# Patient Record
Sex: MALE | Race: WHITE | NOT HISPANIC OR LATINO | Employment: UNEMPLOYED | URBAN - METROPOLITAN AREA
[De-identification: names, ages, dates, MRNs, and addresses within clinical notes are randomized per-mention and may not be internally consistent; named-entity substitution may affect disease eponyms.]

---

## 2017-04-07 ENCOUNTER — HOSPITAL ENCOUNTER (INPATIENT)
Facility: CLINIC | Age: 42
LOS: 7 days | Discharge: HOME | DRG: 897 | End: 2017-04-14
Attending: FAMILY MEDICINE | Admitting: PSYCHIATRY & NEUROLOGY
Payer: COMMERCIAL

## 2017-04-07 DIAGNOSIS — F10.20 UNCOMPLICATED ALCOHOL DEPENDENCE (H): ICD-10-CM

## 2017-04-07 DIAGNOSIS — F19.10 POLYSUBSTANCE ABUSE (H): ICD-10-CM

## 2017-04-07 PROBLEM — F19.20 CHEMICAL DEPENDENCY (H): Status: ACTIVE | Noted: 2017-04-07

## 2017-04-07 LAB
ALCOHOL BREATH TEST: 0 (ref 0–0.01)
AMPHETAMINES UR QL SCN: ABNORMAL
BARBITURATES UR QL: ABNORMAL
BENZODIAZ UR QL: ABNORMAL
CANNABINOIDS UR QL SCN: ABNORMAL
COCAINE UR QL: ABNORMAL
ETHANOL UR QL SCN: ABNORMAL
OPIATES UR QL SCN: ABNORMAL

## 2017-04-07 PROCEDURE — 25000132 ZZH RX MED GY IP 250 OP 250 PS 637: Performed by: PSYCHIATRY & NEUROLOGY

## 2017-04-07 PROCEDURE — 99285 EMERGENCY DEPT VISIT HI MDM: CPT | Mod: 25 | Performed by: FAMILY MEDICINE

## 2017-04-07 PROCEDURE — 82075 ASSAY OF BREATH ETHANOL: CPT | Performed by: FAMILY MEDICINE

## 2017-04-07 PROCEDURE — 99285 EMERGENCY DEPT VISIT HI MDM: CPT | Mod: Z6 | Performed by: FAMILY MEDICINE

## 2017-04-07 PROCEDURE — HZ2ZZZZ DETOXIFICATION SERVICES FOR SUBSTANCE ABUSE TREATMENT: ICD-10-PCS | Performed by: PSYCHIATRY & NEUROLOGY

## 2017-04-07 PROCEDURE — 80307 DRUG TEST PRSMV CHEM ANLYZR: CPT | Performed by: FAMILY MEDICINE

## 2017-04-07 PROCEDURE — 12800008 ZZH R&B CD ADULT

## 2017-04-07 PROCEDURE — 25000132 ZZH RX MED GY IP 250 OP 250 PS 637: Performed by: FAMILY MEDICINE

## 2017-04-07 PROCEDURE — 80320 DRUG SCREEN QUANTALCOHOLS: CPT | Performed by: FAMILY MEDICINE

## 2017-04-07 PROCEDURE — 25000125 ZZHC RX 250: Performed by: FAMILY MEDICINE

## 2017-04-07 RX ORDER — MULTIPLE VITAMINS W/ MINERALS TAB 9MG-400MCG
1 TAB ORAL DAILY
Status: DISCONTINUED | OUTPATIENT
Start: 2017-04-07 | End: 2017-04-14 | Stop reason: HOSPADM

## 2017-04-07 RX ORDER — NICOTINE 21 MG/24HR
1 PATCH, TRANSDERMAL 24 HOURS TRANSDERMAL ONCE
Status: COMPLETED | OUTPATIENT
Start: 2017-04-07 | End: 2017-04-07

## 2017-04-07 RX ORDER — HYDROXYZINE HYDROCHLORIDE 25 MG/1
25-50 TABLET, FILM COATED ORAL EVERY 4 HOURS PRN
Status: DISCONTINUED | OUTPATIENT
Start: 2017-04-07 | End: 2017-04-14 | Stop reason: HOSPADM

## 2017-04-07 RX ORDER — DIAZEPAM 5 MG
5-20 TABLET ORAL EVERY 30 MIN PRN
Status: DISCONTINUED | OUTPATIENT
Start: 2017-04-07 | End: 2017-04-13

## 2017-04-07 RX ORDER — ACETAMINOPHEN 325 MG/1
650 TABLET ORAL EVERY 4 HOURS PRN
Status: DISCONTINUED | OUTPATIENT
Start: 2017-04-07 | End: 2017-04-14 | Stop reason: HOSPADM

## 2017-04-07 RX ORDER — DIVALPROEX SODIUM 500 MG/1
1500 TABLET, EXTENDED RELEASE ORAL DAILY
Status: ON HOLD | COMMUNITY
End: 2017-04-14

## 2017-04-07 RX ORDER — ATENOLOL 50 MG/1
50 TABLET ORAL DAILY PRN
Status: DISCONTINUED | OUTPATIENT
Start: 2017-04-07 | End: 2017-04-14 | Stop reason: HOSPADM

## 2017-04-07 RX ORDER — ALUMINA, MAGNESIA, AND SIMETHICONE 2400; 2400; 240 MG/30ML; MG/30ML; MG/30ML
30 SUSPENSION ORAL EVERY 4 HOURS PRN
Status: DISCONTINUED | OUTPATIENT
Start: 2017-04-07 | End: 2017-04-14 | Stop reason: HOSPADM

## 2017-04-07 RX ORDER — TRAZODONE HYDROCHLORIDE 50 MG/1
50 TABLET, FILM COATED ORAL
Status: DISCONTINUED | OUTPATIENT
Start: 2017-04-07 | End: 2017-04-10

## 2017-04-07 RX ORDER — BISACODYL 10 MG
10 SUPPOSITORY, RECTAL RECTAL DAILY PRN
Status: DISCONTINUED | OUTPATIENT
Start: 2017-04-07 | End: 2017-04-14 | Stop reason: HOSPADM

## 2017-04-07 RX ORDER — FOLIC ACID 1 MG/1
1 TABLET ORAL DAILY
Status: DISCONTINUED | OUTPATIENT
Start: 2017-04-07 | End: 2017-04-14 | Stop reason: HOSPADM

## 2017-04-07 RX ORDER — DIVALPROEX SODIUM 500 MG/1
500 TABLET, DELAYED RELEASE ORAL 2 TIMES DAILY
Status: DISCONTINUED | OUTPATIENT
Start: 2017-04-07 | End: 2017-04-11

## 2017-04-07 RX ORDER — MIRTAZAPINE 15 MG/1
15 TABLET, FILM COATED ORAL AT BEDTIME
Status: DISCONTINUED | OUTPATIENT
Start: 2017-04-07 | End: 2017-04-14 | Stop reason: HOSPADM

## 2017-04-07 RX ORDER — LANOLIN ALCOHOL/MO/W.PET/CERES
100 CREAM (GRAM) TOPICAL DAILY
Status: COMPLETED | OUTPATIENT
Start: 2017-04-07 | End: 2017-04-09

## 2017-04-07 RX ORDER — OLANZAPINE 5 MG/1
10 TABLET, ORALLY DISINTEGRATING ORAL ONCE
Status: COMPLETED | OUTPATIENT
Start: 2017-04-07 | End: 2017-04-07

## 2017-04-07 RX ORDER — DIAZEPAM 5 MG
5 TABLET ORAL ONCE
Status: COMPLETED | OUTPATIENT
Start: 2017-04-07 | End: 2017-04-07

## 2017-04-07 RX ORDER — ONDANSETRON 8 MG/1
8 TABLET, ORALLY DISINTEGRATING ORAL ONCE
Status: COMPLETED | OUTPATIENT
Start: 2017-04-07 | End: 2017-04-07

## 2017-04-07 RX ORDER — QUETIAPINE FUMARATE 25 MG/1
25 TABLET, FILM COATED ORAL 4 TIMES DAILY PRN
Status: DISCONTINUED | OUTPATIENT
Start: 2017-04-07 | End: 2017-04-10

## 2017-04-07 RX ORDER — GABAPENTIN 300 MG/1
300 CAPSULE ORAL 3 TIMES DAILY
Status: DISCONTINUED | OUTPATIENT
Start: 2017-04-07 | End: 2017-04-14 | Stop reason: HOSPADM

## 2017-04-07 RX ADMIN — MIRTAZAPINE 15 MG: 15 TABLET, FILM COATED ORAL at 21:49

## 2017-04-07 RX ADMIN — DIAZEPAM 10 MG: 5 TABLET ORAL at 17:29

## 2017-04-07 RX ADMIN — GABAPENTIN 300 MG: 300 CAPSULE ORAL at 21:10

## 2017-04-07 RX ADMIN — DIAZEPAM 10 MG: 5 TABLET ORAL at 21:10

## 2017-04-07 RX ADMIN — OLANZAPINE 10 MG: 5 TABLET, ORALLY DISINTEGRATING ORAL at 13:16

## 2017-04-07 RX ADMIN — NICOTINE POLACRILEX 8 MG: 4 LOZENGE ORAL at 21:11

## 2017-04-07 RX ADMIN — MULTIPLE VITAMINS W/ MINERALS TAB 1 TABLET: TAB at 17:29

## 2017-04-07 RX ADMIN — FOLIC ACID 1 MG: 1 TABLET ORAL at 17:29

## 2017-04-07 RX ADMIN — ONDANSETRON 8 MG: 8 TABLET, ORALLY DISINTEGRATING ORAL at 12:34

## 2017-04-07 RX ADMIN — NICOTINE POLACRILEX 8 MG: 4 LOZENGE ORAL at 17:29

## 2017-04-07 RX ADMIN — Medication 100 MG: at 17:29

## 2017-04-07 RX ADMIN — DIAZEPAM 5 MG: 5 TABLET ORAL at 12:27

## 2017-04-07 RX ADMIN — DIVALPROEX SODIUM 500 MG: 500 TABLET, DELAYED RELEASE ORAL at 21:10

## 2017-04-07 RX ADMIN — NICOTINE 1 PATCH: 21 PATCH, EXTENDED RELEASE TRANSDERMAL at 13:50

## 2017-04-07 ASSESSMENT — ACTIVITIES OF DAILY LIVING (ADL)
LAUNDRY: WITH SUPERVISION
DRESS: STREET CLOTHES;SCRUBS (BEHAVIORAL HEALTH);INDEPENDENT
GROOMING: INDEPENDENT
ORAL_HYGIENE: INDEPENDENT

## 2017-04-07 NOTE — IP AVS SNAPSHOT
MRN:[id number]                      After Visit Summary   4/7/2017    Kai Sims    MRN: [id number]           Thank you!     Thank you for choosing [facility] for your care. Our goal is always to provide you with excellent care.        Patient Information     Date Of Birth          1975        Designated Caregiver       Most Recent Value    Caregiver    Will someone help with your care after discharge? no      About your hospital stay     You were admitted on:  April 7, 2017 You last received care in the:  [facility]    You were discharged on:  April 14, 2017       Who to Call     For medical emergencies, please call 911.  For non-urgent questions about your medical care, please call your primary care provider or clinic, None          Attending Provider     Provider Specialty    Alan Cardoza MD Family Practice    [facility], Nicholas Wells MD Emergency Medicine    Damir Patel MD Psychiatry       Primary Care Provider    Physician No Ref-Primary       No address on file        Follow-up Appointments     Follow Up ([facility])       Follow up with PCP within 1 week for repeat LFTs    Appointments on [facility] and/or [facility] (with [facility] or [facility] provider or service). Call [phone] if you haven't heard regarding these appointments within 7 days of discharge.                  Your next 10 appointments already scheduled     Apr 14, 2017  1:00 PM CDT   Treatment with LP/DOP ADMISSIONS   [facility] ([facility], [facility])    [address] [place] [address] [id number] [phone]              Further instructions from your care team       Behavioral Discharge Planning and Instructions  THANK YOU FOR CHOOSING THE [facility]  3A  [phone]    Summary: You were admitted to [facility] on 4/7/2017 for Alcohol dependence   You are being discharged to [facility]  Please take  care and make your recovery a priority, Kai!        Main Diagnosis:  Alcohol use disorder - severe    Major Treatments, Procedures and Findings:  You received medical treatment for the symptoms of alcohol withdrawal. A medical exam was performed that included lab work. You have met with a .       Symptoms to Report:  If you experience more anxiety, confusion, sleeplessness, deep sadness or thoughts of suicide, notify your treatment team or notify your primary care physician. IF ANY OF THE SYMPTOMS YOU ARE EXPERIENCING ARE A MEDICAL EMERGENCY CALL 911 IMMEDIATELY.     Lifestyle Adjustment: Adjust your lifestyle to get enough sleep, relaxation, exercise and  good nutrition. Continue to develop healthy coping skills to decrease stress and promote a sober living environment. Do not use alcohol, illegal drugs or addictive medications other than what is currently prescribed. AA, NA, and  Sponsor are good resources for support.     Primary Provider:  1. Alcohol use disorder  2. Stimulant use disorder  3. Unspecified bipolar disorder    Psychiatry Follow-up:       Resources:     [facility] [phone]    Support Group:  AA/NA and Sponsor/support    Crisis Intervention: 795.977.3497 or 788-933-5675 (TTY: 462.192.9729).  Call anytime for help.  National [place] on Mental Illness (www.[place].br[place]any.org): 241.814.7236 or 670-244-2508.  Alcoholics Anonymous (www.alcoholics-anonymous.org): Check your phone book for your local chapter.  Suicide Awareness Voices of Education (SAVE) (www.save.org): 937-741-ORFQ (8483)  National Suicide Prevention Line (www.mentalhealthmn.org): 842-446-HTMV (9148)  Mental Health Consumer/Survivor Network of [place] (www.mhcsn.net): 114.681.9532 or 003-342-0414  Mental Health Association of [place] (www.mentalhealth.org): 975.175.4524 or 260-948-7672   Substance Abuse and Mental Health Services (www.samhsa.gov)    [place] Recovery Connection (MRC)  [facility] connects people seeking recovery  "to resources that help foster and sustain long-term recovery.  Whether you are seeking resources for treatment, transportation, housing, job training, education, health care or other pathways to recovery, [facility] is a great place to start.  422.583.2793.  Www.[facility].org    General Medication Instructions:   See your medication sheet(s) for instructions.   Take all medicines as directed.  Make no changes unless your doctor suggests them.   Go to all your doctor visits.  Be sure to have all your required lab tests. This way, your medicines can be refilled on time.  Do not use any drugs not prescribed by your provider.  AA/NA and Sponsors are excellent resources for support  Avoid alcohol.    Please Note:  Please take this discharge folder with you to all your follow up appointments, it contains your lab results, diagnosis, medication list and discharge recommendations.      Pending Results     No orders found from 4/5/2017 to 4/8/2017.            Statement of Approval     Ordered          04/14/17 1048  I have reviewed and agree with all the recommendations and orders detailed in this document.  EFFECTIVE NOW     Approved and electronically signed by:  Damir Patel MD             Admission Information     Date & Time Provider Department Dept. Phone    4/7/2017 Damir Patel MD [facility] [phone]      Your Vitals Were     Blood Pressure Pulse Temperature Respirations Height Weight    143/76 87 97.3  F (36.3  C) (Oral) 16 1.702 m (5' 7\") 60.8 kg (134 lb)    Pulse Oximetry BMI (Body Mass Index)                98% 20.99 kg/m2          MyChart Information     [employer] lets you send messages to your doctor, view your test results, renew your prescriptions, schedule appointments and more. To sign up, go to [email or web address] . Click on \"Log in\" on the left side of the screen, which will take you to the Welcome page. Then click on \"Sign up Now\" on the right side of the " page.     You will be asked to enter the access code listed below, as well as some personal information. Please follow the directions to create your username and password.     Your access code is: [email or web address]  Expires: 2017 12:24 PM     Your access code will  in 90 days. If you need help or a new code, please call your [facility] clinic or [phone].        Care EveryWhere ID     This is your Care EveryWhere ID. This could be used by other organizations to access your [facility] medical records  [id number]           Review of your medicines      START taking        Dose / Directions    divalproex 500 MG EC tablet   Commonly known as:  DEPAKOTE   Used for:  Uncomplicated alcohol dependence (H)   Replaces:  divalproex 500 MG 24 hr tablet        Dose:  1500 mg   Take 3 tablets (1,500 mg) by mouth At Bedtime   Quantity:  90 tablet   Refills:  0       folic acid 1 MG tablet   Commonly known as:  FOLVITE   Used for:  Uncomplicated alcohol dependence (H)        Dose:  1 mg   Take 1 tablet (1 mg) by mouth daily   Quantity:  30 tablet   Refills:  0       lurasidone 20 MG Tabs tablet   Commonly known as:  LATUDA   Used for:  Uncomplicated alcohol dependence (H)        Dose:  20 mg   Take 1 tablet (20 mg) by mouth 2 times daily   Quantity:  60 tablet   Refills:  0       multivitamin, therapeutic with minerals Tabs tablet   Used for:  Uncomplicated alcohol dependence (H)        Dose:  1 tablet   Take 1 tablet by mouth daily   Quantity:  30 each   Refills:  0       nicotine polacrilex 4 MG lozenge   Used for:  Uncomplicated alcohol dependence (H)        Dose:  4-8 mg   Place 1-2 lozenges (4-8 mg) inside cheek every hour as needed for other (nicotine withdrawal symptoms)   Quantity:  360 tablet   Refills:  0       thiamine 100 MG tablet   Used for:  Uncomplicated alcohol dependence (H)        Dose:  100 mg   Take 1 tablet (100 mg) by mouth daily   Quantity:  30 tablet   Refills:  0         CONTINUE these medicines  which may have CHANGED, or have new prescriptions. If we are uncertain of the size of tablets/capsules you have at home, strength may be listed as something that might have changed.        Dose / Directions    gabapentin 300 MG capsule   Commonly known as:  NEURONTIN   This may have changed:  medication strength   Used for:  Uncomplicated alcohol dependence (H)        Dose:  300 mg   Take 1 capsule (300 mg) by mouth 3 times daily   Quantity:  90 capsule   Refills:  0       QUEtiapine 25 MG tablet   Commonly known as:  SEROquel   This may have changed:    - how much to take  - when to take this  - reasons to take this   Used for:  Uncomplicated alcohol dependence (H)        Dose:  25-50 mg   Take 1-2 tablets (25-50 mg) by mouth 4 times daily as needed (Anxiety, Agitation)   Quantity:  60 tablet   Refills:  1         CONTINUE these medicines which have NOT CHANGED        Dose / Directions    mirtazapine 15 MG tablet   Commonly known as:  REMERON   Used for:  Uncomplicated alcohol dependence (H)        Dose:  15 mg   Take 1 tablet (15 mg) by mouth At Bedtime   Quantity:  30 tablet   Refills:  0         STOP taking     divalproex 500 MG 24 hr tablet   Commonly known as:  DEPAKOTE ER   Replaced by:  divalproex 500 MG EC tablet                Where to get your medicines      These medications were sent to [facility] [place], [place] - [address]  [address] [address]     Phone:  [phone]     divalproex 500 MG EC tablet    folic acid 1 MG tablet    gabapentin 300 MG capsule    lurasidone 20 MG Tabs tablet    mirtazapine 15 MG tablet    multivitamin, therapeutic with minerals Tabs tablet    nicotine polacrilex 4 MG lozenge    QUEtiapine 25 MG tablet    thiamine 100 MG tablet                Protect others around you: Learn how to safely use, store and throw away your medicines at www.disposemymeds.org.             Medication List: This is a list of all your medications and  when to take them. Check marks below indicate your daily home schedule. Keep this list as a reference.      Medications           Morning Afternoon Evening Bedtime As Needed    divalproex 500 MG EC tablet   Commonly known as:  DEPAKOTE   Take 3 tablets (1,500 mg) by mouth At Bedtime   Last time this was given:  500 mg on 4/13/2017  6:05 AM                                   folic acid 1 MG tablet   Commonly known as:  FOLVITE   Take 1 tablet (1 mg) by mouth daily   Last time this was given:  1 mg on 4/14/2017  8:00 AM                                   gabapentin 300 MG capsule   Commonly known as:  NEURONTIN   Take 1 capsule (300 mg) by mouth 3 times daily   Last time this was given:  300 mg on 4/14/2017  8:00 AM                                         lurasidone 20 MG Tabs tablet   Commonly known as:  LATUDA   Take 1 tablet (20 mg) by mouth 2 times daily   Last time this was given:  20 mg on 4/14/2017  8:00 AM                                      mirtazapine 15 MG tablet   Commonly known as:  REMERON   Take 1 tablet (15 mg) by mouth At Bedtime   Last time this was given:  15 mg on 4/13/2017  9:44 PM                                   multivitamin, therapeutic with minerals Tabs tablet   Take 1 tablet by mouth daily   Last time this was given:  1 tablet on 4/14/2017  8:00 AM                                   nicotine polacrilex 4 MG lozenge   Place 1-2 lozenges (4-8 mg) inside cheek every hour as needed for other (nicotine withdrawal symptoms)   Last time this was given:  8 mg on 4/14/2017 12:22 PM                                   QUEtiapine 25 MG tablet   Commonly known as:  SEROquel   Take 1-2 tablets (25-50 mg) by mouth 4 times daily as needed (Anxiety, Agitation)   Last time this was given:  50 mg on 4/14/2017 10:14 AM                                   thiamine 100 MG tablet   Take 1 tablet (100 mg) by mouth daily   Last time this was given:  100 mg on 4/14/2017  8:00 AM

## 2017-04-07 NOTE — PHARMACY-ADMISSION MEDICATION HISTORY
Admission medication history interview status for the 4/7/2017 admission is complete. See Epic admission navigator for allergy information, pharmacy, prior to admission medications and immunization status.     Medication history interview sources:  patient, [facility] [address]    Changes made to PTA medication list (reason)  Added: Remeron, gabapentin  Deleted: docusate, hydrocortisone rectal cream, zyprexa (per patient has not had in a while, rx on file from 2012)  Changed: Depakote to ER formulation    Additional medication history information (including reliability of information, actions taken by pharmacist):  -Patient does not seem to be the best historian, he remembers drug names if you say them to him. He says he has not been taking any medications much the past 2 years. He last took his medications in February 2017 approximately. He says he took all four of the medications listed below, but he has not gotten the Depakote and Seroquel filled in the past 6 months, so unsure about the reliability of this.   -[facility] had the gabapentin and mirtazapine as his most recently filled medication (last filled 2/7/17). No recent record of filling the Depakote or Seroquel.    Prior to Admission medications    Medication Sig Last Dose Taking? Auth Provider   divalproex (DEPAKOTE ER) 500 MG 24 hr tablet Take 1,500 mg by mouth daily More than a month at Unknown time  Unknown, Entered By History   Mirtazapine (REMERON PO) Take 15 mg by mouth At Bedtime More than a month at Unknown time  Unknown, Entered By History   GABAPENTIN PO Take 300 mg by mouth 3 times daily More than a month at Unknown time  Unknown, Entered By History   QUEtiapine Fumarate (SEROQUEL PO) Take 25 mg by mouth as needed. More than a month at Unknown time  Reported, Patient     Medication history completed by:   Nilsa Mojica, Pharm.D.

## 2017-04-07 NOTE — PLAN OF CARE
Problem: General Plan of Care (Inpatient Behavioral)  Goal: Individualization/Patient Specific Goal (IP Behavioral)  The patient and/or their representative will achieve their patient-specific goals related to the plan of care.    The patient-specific goals include:   Patient will identify reason(s) for hospitalization from their perspective.  Patient will identify a goal for discharge.  Patient will identify triggers that may increase their risk for relapse.  Patient will identify coping skills they can do to stay well.   Patient will identify their support system to demonstrate readiness for discharge.  Illnesses Management & Recovery  Patient identified  as trigger for relapse.  Patient identified  as a general wellness strategy.  Patient identified  as a warning sign that they are in danger of relapse.  Patient identified  as someone they count on to get feedback from.  Patient identified  as a way to take action when in danger of relapse.  Patient identified  as a way to cope with stress or other symptoms.

## 2017-04-07 NOTE — PLAN OF CARE
Problem: Substance Withdrawal  Goal: Substance Withdrawal  Problem: General Plan of Care (Inpatient Behavioral)  Goal: Individualization/ Patient Specific Goal (IP Behavioral)  The patient and/or their representative their patient-specific goals related to the plan of care.  Patient specific goals include:  1. Patient will be evaluated by a physician and labs will be evaluated.  2. Patient will be seen by a  for evaluation and discharge planning.  3. Patient will complete assessment paperwork  4. Patient will consume 75 % of meal to meet estimated energy needs.  5. Detoxification from alcohol using valium.  6. Patient will be provided with alcohol relapse prevention information.Signs and symptoms of listed problems will be absent or manageable.   Outcome: Declining  S::  Kai is a 40 yo M who comes to  seeking detox from alcohol.  He reports that he has been drinking one liter of vodka daily for the last two years.  He states that up until 20 days ago he was crystal meth daily for 22 yrs.  He was a daily pot smoker as well up until 15 days ago (since adolescence), but his ultimate goal is to get in to [facility] long term treatment, so he has stopped using drugs to have a clean Utox.  He reports that his last drink was last night at 22:30.  He is also a one ppd cigarette smoker, since adolescence also.  He states that he has been depressed in the past and that he has been diagnosed with bipolar disorder.  He has not been on any of his medications for more than a month.  He says that he is actually optimistic right now.  He denies any thoughts of self harm or thoughts of harming others.  He did say that he has contemplated suicide in the past. He has lost contact with all of his family members, even his 21 yo son, Juan Luis.    He has a best friend whom he sees a couple of times a week.  Her name is Marcell.  She is his emergency contact and her phone number is [phone].    B:  Epic lists a  "history of bipolar disorder and substance abuse.  He states that hd has been to detox ~ 35 times and states he has been to treatment \"40 something\" times.  He denies any other medical history.  He has lost some digits due to frostbite..  A:  Patient in moderate alcohol withdrawal AEB MSSA scores of 14 &   R:  Obtained admission orders.  Provided with a nutritious meal and encouraged increased fluid intake.  Oriented to unit routines and schedule, including lab work. Counseled on smoking cessation.  Introduced to IM&R Treatment program.  Administered a MVI, thiamine 100 mg, folic acid 1 mg, diazepam 10 mg.  Continue to monitor and medicate as indicated and ordered.      S:  4/8/17  Pt confessed that he last used crystal meth last week about three days ago.  "

## 2017-04-07 NOTE — IP AVS SNAPSHOT
[facility]    [address] [place] [id number]    Phone:  [phone]                                       After Visit Summary   4/7/2017    Kai Sims    MRN: [id number]           After Visit Summary Signature Page     I have received my discharge instructions, and my questions have been answered. I have discussed any challenges I see with this plan with the nurse or doctor.    ..........................................................................................................................................  Patient/Patient Representative Signature      ..........................................................................................................................................  Patient Representative Print Name and Relationship to Patient    ..................................................               ................................................  Date                                            Time    ..........................................................................................................................................  Reviewed by Signature/Title    ...................................................              ..............................................  Date                                                            Time

## 2017-04-07 NOTE — PROGRESS NOTES
04/07/17 1542   Patient Belongings   Did you bring any home meds/supplements to the hospital?  No   Patient Belongings other (see comments)   Disposition of Belongings storage bin   Belongings Search Yes   Clothing Search Yes   Second Staff gt velasquez   storage bin: hat, cigarettes  NO CELL PHONE, NO WALLET  ADMISSION:  I am responsible for any personal items that are not sent to the safe or pharmacy. [facility] is not responsible for loss, theft or damage of any property in my possession.    Patient Signature _____________________ Date/Time _____________________    Staff Signature _______________________ Date/Time _____________________    [facility] Staff person, if patient is unable/unwilling to sign  ___________________________________ Date/Time _____________________  DISCHARGE:  All personal items have been returned to me.    Patient Signature _____________________ Date/Time _____________________    Staff Signature _______________________ Date/Time _____________________

## 2017-04-07 NOTE — ED PROVIDER NOTES
History     Chief Complaint   Patient presents with     Alcohol Problem     seeking detox from ETOH. Last drink was last night. drinks 1L vodka daily     HPI  Kai Sims is a 41 year old male who presents seeking detox from alcohol.  Patient has a history of alcohol dependence, bipolar disorder, polysubstance abuse.  Patient has been drinking vodka up to 1 L per day.  His last drink was yesterday.  He states in the past he has experienced significant alcohol withdrawal including alcohol withdrawal seizure 2 or 3 years ago.  Today he feels anxious, restless and has a slight tremor.  He has a plan to enter [facility] chemical dependency treatment, but feels he would like to go through detox 1st.  In the past has used marijuana and methamphetamine but estimates at least 2 weeks since his last use.  He has cravings to use alcohol, and fears he cannot stay sober until entering the treatment program, would like to go to detox.  He does have a history of bipolar disorder, is off of his medications.  He denies any acute mental health symptoms, and denies any acute medical problems.    I have reviewed the Medications, Allergies, Past Medical and Surgical History, and Social History in the Epic system.    Review of Systems  All other systems reviewed and were negative    Physical Exam   BP: (!) 150/91  Pulse: 112  Temp: 98.7  F (37.1  C)  Resp: 18  Weight: 61.1 kg (134 lb 11.2 oz)  SpO2: 97 %  Physical Exam   Constitutional: He is oriented to person, place, and time. He appears well-developed and well-nourished.   HENT:   Head: Normocephalic and atraumatic.   Mouth/Throat: Oropharynx is clear and moist.   Eyes: EOM are normal. Pupils are equal, round, and reactive to light.   Neck: Normal range of motion. Neck supple. No tracheal deviation present. No thyromegaly present.   Cardiovascular: Normal rate, regular rhythm, normal heart sounds and intact distal pulses.  Exam reveals no gallop and no friction rub.    No  murmur heard.  Pulmonary/Chest: Effort normal and breath sounds normal. He exhibits no tenderness.   Abdominal: Soft. Bowel sounds are normal. He exhibits no distension and no mass. There is no tenderness.   Musculoskeletal: He exhibits no edema or tenderness.   Neurological: He is alert and oriented to person, place, and time. He has normal strength. He displays tremor. No cranial nerve deficit or sensory deficit. Coordination and gait normal.   Skin: Skin is warm and dry. No rash noted.   Psychiatric: His speech is normal and behavior is normal. Judgment and thought content normal. His mood appears anxious. Cognition and memory are normal.   Nursing note and vitals reviewed.      ED Course     ED Course     Procedures             Critical Care time:  none               Labs Ordered and Resulted from Time of ED Arrival Up to the Time of Departure from the ED   ALCOHOL BREATH TEST POCT - Normal   DRUG ABUSE SCREEN 6 CHEM DEP URINE ([facility])       Assessments & Plan (with Medical Decision Making)   Patient with a history of alcoholism and polysubstance abuse.  He is planning on entering chemical dependency treatment at [facility].  He also has bipolar disorder and is off medications.  His last drink was last night, and he has signs of mild alcohol withdrawal, as well as cravings to use alcohol.  He would like to enter and complete our detox program prior to chemical dependency treatment.  He has no active mental health symptoms and no other acute medical problems.  He is a good candidate for voluntary admission.    I have reviewed the nursing notes.    I have reviewed the findings, diagnosis, plan and need for follow up with the patient.    New Prescriptions    No medications on file       Final diagnoses:   Uncomplicated alcohol dependence (H)   Polysubstance abuse       4/7/2017   [facility], [place], EMERGENCY DEPARTMENT     Alan Cardoza MD  04/07/17 8943

## 2017-04-08 LAB
ALBUMIN SERPL-MCNC: 3.3 G/DL (ref 3.4–5)
ALP SERPL-CCNC: 77 U/L (ref 40–150)
ALT SERPL W P-5'-P-CCNC: 90 U/L (ref 0–70)
ANION GAP SERPL CALCULATED.3IONS-SCNC: 5 MMOL/L (ref 3–14)
AST SERPL W P-5'-P-CCNC: 87 U/L (ref 0–45)
BILIRUB SERPL-MCNC: 0.6 MG/DL (ref 0.2–1.3)
BUN SERPL-MCNC: 18 MG/DL (ref 7–30)
CALCIUM SERPL-MCNC: 8.6 MG/DL (ref 8.5–10.1)
CHLORIDE SERPL-SCNC: 107 MMOL/L (ref 94–109)
CHOLEST SERPL-MCNC: 214 MG/DL
CO2 SERPL-SCNC: 30 MMOL/L (ref 20–32)
CREAT SERPL-MCNC: 0.65 MG/DL (ref 0.66–1.25)
ERYTHROCYTE [DISTWIDTH] IN BLOOD BY AUTOMATED COUNT: 14 % (ref 10–15)
GFR SERPL CREATININE-BSD FRML MDRD: ABNORMAL ML/MIN/1.7M2
GGT SERPL-CCNC: 96 U/L (ref 0–75)
GLUCOSE SERPL-MCNC: 86 MG/DL (ref 70–99)
HCT VFR BLD AUTO: 44.5 % (ref 40–53)
HDLC SERPL-MCNC: 119 MG/DL
HGB BLD-MCNC: 15.2 G/DL (ref 13.3–17.7)
LDLC SERPL CALC-MCNC: 74 MG/DL
MCH RBC QN AUTO: 30.9 PG (ref 26.5–33)
MCHC RBC AUTO-ENTMCNC: 34.2 G/DL (ref 31.5–36.5)
MCV RBC AUTO: 90 FL (ref 78–100)
NONHDLC SERPL-MCNC: 95 MG/DL
PLATELET # BLD AUTO: 245 10E9/L (ref 150–450)
POTASSIUM SERPL-SCNC: 4.4 MMOL/L (ref 3.4–5.3)
PROT SERPL-MCNC: 6.6 G/DL (ref 6.8–8.8)
RBC # BLD AUTO: 4.92 10E12/L (ref 4.4–5.9)
SODIUM SERPL-SCNC: 142 MMOL/L (ref 133–144)
TRIGL SERPL-MCNC: 103 MG/DL
WBC # BLD AUTO: 5.4 10E9/L (ref 4–11)

## 2017-04-08 PROCEDURE — 82977 ASSAY OF GGT: CPT | Performed by: PSYCHIATRY & NEUROLOGY

## 2017-04-08 PROCEDURE — 25000132 ZZH RX MED GY IP 250 OP 250 PS 637: Performed by: PSYCHIATRY & NEUROLOGY

## 2017-04-08 PROCEDURE — 80061 LIPID PANEL: CPT | Performed by: PSYCHIATRY & NEUROLOGY

## 2017-04-08 PROCEDURE — 99207 ZZC CDG-HISTORY COMP: MEETS EXP. PROBLEM FOCUSED - DOWN CODED LACK OF HPI: CPT | Performed by: PHYSICIAN ASSISTANT

## 2017-04-08 PROCEDURE — 12800008 ZZH R&B CD ADULT

## 2017-04-08 PROCEDURE — 85027 COMPLETE CBC AUTOMATED: CPT | Performed by: PSYCHIATRY & NEUROLOGY

## 2017-04-08 PROCEDURE — 80053 COMPREHEN METABOLIC PANEL: CPT | Performed by: PSYCHIATRY & NEUROLOGY

## 2017-04-08 PROCEDURE — 99231 SBSQ HOSP IP/OBS SF/LOW 25: CPT | Performed by: PHYSICIAN ASSISTANT

## 2017-04-08 PROCEDURE — 99221 1ST HOSP IP/OBS SF/LOW 40: CPT | Mod: AI | Performed by: PSYCHIATRY & NEUROLOGY

## 2017-04-08 PROCEDURE — 99207 ZZC DOWN CODE DUE TO INITIAL EXAM: CPT | Performed by: PSYCHIATRY & NEUROLOGY

## 2017-04-08 PROCEDURE — 36415 COLL VENOUS BLD VENIPUNCTURE: CPT | Performed by: PSYCHIATRY & NEUROLOGY

## 2017-04-08 RX ORDER — OLANZAPINE 5 MG/1
5-10 TABLET, ORALLY DISINTEGRATING ORAL EVERY 6 HOURS PRN
Status: DISCONTINUED | OUTPATIENT
Start: 2017-04-08 | End: 2017-04-10

## 2017-04-08 RX ORDER — OLANZAPINE 10 MG/2ML
5-10 INJECTION, POWDER, FOR SOLUTION INTRAMUSCULAR EVERY 6 HOURS PRN
Status: DISCONTINUED | OUTPATIENT
Start: 2017-04-08 | End: 2017-04-10

## 2017-04-08 RX ADMIN — FOLIC ACID 1 MG: 1 TABLET ORAL at 08:23

## 2017-04-08 RX ADMIN — DIAZEPAM 10 MG: 5 TABLET ORAL at 12:19

## 2017-04-08 RX ADMIN — DIAZEPAM 10 MG: 5 TABLET ORAL at 04:03

## 2017-04-08 RX ADMIN — Medication 100 MG: at 08:23

## 2017-04-08 RX ADMIN — DIVALPROEX SODIUM 500 MG: 500 TABLET, DELAYED RELEASE ORAL at 08:23

## 2017-04-08 RX ADMIN — NICOTINE POLACRILEX 8 MG: 4 LOZENGE ORAL at 16:33

## 2017-04-08 RX ADMIN — NICOTINE POLACRILEX 8 MG: 4 LOZENGE ORAL at 20:25

## 2017-04-08 RX ADMIN — NICOTINE POLACRILEX 8 MG: 4 LOZENGE ORAL at 10:21

## 2017-04-08 RX ADMIN — QUETIAPINE 25 MG: 25 TABLET, FILM COATED ORAL at 10:43

## 2017-04-08 RX ADMIN — GABAPENTIN 300 MG: 300 CAPSULE ORAL at 08:23

## 2017-04-08 RX ADMIN — DIAZEPAM 10 MG: 5 TABLET ORAL at 14:26

## 2017-04-08 RX ADMIN — DIAZEPAM 10 MG: 5 TABLET ORAL at 16:33

## 2017-04-08 RX ADMIN — MIRTAZAPINE 15 MG: 15 TABLET, FILM COATED ORAL at 21:36

## 2017-04-08 RX ADMIN — DIVALPROEX SODIUM 500 MG: 500 TABLET, DELAYED RELEASE ORAL at 20:25

## 2017-04-08 RX ADMIN — NICOTINE POLACRILEX 8 MG: 4 LOZENGE ORAL at 17:57

## 2017-04-08 RX ADMIN — DIAZEPAM 10 MG: 5 TABLET ORAL at 20:25

## 2017-04-08 RX ADMIN — DIAZEPAM 10 MG: 5 TABLET ORAL at 08:23

## 2017-04-08 RX ADMIN — QUETIAPINE 25 MG: 25 TABLET, FILM COATED ORAL at 14:26

## 2017-04-08 RX ADMIN — QUETIAPINE 25 MG: 25 TABLET, FILM COATED ORAL at 20:25

## 2017-04-08 RX ADMIN — DIAZEPAM 10 MG: 5 TABLET ORAL at 00:12

## 2017-04-08 RX ADMIN — GABAPENTIN 300 MG: 300 CAPSULE ORAL at 20:25

## 2017-04-08 RX ADMIN — GABAPENTIN 300 MG: 300 CAPSULE ORAL at 14:26

## 2017-04-08 RX ADMIN — NICOTINE POLACRILEX 8 MG: 4 LOZENGE ORAL at 14:52

## 2017-04-08 RX ADMIN — NICOTINE POLACRILEX 4 MG: 4 LOZENGE ORAL at 21:38

## 2017-04-08 RX ADMIN — MULTIPLE VITAMINS W/ MINERALS TAB 1 TABLET: TAB at 08:23

## 2017-04-08 RX ADMIN — OLANZAPINE 10 MG: 5 TABLET, ORALLY DISINTEGRATING ORAL at 12:58

## 2017-04-08 ASSESSMENT — ACTIVITIES OF DAILY LIVING (ADL)
ORAL_HYGIENE: INDEPENDENT
LAUNDRY: WITH SUPERVISION
DRESS: STREET CLOTHES;INDEPENDENT
GROOMING: INDEPENDENT

## 2017-04-08 NOTE — CONSULTS
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    Per notes, has been drinking 1L vodka daily. Has h/o withdrawal seizures. Current withdrawal symptoms include anxiety, shakes, weakness, restlessness. Has untreated HCV and would like to get sober then receive treatment. Patient asymptomatic. Denies recent illness, fever, headache, confusion, acute visual changes, dizziness, chest pain, dyspnea, cough, abdominal pain, N/V, urinary symptoms, change in bowels, peripheral edema, new onset joint pain, or rash       Past Medical History:     Past Medical History:   Diagnosis Date     Bipolar affective disorder (H)      Substance abuse         Reviewed and updated in [employer].     Past Surgical History:      Past Surgical History:   Procedure Laterality Date     ENT SURGERY       ORTHOPEDIC SURGERY           Social History:   Tobacco use: 1 ppd  Alcohol use: 1L vodka daily   Elicit drug use: IV meth, marijuana     Reviewed and updated in Epic.     Family History:   No family history on file.      Allergies:   No Known Allergies      Medications:   Reviewed. Please see MAR     Review of Systems:   10 point ROS of systems including Constitutional, Eyes, Respiratory, Cardiovascular, Gastroenterology, Genitourinary, Integumentary, Muscularskeletal, Psychiatric were all negative except for pertinent positives noted in my HPI.      OBJECTIVE   Physical Exam:   Vitals were reviewed  Blood  "pressure 131/87, pulse 101, temperature 97.2  F (36.2  C), temperature source Tympanic, resp. rate 16, height 1.702 m (5' 7\"), weight 60.8 kg (134 lb), SpO2 98 %.  General: Alert, NAD, appears anxious  HEENT: NCAT, anicteric sclera, EOMI, mucous membranes pink and moist  Neck: Supple, no lymphadenopathy or thyromegaly  Cardiovascular: RRR, S1S2  Lungs: CTAB  Abdomen: + BS, soft without distention and no tenderness   Vascular: no peripheral edema, distal pulses palpable  Neurologic: AAO X 3, no focal deficits  Neuropsychiatric: Per psych  Skin: No jaundice, rashes, or lesions        Data:        Lab Results   Component Value Date     04/08/2017    Lab Results   Component Value Date    CHLORIDE 107 04/08/2017    Lab Results   Component Value Date    BUN 18 04/08/2017      Lab Results   Component Value Date    POTASSIUM 4.4 04/08/2017    Lab Results   Component Value Date    CO2 30 04/08/2017    Lab Results   Component Value Date    CR 0.65 04/08/2017        Lab Results   Component Value Date    WBC 5.4 04/08/2017    HGB 15.2 04/08/2017    HCT 44.5 04/08/2017    MCV 90 04/08/2017     04/08/2017     Lab Results   Component Value Date    WBC 5.4 04/08/2017         Assessment and Plan/Recommendations:   Kai Sims is a 41 year old male with a history of substance abuse, HCV, hemorrhoids, 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    Substance abuse with acute etoh withdrawal: Drinking 1L vodka daily  - Management per psych    HCV: Untreated, would like to get sober and undergo treatment. Patient asymptomatic   - Follow up with PCP to help establish GI consult once sober    Tachycardia: Intermittent and in the setting of acute withdrawal. Asympatomatic  - Please contact medicine if patient tachycardic despite completion of withdrawal    Transaminitis: Mild ALT/AST elevation at 90/87. ALP and Tbili normal. Likely 2/2 etoh, " although not it typical ratio. Untreated HCV may also be contributing.   - Follow up LFTs with PCP  - Contact medicine if patient develops fever, jaundice, icterus, RUQ pain, N/V, abdominal pain, or AMS      Currently, medically stable and I will be happy to follow up and see again for any intercurrent medical issues. Thank you for the opportunity to be a part of this patient's care.      Didi Liang  Internal Medicine [facility] Hospitalist  [phone]  April 8, 2017

## 2017-04-08 NOTE — PROGRESS NOTES
Writer met with pt who reports he is interested in [facility] for treatment. Pt is working on completing his paperwork at this time for Rule 25 assessment. Pt has [facility] PMAP. In basket sent requesting wait list.    CM will complete assessment, addendum, SBAR1, and ECERT for admit.

## 2017-04-08 NOTE — PROVIDER NOTIFICATION
Patient requested and received Seroquel 25mg (see MAR). Pt also requested Zyprexa as needed for feelings of agitation/anxiety. Dr. Lawler paged and notified. Zyprexa p.o. Or IM order has been placed at this time (see MAR).

## 2017-04-08 NOTE — PLAN OF CARE
"Problem: Substance Withdrawal  Goal: Substance Withdrawal  Problem: General Plan of Care (Inpatient Behavioral)  Goal: Individualization/ Patient Specific Goal (IP Behavioral)  The patient and/or their representative their patient-specific goals related to the plan of care.  Patient specific goals include:  1. Patient will be evaluated by a physician and labs will be evaluated.  2. Patient will be seen by a  for evaluation and discharge planning.  3. Patient will complete assessment paperwork  4. Patient will consume 75 % of meal to meet estimated energy needs.  5. Detoxification from alcohol using valium.  6. Patient will be provided with alcohol relapse prevention information.Signs and symptoms of listed problems will be absent or manageable.   Outcome: Declining     Patient up and visible in the milieu. MSSA upon first am check = 12, patient medicated with 10mg valium per unit protocol. Pt's speech is pressured and hyper-verbal. Pt is hyperactive and restless. Pt intrusive at times with other patients on the unit. Pt cooperative with re-direction. Pt denies SI/SIB/HI. Pt denies auditory/visual hallucinations. Pt stated, \"Sometimes I see weird stuff and hear weird stuff, mostly when i'm using.\" Pt offered Seroquel 25mg, patient refused. Patient attending/participating in groups.      B/P: 131/87, T: 97.2, P: 101, R: 16          "

## 2017-04-08 NOTE — H&P
DATE OF ADMISSION: 4/7/17.       DATE OF SERVICE: 4/8/2017.       CHIEF COMPLAINT: Alcohol detox      HISTORY OF PRESENT ILLNESS:Kai Sims is a 41 year old male who presents to  through the ED seeking detox from alcohol. Patient has a history of alcohol dependence, bipolar disorder, polysubstance abuse. Patient has been drinking vodka up to 1 L per day. His last drink was 2 days ago. He states in the past he has experienced significant alcohol withdrawal including alcohol withdrawal seizure 2 or 3 years ago. He has a plan to enter [facility] chemical dependency treatment, but feels he would like to go through detox 1st. In the past has used marijuana and methamphetamine but estimates at least 2 weeks since his last use. He states that he uses meth and marijuana on a regular basis. He has cravings to use alcohol, and fears he cannot stay sober until entering the treatment program, and hence requested detox. He does have a history of bipolar disorder, is off of his medications for 1 year. He denied any acute mental health symptoms. When asked to further elaborate on his alcohol and meth use, he refused to do so stating that he won't be able to answer those questions today without getting irritable since he is not in a good mood. Then he apologized and left the exam room.       PAST PSYCHIATRIC HISTORY: Details could not be obtained as patient terminated the interview prematurely.       PAST MEDICAL HISTORY: right hand pain, traumatic brain injury, post-traumatic headaches      SUBSTANCE HISTORY: Relevant substance history is noted in HPI.       PHYSICAL REVIEW OF SYSTEMS: Please refer to ED provider note dated 4/7/17.       FAMILY HISTORY: Father: alcohol use disorder       SOCIAL HISTORY: Could not be obtained as patient terminated the interview prematurely.     No Known Allergies    MEDICATIONS: I have reviewed patient's prior to admission medications.         LABORATORY DATA: Reviewed in Epic.  "      VITAL SIGNS: /79  Pulse 115  Temp 97  F (36.1  C) (Oral)  Resp 16  Ht 1.702 m (5' 7\")  Wt 60.8 kg (134 lb)  SpO2 98%  BMI 20.99 kg/m2      PHYSICAL EXAMINATION: Please refer to ED provider note dated 4/7/17.       MENTAL STATUS EXAMINATION: Patient is cooperative to the interview in the beginning but gets irritated when specifics about his alcohol and drug use are sought, and then he terminates the interview, apologizes and leaves. Otherwise, speech is fast in rate with normal volume. Language is intact. Mood is euthymic changing to irritable. Affect is congruent to mood. He was noticed to be restless. Thought processes linear and goal oriented. Associations are intact. Thought content is negative for suicidal/homicidal thoughts. Recent and remote memory are intact. Fund of knowledge is adequate. Attention and concentration are intact. Insight and judgment are limited. Gait and station are normal.        DIAGNOSES:   1. Alcohol use disorder  2. Stimulant use disorder  3. Unspecified bipolar disorder      PLAN:   1. Resume depakote for bipolar disorder at 500 mg bid.  2. Alcohol detox using Valium.   3. Disposition: Patient to discharge to home when he is out of detox.         "

## 2017-04-08 NOTE — PROGRESS NOTES
"CLINICAL NUTRITION SERVICES - ASSESSMENT NOTE     Nutrition Prescription    RECOMMENDATIONS FOR MDs/PROVIDERS TO ORDER:  None at this time    Malnutrition Status:    Unable to determine due to lack of nutrition/wt history    Recommendations already ordered by Registered Dietitian (RD):  Regular diet as tolerated    Future/Additional Recommendations:  If unable to consume >75% of meals TID, recommend Ensure plus supplements in between meals    Tameka Walker RD [facility] [phone]     REASON FOR ASSESSMENT  Kai Sims is a/an 41 year old male assessed by the dietitian for Admission Nutrition Risk Screen for reduced oral intake over the last month    NUTRITION HISTORY  Long history of substance dependence likely affecting quality and quantity of POs.  Pt inappropriate for visit d/t manic mood.    CURRENT NUTRITION ORDERS  Diet: Regular  Intake/Tolerance: unable to assess    LABS  Labs reviewed    MEDICATIONS  Medications reviewed    ANTHROPOMETRICS  Height: 170.2 cm (5' 7\")  Most Recent Weight: 60.8 kg (134 lb)    IBW: 67.3 kg  BMI: Normal BMI  Weight History: Last wt taken 5 years ago  Wt Readings from Last 10 Encounters:   04/07/17 60.8 kg (134 lb)   03/21/12 67.6 kg (149 lb)   ]  Dosing Weight: 61 kg    ASSESSED NUTRITION NEEDS  Estimated Energy Needs: 7705-7830 kcals/day (25 - 30 kcals/kg)  Justification: Maintenance  Estimated Protein Needs: 61-73 grams protein/day (1 - 1.2 grams of pro/kg)  Justification: Maintenance  Estimated Fluid Needs: 1800 mL/day (1 mL/kcal)   Justification: Maintenance    PHYSICAL FINDINGS  See malnutrition section below.    MALNUTRITION  % Intake: Unable to assess  % Weight Loss: Unable to assess  Subcutaneous Fat Loss: Facial region:  mild  Muscle Loss: None observed  Fluid Accumulation/Edema: None noted  Malnutrition Diagnosis: Unable to determine due to lack of nutrition/wt history    NUTRITION DIAGNOSIS  Predicted inadequate nutrient intake [kcal/pro] related to mood, decreased " appetite with withdrawals? as evidenced by unclear intakes PTA and currently 91% of IBW     INTERVENTIONS  Implementation  Nutrition Education: Not appropriate at this time due to patient condition   Collaboration with other providers - discussed with staff.  Staff requesting writer return once pt is calmer    Goals  Patient to consume % of nutritionally adequate meal trays TID, or the equivalent with supplements/snacks.     Monitoring/Evaluation  Progress toward goals will be monitored and evaluated per protocol.

## 2017-04-08 NOTE — PROGRESS NOTES
Pt increasingly hyper-verbal, and agitated. Patient increasingly hard to redirect. Zyprexa 10mg offered and given x 1 (see MAR). Pt took willingly.

## 2017-04-09 PROCEDURE — 36415 COLL VENOUS BLD VENIPUNCTURE: CPT | Performed by: PSYCHIATRY & NEUROLOGY

## 2017-04-09 PROCEDURE — 12800008 ZZH R&B CD ADULT

## 2017-04-09 PROCEDURE — 25000132 ZZH RX MED GY IP 250 OP 250 PS 637: Performed by: PSYCHIATRY & NEUROLOGY

## 2017-04-09 PROCEDURE — 87340 HEPATITIS B SURFACE AG IA: CPT | Performed by: PSYCHIATRY & NEUROLOGY

## 2017-04-09 PROCEDURE — 87389 HIV-1 AG W/HIV-1&-2 AB AG IA: CPT | Performed by: PSYCHIATRY & NEUROLOGY

## 2017-04-09 PROCEDURE — 86704 HEP B CORE ANTIBODY TOTAL: CPT | Performed by: PSYCHIATRY & NEUROLOGY

## 2017-04-09 RX ADMIN — NICOTINE POLACRILEX 4 MG: 4 LOZENGE ORAL at 10:02

## 2017-04-09 RX ADMIN — QUETIAPINE 25 MG: 25 TABLET, FILM COATED ORAL at 20:26

## 2017-04-09 RX ADMIN — ALUMINUM HYDROXIDE, MAGNESIUM HYDROXIDE, AND DIMETHICONE 30 ML: 400; 400; 40 SUSPENSION ORAL at 18:11

## 2017-04-09 RX ADMIN — NICOTINE POLACRILEX 4 MG: 4 LOZENGE ORAL at 15:37

## 2017-04-09 RX ADMIN — ATENOLOL 50 MG: 50 TABLET ORAL at 11:30

## 2017-04-09 RX ADMIN — MAGNESIUM HYDROXIDE 30 ML: 400 SUSPENSION ORAL at 20:27

## 2017-04-09 RX ADMIN — QUETIAPINE 25 MG: 25 TABLET, FILM COATED ORAL at 14:09

## 2017-04-09 RX ADMIN — DIAZEPAM 10 MG: 5 TABLET ORAL at 08:22

## 2017-04-09 RX ADMIN — DIAZEPAM 10 MG: 5 TABLET ORAL at 16:48

## 2017-04-09 RX ADMIN — FOLIC ACID 1 MG: 1 TABLET ORAL at 08:21

## 2017-04-09 RX ADMIN — GABAPENTIN 300 MG: 300 CAPSULE ORAL at 20:27

## 2017-04-09 RX ADMIN — NICOTINE POLACRILEX 4 MG: 4 LOZENGE ORAL at 17:43

## 2017-04-09 RX ADMIN — MULTIPLE VITAMINS W/ MINERALS TAB 1 TABLET: TAB at 08:22

## 2017-04-09 RX ADMIN — NICOTINE POLACRILEX 4 MG: 4 LOZENGE ORAL at 16:48

## 2017-04-09 RX ADMIN — DIAZEPAM 10 MG: 5 TABLET ORAL at 00:12

## 2017-04-09 RX ADMIN — DIAZEPAM 10 MG: 5 TABLET ORAL at 04:21

## 2017-04-09 RX ADMIN — OLANZAPINE 5 MG: 5 TABLET, ORALLY DISINTEGRATING ORAL at 18:11

## 2017-04-09 RX ADMIN — QUETIAPINE 25 MG: 25 TABLET, FILM COATED ORAL at 10:17

## 2017-04-09 RX ADMIN — NICOTINE POLACRILEX 4 MG: 4 LOZENGE ORAL at 14:09

## 2017-04-09 RX ADMIN — GABAPENTIN 300 MG: 300 CAPSULE ORAL at 13:41

## 2017-04-09 RX ADMIN — DIVALPROEX SODIUM 500 MG: 500 TABLET, DELAYED RELEASE ORAL at 08:22

## 2017-04-09 RX ADMIN — DIAZEPAM 10 MG: 5 TABLET ORAL at 11:30

## 2017-04-09 RX ADMIN — DIVALPROEX SODIUM 500 MG: 500 TABLET, DELAYED RELEASE ORAL at 20:27

## 2017-04-09 RX ADMIN — NICOTINE POLACRILEX 4 MG: 4 LOZENGE ORAL at 20:26

## 2017-04-09 RX ADMIN — GABAPENTIN 300 MG: 300 CAPSULE ORAL at 08:22

## 2017-04-09 RX ADMIN — NICOTINE POLACRILEX 4 MG: 4 LOZENGE ORAL at 08:22

## 2017-04-09 RX ADMIN — Medication 100 MG: at 08:21

## 2017-04-09 ASSESSMENT — ACTIVITIES OF DAILY LIVING (ADL)
LAUNDRY: WITH SUPERVISION
DRESS: INDEPENDENT;SCRUBS (BEHAVIORAL HEALTH)
GROOMING: INDEPENDENT
ORAL_HYGIENE: INDEPENDENT

## 2017-04-09 NOTE — PROGRESS NOTES
Pt requested/recieved prn seroquel 25 mg for anxiety (8/10 where 10 is the worst).    1410- Pt was offered/accepted prn seroquel 25 mg for increased anxiety.

## 2017-04-09 NOTE — PROGRESS NOTES
"Writer inquired about completion of paperwork for assessment. Pt states \"I haven't even really started it, I have the attention span of a peanut\".  Told pt that in order for writer to complete assessment today paperwork needs to be in within the next few hours. Pt acknowledged.     Update: Pt was working with PA on completing paperwork for 30 minutes. Pt is now in bed sleeping. Not yet completed.   "

## 2017-04-09 NOTE — PLAN OF CARE
"Problem: Substance Withdrawal  Goal: Substance Withdrawal  Problem: General Plan of Care (Inpatient Behavioral)  Goal: Individualization/ Patient Specific Goal (IP Behavioral)  The patient and/or their representative their patient-specific goals related to the plan of care.  Patient specific goals include:  1. Patient will be evaluated by a physician and labs will be evaluated.  2. Patient will be seen by a  for evaluation and discharge planning.  3. Patient will complete assessment paperwork  4. Patient will consume 75 % of meal to meet estimated energy needs.  5. Detoxification from alcohol using valium.  6. Patient will be provided with alcohol relapse prevention information.Signs and symptoms of listed problems will be absent or manageable.   Outcome: No Change  48 HOUR NURSING ASSESSMENT:     Pt has been pleasant and cooperative. Pt describes his mood as \"up and down\". Pt denies SI/SIB. Pt does endorse anxiety and requested/received prn Seroquel 25 mg this AM. Pt has been visible in common areas. Pt is medication compliant. Pt continues to have elevated pulses and continues to have elevated MSSA scores. Pt has received 120 mg of valium since hospital admission. Pt has received prn Seroquel 25 mg x 4 in the last 48 hours.     1130- Pt continues to have elevated MSSA scores-12 at this time. Pt also continues to have elevated pulses. 10 mg of valium was administered per protocol as well as prn atenolol 50 mg in an attempt to decrease pulse to WNL (pulses have been between 120 and 133 this shift).    1410- Pt's pulse has decreased to 93. "

## 2017-04-10 LAB
HBV CORE AB SERPL QL IA: NONREACTIVE
HBV SURFACE AG SERPL QL IA: NONREACTIVE
HIV 1+2 AB+HIV1 P24 AG SERPL QL IA: NORMAL

## 2017-04-10 PROCEDURE — 12800008 ZZH R&B CD ADULT

## 2017-04-10 PROCEDURE — 99207 ZZC CDG-MDM COMPONENT: MEETS LOW - DOWN CODED: CPT | Performed by: PSYCHIATRY & NEUROLOGY

## 2017-04-10 PROCEDURE — 25000132 ZZH RX MED GY IP 250 OP 250 PS 637: Performed by: PSYCHIATRY & NEUROLOGY

## 2017-04-10 PROCEDURE — 25000132 ZZH RX MED GY IP 250 OP 250 PS 637: Performed by: NURSE PRACTITIONER

## 2017-04-10 PROCEDURE — 99232 SBSQ HOSP IP/OBS MODERATE 35: CPT | Performed by: PSYCHIATRY & NEUROLOGY

## 2017-04-10 RX ORDER — LURASIDONE HYDROCHLORIDE 20 MG/1
20 TABLET, FILM COATED ORAL DAILY
Status: DISCONTINUED | OUTPATIENT
Start: 2017-04-10 | End: 2017-04-12

## 2017-04-10 RX ORDER — QUETIAPINE FUMARATE 25 MG/1
25-50 TABLET, FILM COATED ORAL 4 TIMES DAILY PRN
Status: DISCONTINUED | OUTPATIENT
Start: 2017-04-10 | End: 2017-04-14 | Stop reason: HOSPADM

## 2017-04-10 RX ORDER — LANOLIN ALCOHOL/MO/W.PET/CERES
100 CREAM (GRAM) TOPICAL DAILY
Status: DISCONTINUED | OUTPATIENT
Start: 2017-04-10 | End: 2017-04-14 | Stop reason: HOSPADM

## 2017-04-10 RX ORDER — LOPERAMIDE HCL 2 MG
2 CAPSULE ORAL 4 TIMES DAILY PRN
Status: DISCONTINUED | OUTPATIENT
Start: 2017-04-10 | End: 2017-04-14 | Stop reason: HOSPADM

## 2017-04-10 RX ADMIN — GABAPENTIN 300 MG: 300 CAPSULE ORAL at 20:32

## 2017-04-10 RX ADMIN — QUETIAPINE 50 MG: 25 TABLET, FILM COATED ORAL at 22:02

## 2017-04-10 RX ADMIN — HYDROXYZINE HYDROCHLORIDE 50 MG: 25 TABLET ORAL at 09:49

## 2017-04-10 RX ADMIN — NICOTINE POLACRILEX 8 MG: 4 LOZENGE ORAL at 00:35

## 2017-04-10 RX ADMIN — NICOTINE POLACRILEX 8 MG: 4 LOZENGE ORAL at 07:01

## 2017-04-10 RX ADMIN — GABAPENTIN 300 MG: 300 CAPSULE ORAL at 08:22

## 2017-04-10 RX ADMIN — DIVALPROEX SODIUM 500 MG: 500 TABLET, DELAYED RELEASE ORAL at 08:22

## 2017-04-10 RX ADMIN — MULTIPLE VITAMINS W/ MINERALS TAB 1 TABLET: TAB at 08:22

## 2017-04-10 RX ADMIN — NICOTINE POLACRILEX 8 MG: 4 LOZENGE ORAL at 17:55

## 2017-04-10 RX ADMIN — FOLIC ACID 1 MG: 1 TABLET ORAL at 08:22

## 2017-04-10 RX ADMIN — NICOTINE POLACRILEX 8 MG: 4 LOZENGE ORAL at 16:39

## 2017-04-10 RX ADMIN — DIAZEPAM 5 MG: 5 TABLET ORAL at 08:22

## 2017-04-10 RX ADMIN — ATENOLOL 50 MG: 50 TABLET ORAL at 14:04

## 2017-04-10 RX ADMIN — NICOTINE POLACRILEX 8 MG: 4 LOZENGE ORAL at 22:02

## 2017-04-10 RX ADMIN — DIVALPROEX SODIUM 500 MG: 500 TABLET, DELAYED RELEASE ORAL at 20:32

## 2017-04-10 RX ADMIN — QUETIAPINE 50 MG: 25 TABLET, FILM COATED ORAL at 14:37

## 2017-04-10 RX ADMIN — ALUMINUM HYDROXIDE, MAGNESIUM HYDROXIDE, AND DIMETHICONE 30 ML: 400; 400; 40 SUSPENSION ORAL at 12:35

## 2017-04-10 RX ADMIN — NICOTINE POLACRILEX 8 MG: 4 LOZENGE ORAL at 21:07

## 2017-04-10 RX ADMIN — LURASIDONE HYDROCHLORIDE 20 MG: 20 TABLET, FILM COATED ORAL at 08:35

## 2017-04-10 RX ADMIN — DIAZEPAM 10 MG: 5 TABLET ORAL at 11:54

## 2017-04-10 RX ADMIN — NICOTINE POLACRILEX 8 MG: 4 LOZENGE ORAL at 08:22

## 2017-04-10 RX ADMIN — NICOTINE POLACRILEX 8 MG: 4 LOZENGE ORAL at 11:54

## 2017-04-10 RX ADMIN — MIRTAZAPINE 15 MG: 15 TABLET, FILM COATED ORAL at 21:07

## 2017-04-10 RX ADMIN — Medication 100 MG: at 08:22

## 2017-04-10 RX ADMIN — QUETIAPINE 50 MG: 25 TABLET, FILM COATED ORAL at 10:27

## 2017-04-10 RX ADMIN — LOPERAMIDE HYDROCHLORIDE 2 MG: 2 CAPSULE ORAL at 20:33

## 2017-04-10 RX ADMIN — NICOTINE POLACRILEX 8 MG: 4 LOZENGE ORAL at 13:52

## 2017-04-10 RX ADMIN — NICOTINE POLACRILEX 8 MG: 4 LOZENGE ORAL at 14:38

## 2017-04-10 RX ADMIN — NICOTINE POLACRILEX 8 MG: 4 LOZENGE ORAL at 09:49

## 2017-04-10 RX ADMIN — DIAZEPAM 10 MG: 5 TABLET ORAL at 00:12

## 2017-04-10 RX ADMIN — DIAZEPAM 10 MG: 5 TABLET ORAL at 20:32

## 2017-04-10 RX ADMIN — ALUMINUM HYDROXIDE, MAGNESIUM HYDROXIDE, AND DIMETHICONE 30 ML: 400; 400; 40 SUSPENSION ORAL at 16:44

## 2017-04-10 RX ADMIN — GABAPENTIN 300 MG: 300 CAPSULE ORAL at 13:52

## 2017-04-10 RX ADMIN — DIAZEPAM 10 MG: 5 TABLET ORAL at 16:39

## 2017-04-10 ASSESSMENT — ACTIVITIES OF DAILY LIVING (ADL)
ORAL_HYGIENE: INDEPENDENT
LAUNDRY: WITH SUPERVISION
GROOMING: INDEPENDENT
DRESS: INDEPENDENT

## 2017-04-10 NOTE — PROGRESS NOTES
"[facility], [place]   Psychiatric Progress Note    Interim history   This is a 41 year old male with alcohol dependence, bipolar affective dx.Pt seen in rounds. Patient's mood is good  In 2003-2209 he was in long term , he was sober for the most part and he was feeling happy, he would talk fast, hears something , would go days  Without sleep and still have the energy.  He feels \"people are talking about me and whispering about me,\"  \"People are out of get me\"  Energy Level:too good  Sleep:No concerns, sleeps well through night  Appetite:normal motivation interest good   deneid any Suicidal/homicidal ideation/plan intent.  deneid any psychosis  No prior suicde attempts  No access to gun  Pt is in detox  Pt mssa/cows score are monitered  Tolerating meds and has no side effects.              Medications:     Current Facility-Administered Medications   Medication     OLANZapine zydis (zyPREXA) ODT tab 5-10 mg    Or     OLANZapine (zyPREXA) injection 5-10 mg     divalproex (DEPAKOTE) EC tablet 500 mg     gabapentin (NEURONTIN) capsule 300 mg     mirtazapine (REMERON) tablet 15 mg     QUEtiapine (SEROquel) tablet 25 mg     diazepam (VALIUM) tablet 5-20 mg     atenolol (TENORMIN) tablet 50 mg     folic acid (FOLVITE) tablet 1 mg     multivitamin, therapeutic with minerals (THERA-VIT-M) tablet 1 tablet     hydrOXYzine (ATARAX) tablet 25-50 mg     acetaminophen (TYLENOL) tablet 650 mg     alum & mag hydroxide-simethicone (MYLANTA ES/MAALOX  ES) suspension 30 mL     magnesium hydroxide (MILK OF MAGNESIA) suspension 30 mL     bisacodyl (DULCOLAX) Suppository 10 mg     traZODone (DESYREL) tablet 50 mg     nicotine polacrilex lozenge 4-8 mg             Allergies:   No Known Allergies         Psychiatric Examination:   Blood pressure 117/82, pulse 104, temperature 98  F (36.7  C), temperature source Oral, resp. rate 16, height 1.702 m (5' 7\"), weight 60.8 kg (134 lb), SpO2 98 %.  Weight is 134 lbs 0 oz  " Body mass index is 20.99 kg/(m^2).    Appearance:  awake, alert and adequately groomed  Attitude:  cooperative  Eye Contact:  good  Mood:  better  Affect:  appropriate and in normal range and mood congruent  Speech:  clear, coherent rate /rhythm are good  Psychomotor Behavior:  no evidence of tardive dyskinesia, dystonia, or tics and intact station, gait and muscle tone  Throught Process:  logical  Associations:  no loose associations  Thought Content:  no evidence of suicidal ideation or homicidal ideation, no evidence of psychotic thought, no auditory hallucinations present and no visual hallucinations present  Insight:  limited  Judgement:  intact  Oriented to:  time, person, and place  Attention Span and Concentration:  intact  Recent and Remote Memory:  intact  Language fund of knowledge are adequate         Labs:     No results found for: NTBNPI, NTBNP  Lab Results   Component Value Date    WBC 5.4 04/08/2017    HGB 15.2 04/08/2017    HCT 44.5 04/08/2017    MCV 90 04/08/2017     04/08/2017     Lab Results   Component Value Date    TSH 0.27 (L) 03/22/2012          Dx   Alcohol use dx  bipolar affective dx most recent depressed with psychosis    Plan  Will contiue to detox of mssa on valium   will continue depakote 500 mg po bid,will order level  Will add latuda 20 mg po qd for paranoia  Will d/c   zyprexa prn  Will keep seroquel prn and taper off it gradually   Laboratory/Imaging: reviewed with patient   Consults: internal medicine consult reviewed  Patient will be treated in therapeutic milieu with appropriate individual and group therapies as described.          Legal Status: voluntary    Safety Assessment:   Checks:  15 min  Precautions: withdrawal precautions  Pt has not required locked seclusion or restraints in the past 24 hours to maintain safety, please refer to RN documentation for further details.    Able to give informed consent:  YES   Discussed Risks/Benefits/Side Effects/Alternatives:  YES    After discussion of the indications, risks, benefits, alternatives and consequences of no treatment, the patient elects to complete detox and do trt.

## 2017-04-10 NOTE — PROGRESS NOTES
Pt signed Fatoumata and Writer spoke with Pt's , Sukhjinder Thompson ([phone]).  Donna reports Pt stabilizes well during sobriety and does well in the traditional treatment setting.  Donna states he sees Pt mental health diagnosis as more of a result from his chemical use, specifically methamphetamine and that Pt is appropriate for the [facility].  Pt plans to go to [facility] following treatment and requires 30 days sober to qualify for the transitional housing program with them.  Plan is to continue to pursue LP with referral to [facility] following completion of the program.

## 2017-04-10 NOTE — PROVIDER NOTIFICATION
04/10/17 1403   Vital Signs   Pulse 119   Pulse/Heart Rate Source Monitor     Patient running tachycardic. Atenolol 50mg given per order (see MAR). Pt MSSA upon noon check = 11, patient medicated with 10mg valium per unit protocol.

## 2017-04-10 NOTE — PLAN OF CARE
"Problem: Substance Withdrawal  Goal: Substance Withdrawal  Problem: General Plan of Care (Inpatient Behavioral)  Goal: Individualization/ Patient Specific Goal (IP Behavioral)  The patient and/or their representative their patient-specific goals related to the plan of care.  Patient specific goals include:  1. Patient will be evaluated by a physician and labs will be evaluated.  2. Patient will be seen by a  for evaluation and discharge planning.  3. Patient will complete assessment paperwork  4. Patient will consume 75 % of meal to meet estimated energy needs.  5. Detoxification from alcohol using valium.  6. Patient will be provided with alcohol relapse prevention information.Signs and symptoms of listed problems will be absent or manageable.   Outcome: No Change     Patient up and visible in the milieu. Pt attending/participating in groups. Pt's speech is pressured, pt is hyper-verbal. Pt needing redirection on the unit for poor boundaries at times. Pt responds well to re-direction and verbalizes understanding of maintaining good boundaries with staff and other patients. Pt MSSA in am = 8, patient medicated with 5mg valium per unit protocol. Pt reports high anxiety. Pt requested and received seroquel 50mg x 1, and hydroxyzine 50mg x 1 for anxiety/agitation. Pt appears to be responding to internal stimuli. Pt displays paranoid behaviors and stated to staff, \"why was that person looking at me so funny?\" Pt could not state who he was talking about at that time.      Pt denies SI/SIB/HI. Pt denies any current medication side effects or medical issue to this RN. Pt encouraged to drink plenty of fluids. Pt reports a good appetite. Case management and Nursing care continues.      B/P: 117/82, T: 98, P: 104, R: 16      "

## 2017-04-11 LAB — VALPROATE SERPL-MCNC: 66 MG/L (ref 50–100)

## 2017-04-11 PROCEDURE — 12800008 ZZH R&B CD ADULT

## 2017-04-11 PROCEDURE — 80164 ASSAY DIPROPYLACETIC ACD TOT: CPT | Performed by: PSYCHIATRY & NEUROLOGY

## 2017-04-11 PROCEDURE — 25000132 ZZH RX MED GY IP 250 OP 250 PS 637: Performed by: PSYCHIATRY & NEUROLOGY

## 2017-04-11 PROCEDURE — 99207 ZZC NON-BILLABLE SERV PER CHARTING: CPT | Performed by: PHYSICIAN ASSISTANT

## 2017-04-11 PROCEDURE — 36415 COLL VENOUS BLD VENIPUNCTURE: CPT | Performed by: PSYCHIATRY & NEUROLOGY

## 2017-04-11 PROCEDURE — 25000132 ZZH RX MED GY IP 250 OP 250 PS 637: Performed by: NURSE PRACTITIONER

## 2017-04-11 PROCEDURE — 99232 SBSQ HOSP IP/OBS MODERATE 35: CPT | Performed by: PSYCHIATRY & NEUROLOGY

## 2017-04-11 RX ORDER — DIVALPROEX SODIUM 500 MG/1
500 TABLET, DELAYED RELEASE ORAL EVERY 8 HOURS SCHEDULED
Status: DISCONTINUED | OUTPATIENT
Start: 2017-04-11 | End: 2017-04-13

## 2017-04-11 RX ADMIN — QUETIAPINE 25 MG: 25 TABLET, FILM COATED ORAL at 08:33

## 2017-04-11 RX ADMIN — DIVALPROEX SODIUM 500 MG: 500 TABLET, DELAYED RELEASE ORAL at 08:33

## 2017-04-11 RX ADMIN — DIVALPROEX SODIUM 500 MG: 500 TABLET, DELAYED RELEASE ORAL at 13:56

## 2017-04-11 RX ADMIN — NICOTINE POLACRILEX 8 MG: 4 LOZENGE ORAL at 21:40

## 2017-04-11 RX ADMIN — GABAPENTIN 300 MG: 300 CAPSULE ORAL at 20:18

## 2017-04-11 RX ADMIN — DIAZEPAM 5 MG: 5 TABLET ORAL at 16:22

## 2017-04-11 RX ADMIN — QUETIAPINE 50 MG: 25 TABLET, FILM COATED ORAL at 18:06

## 2017-04-11 RX ADMIN — NICOTINE POLACRILEX 8 MG: 4 LOZENGE ORAL at 18:06

## 2017-04-11 RX ADMIN — NICOTINE POLACRILEX 8 MG: 4 LOZENGE ORAL at 12:25

## 2017-04-11 RX ADMIN — QUETIAPINE 25 MG: 25 TABLET, FILM COATED ORAL at 21:40

## 2017-04-11 RX ADMIN — HYDROXYZINE HYDROCHLORIDE 50 MG: 25 TABLET ORAL at 12:24

## 2017-04-11 RX ADMIN — DIAZEPAM 5 MG: 5 TABLET ORAL at 08:33

## 2017-04-11 RX ADMIN — LOPERAMIDE HYDROCHLORIDE 2 MG: 2 CAPSULE ORAL at 08:33

## 2017-04-11 RX ADMIN — FOLIC ACID 1 MG: 1 TABLET ORAL at 08:33

## 2017-04-11 RX ADMIN — NICOTINE POLACRILEX 8 MG: 4 LOZENGE ORAL at 06:52

## 2017-04-11 RX ADMIN — NICOTINE POLACRILEX 8 MG: 4 LOZENGE ORAL at 20:18

## 2017-04-11 RX ADMIN — DIAZEPAM 10 MG: 5 TABLET ORAL at 00:23

## 2017-04-11 RX ADMIN — DIVALPROEX SODIUM 500 MG: 500 TABLET, DELAYED RELEASE ORAL at 21:41

## 2017-04-11 RX ADMIN — DIAZEPAM 5 MG: 5 TABLET ORAL at 12:24

## 2017-04-11 RX ADMIN — NICOTINE POLACRILEX 8 MG: 4 LOZENGE ORAL at 03:51

## 2017-04-11 RX ADMIN — DIAZEPAM 5 MG: 5 TABLET ORAL at 03:51

## 2017-04-11 RX ADMIN — NICOTINE POLACRILEX 8 MG: 4 LOZENGE ORAL at 09:55

## 2017-04-11 RX ADMIN — NICOTINE POLACRILEX 8 MG: 4 LOZENGE ORAL at 16:23

## 2017-04-11 RX ADMIN — MIRTAZAPINE 15 MG: 15 TABLET, FILM COATED ORAL at 21:40

## 2017-04-11 RX ADMIN — LURASIDONE HYDROCHLORIDE 20 MG: 20 TABLET, FILM COATED ORAL at 08:33

## 2017-04-11 RX ADMIN — Medication 100 MG: at 08:33

## 2017-04-11 RX ADMIN — DIAZEPAM 10 MG: 5 TABLET ORAL at 20:18

## 2017-04-11 RX ADMIN — GABAPENTIN 300 MG: 300 CAPSULE ORAL at 13:56

## 2017-04-11 RX ADMIN — NICOTINE POLACRILEX 8 MG: 4 LOZENGE ORAL at 08:33

## 2017-04-11 RX ADMIN — MULTIPLE VITAMINS W/ MINERALS TAB 1 TABLET: TAB at 08:33

## 2017-04-11 RX ADMIN — ALUMINUM HYDROXIDE, MAGNESIUM HYDROXIDE, AND DIMETHICONE 30 ML: 400; 400; 40 SUSPENSION ORAL at 13:56

## 2017-04-11 RX ADMIN — NICOTINE POLACRILEX 8 MG: 4 LOZENGE ORAL at 13:56

## 2017-04-11 RX ADMIN — GABAPENTIN 300 MG: 300 CAPSULE ORAL at 08:33

## 2017-04-11 RX ADMIN — QUETIAPINE 50 MG: 25 TABLET, FILM COATED ORAL at 12:24

## 2017-04-11 RX ADMIN — NICOTINE POLACRILEX 8 MG: 4 LOZENGE ORAL at 00:23

## 2017-04-11 ASSESSMENT — ACTIVITIES OF DAILY LIVING (ADL)
GROOMING: INDEPENDENT
DRESS: INDEPENDENT
LAUNDRY: WITH SUPERVISION
ORAL_HYGIENE: INDEPENDENT
LAUNDRY: WITH SUPERVISION
ORAL_HYGIENE: INDEPENDENT
DRESS: STREET CLOTHES
GROOMING: INDEPENDENT

## 2017-04-11 NOTE — PROGRESS NOTES
Asked to see for hyperlipidemia.    Note patient has mildly elevated cholesterol at 214, LDL 74, HDL of 119, normal TG.   -Would not treat given elevated transaminases, recent alcohol abuse and statin being medication of choice  -Needs PCP f/u within 3 months for repeat LFTs and repeat cholesterol panel    Mixed issues and story with urinary frequency yesterday, possible difficulty initiating stream today. Will check UA and monitor for now. Can bladder scan PRN if unable to void, or check post void residuals if feels incomplete emptying is a factor. Will order bladder scan PRN and discuss with nursing.     VIVIANA Kennedy

## 2017-04-11 NOTE — DISCHARGE INSTRUCTIONS
Behavioral Discharge Planning and Instructions  THANK YOU FOR CHOOSING THE [facility]  3A  [phone]    Summary: You were admitted to [facility] on 4/7/2017 for Alcohol dependence   You are being discharged to [facility]  Please take care and make your recovery a priority, Kai!        Main Diagnosis:  Alcohol use disorder - severe    Major Treatments, Procedures and Findings:  You received medical treatment for the symptoms of alcohol withdrawal. A medical exam was performed that included lab work. You have met with a .       Symptoms to Report:  If you experience more anxiety, confusion, sleeplessness, deep sadness or thoughts of suicide, notify your treatment team or notify your primary care physician. IF ANY OF THE SYMPTOMS YOU ARE EXPERIENCING ARE A MEDICAL EMERGENCY CALL 911 IMMEDIATELY.     Lifestyle Adjustment: Adjust your lifestyle to get enough sleep, relaxation, exercise and  good nutrition. Continue to develop healthy coping skills to decrease stress and promote a sober living environment. Do not use alcohol, illegal drugs or addictive medications other than what is currently prescribed. AA, NA, and  Sponsor are good resources for support.     Primary Provider:  1. Alcohol use disorder  2. Stimulant use disorder  3. Unspecified bipolar disorder    Psychiatry Follow-up:       Resources:     [facility] [phone]    Support Group:  AA/NA and Sponsor/support    Crisis Intervention: 815.512.2893 or 189-128-9749 (TTY: 123.606.8063).  Call anytime for help.  [place] on Mental Illness (www.[place].org): 174.776.1594 or 715-750-0171.  Alcoholics Anonymous (www.alcoholics-anonymous.org): Check your phone book for your local chapter.  Suicide Awareness Voices of Education (SAVE) (www.save.org): 761-725-NBRJ (1045)  National Suicide Prevention Line (www.mentalhealthmn.org): 464-458-CCTI (3675)  Mental Health Consumer/Survivor Network of [place]  (www.[facility].net): 472-643-1756 or 721-566-2804  Mental Health Association of [place] (www.mentalhealth.org): 521.362.8997 or 184-496-7385   Substance Abuse and Mental Health Services (www.samhsa.gov)    [place] Recovery Connection ([facility])  [facility] connects people seeking recovery to resources that help foster and sustain long-term recovery.  Whether you are seeking resources for treatment, transportation, housing, job training, education, health care or other pathways to recovery, [facility] is a great place to start.  331.695.2967.  Www.[facility].org    General Medication Instructions:   See your medication sheet(s) for instructions.   Take all medicines as directed.  Make no changes unless your doctor suggests them.   Go to all your doctor visits.  Be sure to have all your required lab tests. This way, your medicines can be refilled on time.  Do not use any drugs not prescribed by your provider.  AA/NA and Sponsors are excellent resources for support  Avoid alcohol.    Please Note:  Please take this discharge folder with you to all your follow up appointments, it contains your lab results, diagnosis, medication list and discharge recommendations.

## 2017-04-11 NOTE — PROGRESS NOTES
"Patient reports he has been able to void today, yesterday (4/10/17) he told this RN \"I can't stop peeing.\" Pt reports he forgot to provide UA sample, and was instructed to provide one as soon as possible. Internal medicine notified of hyperlipidemia and patient previous complaints. Bladder scan PRN or post void residuals ordered if indicated. Pt sitting comfortably in lounge at this time.   "

## 2017-04-11 NOTE — PROGRESS NOTES
Internal medicine paged and notified of patient urinary retention and hyperlipidemia, Dr. Patel has requested patient to be assessed today. Pt given UA cup to complete.
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
## 2017-04-11 NOTE — PLAN OF CARE
Problem: Substance Withdrawal  Goal: Substance Withdrawal  Problem: General Plan of Care (Inpatient Behavioral)  Goal: Individualization/ Patient Specific Goal (IP Behavioral)  The patient and/or their representative their patient-specific goals related to the plan of care.  Patient specific goals include:  1. Patient will be evaluated by a physician and labs will be evaluated.  2. Patient will be seen by a  for evaluation and discharge planning.  3. Patient will complete assessment paperwork  4. Patient will consume 75 % of meal to meet estimated energy needs.  5. Detoxification from alcohol using valium.  6. Patient will be provided with alcohol relapse prevention information.Signs and symptoms of listed problems will be absent or manageable.   Outcome: Improving     Patient up and visible in the milieu. Pt remains hyperactive, intrusive, with poor boundaries. Pt responds well to redirection. Pt's speech is mildly pressured, hyper-verbal. Pt tolerating medications well, denies any current side effects. MSSA in am = 8, patient medicated with 5mg valium. Pt given some educational materials regarding stress/anxiety techniques. Pt reported loose stool in the AM, imodium 2mg given (See MAR). Pt reports this has been helpful. Pt rates anxiety high. Pt requested and received seroquel 25mg x 1 (See MAR). Patient has yet to turn in UA sample to staff.      B/P: 117/76, T: 97.2, P: 90, R: 16

## 2017-04-11 NOTE — PROGRESS NOTES
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 giving notice.    8B. What is your long term plan for where you will be living?     [facility]    8C. Tell me about your living environment/neighborhood? Ask enough follow up questions to determine safety, criminal activity, availability of alcohol and drugs, supportive or antagonistic to the person making changes.      Drug riddled.  That's why he is moving.    9. Criminal justice history: Gather current/recent history and any significant history related to substance use--Arrests? Convictions? Circumstances? Alcohol or drug involvement? Sentences? Still on probation or parole? Expectations of the court? Current court order? Any sex offenses - lifetime? What level? (DSM)    Hx of serving time in skilled nursing [id number].    No current legal problems    10. What obstacles exist to participating in treatment? (Time off work, childcare, funding, transportation, pending snf time, living situation)     None    Dimension VI Ratings   Recovery environment - The placing authority must use the criteria in Dimension VI to determine a client s recovery environment.   RISK DESCRIPTIONS - Severity ratin Client has (A) Chronically  antagonistic significant other, living environment, family, peer group or long-term criminal justice involvement that is harmful to recovery or treatment progress, or (B) Client has an actively antagonistic significant other, family, work, or living environment with immediate threat to the client s safety and well-being.    REASONS SEVERITY WAS ASSIGNED - (What support does the person have for making changes? What structure/stability does the person have in his or her daily life that will increase the likelihood that changes can be sustained? What problems exist in the person s environment that will jeopardize getting/staying clean and sober?)     Leaving apartment due to unsafe and drug access  Hx of legal but no current  Plans to go to [facility] after treatment  Attends  and has a home group and sponsor.  Receives [place] for income.         Client Choice/Exceptions   Would you like services specific to language, age, gender, culture, Rastafarian preference, race, ethnicity, sexual orientation or disability?  No    What particular treatment choices and options would you like to have? NA    Do you have a preference for a particular treatment program? [facility]    Criteria for Diagnosis     Criteria for Diagnosis  DSM-5 Criteria for Substance Use Disorder  Instructions: Determine whether the client currently meets the criteria for Substance Use Disorder using the diagnostic criteria in the DSM-V pp.481-589. Current means during the most recent 12 months outside a facility that controls access to substances    Category of Substance Severity (ICD-10 Code / DSM 5 Code)     Alcohol Use Disorder Severe  (10.20) (303.90)   Cannabis Use Disorder Moderate  (F12.20) (304.30)   Hallucinogen Use Disorder NA   Inhalant Use Disorder NA   Opioid Use Disorder NA   Sedative, Hypnotic, or Anxiolytic Use Disorder NA   Stimulant Related Disorder Severe   (F15.20) (304.40) Amphetamine type substance   Tobacco Use Disorder Moderate    (F17.200) (305.1)   Other (or unknown) Substance Use Disorder NA       Collateral Contact Summary   Number of contacts made: 1    Contact with referring person:  NA.    If court related records were reviewed, summarize here: NA    Information from collateral contacts supported/largely agreed with information from the client and associated risk ratings.      Rule 25 Assessment Summary and Plan   's Recommendation    [facility]  [facility]  Take medications as prescribed  Keep appointments with providers  Support group participation with a sponsor  Abstain from the use of all mood altering chemicals unless prescribed by your mental health provider        Collateral Contacts     Name:    [facility]   Relationship:       Phone Number:     Releases:         David Lawler MD Physician Signed Psychiatry H&P   Date of Service: 4/8/2017  4:33 PM Note Created: 4/8/2017  4:33 PM           DATE OF ADMISSION: 4/7/17.       DATE OF SERVICE: 4/8/2017.       CHIEF COMPLAINT: Alcohol detox      HISTORY OF PRESENT ILLNESS:Kai Sims is a 41 year old male who presents to  through the ED seeking detox from alcohol. Patient has a history of alcohol dependence, bipolar disorder, polysubstance abuse. Patient has been drinking vodka up to 1 L per day. His last drink was 2 days ago. He states in the past he has experienced significant alcohol withdrawal including alcohol withdrawal seizure 2 or 3 years ago. He has a plan to enter [facility] chemical dependency treatment, but feels he would like to go through detox 1st. In the past has used marijuana and methamphetamine but estimates at least 2 weeks since his last use. He states that he uses meth and marijuana on a regular basis. He has cravings to use alcohol, and fears he cannot stay sober until entering the treatment program, and hence requested detox. He does have a history of bipolar disorder, is off of his medications for 1 year.  "He denied any acute mental health symptoms. When asked to further elaborate on his alcohol and meth use, he refused to do so stating that he won't be able to answer those questions today without getting irritable since he is not in a good mood. Then he apologized and left the exam room.       PAST PSYCHIATRIC HISTORY: Details could not be obtained as patient terminated the interview prematurely.       PAST MEDICAL HISTORY: right hand pain, traumatic brain injury, post-traumatic headaches      SUBSTANCE HISTORY: Relevant substance history is noted in HPI.       PHYSICAL REVIEW OF SYSTEMS: Please refer to ED provider note dated 4/7/17.       FAMILY HISTORY: Father: alcohol use disorder      SOCIAL HISTORY: Could not be obtained as patient terminated the interview prematurely.      No Known Allergies     MEDICATIONS: I have reviewed patient's prior to admission medications.       LABORATORY DATA: Reviewed in Epic.       VITAL SIGNS: /79  Pulse 115  Temp 97  F (36.1  C) (Oral)  Resp 16  Ht 1.702 m (5' 7\")  Wt 60.8 kg (134 lb)  SpO2 98%  BMI 20.99 kg/m2      PHYSICAL EXAMINATION: Please refer to ED provider note dated 4/7/17.       MENTAL STATUS EXAMINATION: Patient is cooperative to the interview in the beginning but gets irritated when specifics about his alcohol and drug use are sought, and then he terminates the interview, apologizes and leaves. Otherwise, speech is fast in rate with normal volume. Language is intact. Mood is euthymic changing to irritable. Affect is congruent to mood. He was noticed to be restless. Thought processes linear and goal oriented. Associations are intact. Thought content is negative for suicidal/homicidal thoughts. Recent and remote memory are intact. Fund of knowledge is adequate. Attention and concentration are intact. Insight and judgment are limited. Gait and station are normal.        DIAGNOSES:   1. Alcohol use disorder  2. Stimulant use disorder  3. Unspecified bipolar " disorder      PLAN:   1. Resume depakote for bipolar disorder at 500 mg bid.  2. Alcohol detox using Valium.   3. Disposition: Patient to discharge to home when he is out of detox.                                    Collateral Contacts     Name:       Relationship:       Phone Number:       Releases:             ollateral Contacts      A problematic pattern of alcohol/drug use leading to clinically significant impairment or distress, as manifested by at least two of the following, occurring within a 12-month period:    Alcohol/drug is often taken in larger amounts or over a longer period than was intended.  There is a persistent desire or unsuccessful efforts to cut down or control alcohol/drug use  A great deal of time is spent in activities necessary to obtain alcohol, use alcohol, or recover from its effects.  Craving, or a strong desire or urge to use alcohol/drug  Recurrent alcohol/drug use resulting in a failure to fulfill major role obligations at work, school or home.  Continued alcohol use despite having persistent or recurrent social or interpersonal problems caused or exacerbated by the effects of alcohol/drug.  Important social, occupational, or recreational activities are given up or reduced because of alcohol/drug use.  Recurrent alcohol/drug use in situations in which it is physically hazardous.  Alcohol/drug use is continued despite knowledge of having a persistent or recurrent physical or psychological problem that is likely to have been caused or exacerbated by alcohol.  Tolerance, as defined by either of the following: A need for markedly increased amounts of alcohol/drug to achieve intoxication or desired effect. and A markedly diminished effect with continued use of the same amount of alcohol/drug.  Withdrawal, as manifested by either of the following: The characteristic withdrawal syndrome for alcohol/drug (refer to Criteria A and B of the criteria set for alcohol/drug withdrawal). and  Alcohol/drug (or a closely related substance, such as a benzodiazepine) is taken to relieve or avoid withdrawal symptoms.      Specify if: In early remission:  After full criteria for alcohol/drug use disorder were previously met, none of the criteria for alcohol/drug use disorder have been met for at least 3 months but for less than 12 months (with the exception that Criterion A4,  Craving or a strong desire or urge to use alcohol/drug  may be met).     In sustained remission:   After full criteria for alcohol use disorder were previously met, non of the criteria for alcohol/drug use disorder have been met at any time during a period of 12 months or longer (with the exception that Criterion A4,  Craving or strong desire or urge to use alcohol/drug  may be met).   Specify if:   This additional specifier is used if the individual is in an environment where access to alcohol is restricted.    Mild: Presence of 2-3 symptoms    Moderate: Presence of 4-5 symptoms    Severe: Presence of 6 or more symptoms

## 2017-04-11 NOTE — PROGRESS NOTES
Met with Pt and completed assessment and additional needs for transfer to [facility].  Pt may transfer when medically stable and bed is available.  Pt shares that he knows he needs to go door to door to p[revent relapse.

## 2017-04-12 LAB
ALBUMIN UR-MCNC: NEGATIVE MG/DL
APPEARANCE UR: CLEAR
BACTERIA #/AREA URNS HPF: ABNORMAL /HPF
BILIRUB UR QL STRIP: NEGATIVE
COLOR UR AUTO: ABNORMAL
GLUCOSE UR STRIP-MCNC: NEGATIVE MG/DL
HGB UR QL STRIP: NEGATIVE
KETONES UR STRIP-MCNC: NEGATIVE MG/DL
LEUKOCYTE ESTERASE UR QL STRIP: NEGATIVE
MUCOUS THREADS #/AREA URNS LPF: PRESENT /LPF
NITRATE UR QL: NEGATIVE
PH UR STRIP: 7 PH (ref 5–7)
RBC #/AREA URNS AUTO: 0 /HPF (ref 0–2)
SP GR UR STRIP: 1 (ref 1–1.03)
URN SPEC COLLECT METH UR: ABNORMAL
UROBILINOGEN UR STRIP-MCNC: NORMAL MG/DL (ref 0–2)
WBC #/AREA URNS AUTO: 0 /HPF (ref 0–2)

## 2017-04-12 PROCEDURE — 81001 URINALYSIS AUTO W/SCOPE: CPT | Performed by: PSYCHIATRY & NEUROLOGY

## 2017-04-12 PROCEDURE — 12800008 ZZH R&B CD ADULT

## 2017-04-12 PROCEDURE — 25000132 ZZH RX MED GY IP 250 OP 250 PS 637: Performed by: PSYCHIATRY & NEUROLOGY

## 2017-04-12 RX ORDER — LURASIDONE HYDROCHLORIDE 20 MG/1
20 TABLET, FILM COATED ORAL 2 TIMES DAILY
Status: DISCONTINUED | OUTPATIENT
Start: 2017-04-12 | End: 2017-04-14 | Stop reason: HOSPADM

## 2017-04-12 RX ADMIN — DIAZEPAM 10 MG: 5 TABLET ORAL at 08:06

## 2017-04-12 RX ADMIN — NICOTINE POLACRILEX 8 MG: 4 LOZENGE ORAL at 08:24

## 2017-04-12 RX ADMIN — GABAPENTIN 300 MG: 300 CAPSULE ORAL at 20:27

## 2017-04-12 RX ADMIN — NICOTINE POLACRILEX 8 MG: 4 LOZENGE ORAL at 03:14

## 2017-04-12 RX ADMIN — LURASIDONE HYDROCHLORIDE 20 MG: 20 TABLET, FILM COATED ORAL at 08:06

## 2017-04-12 RX ADMIN — DIVALPROEX SODIUM 500 MG: 500 TABLET, DELAYED RELEASE ORAL at 14:36

## 2017-04-12 RX ADMIN — NICOTINE POLACRILEX 8 MG: 4 LOZENGE ORAL at 06:32

## 2017-04-12 RX ADMIN — GABAPENTIN 300 MG: 300 CAPSULE ORAL at 14:36

## 2017-04-12 RX ADMIN — QUETIAPINE 50 MG: 25 TABLET, FILM COATED ORAL at 21:33

## 2017-04-12 RX ADMIN — LURASIDONE HYDROCHLORIDE 20 MG: 20 TABLET, FILM COATED ORAL at 20:27

## 2017-04-12 RX ADMIN — FOLIC ACID 1 MG: 1 TABLET ORAL at 08:06

## 2017-04-12 RX ADMIN — NICOTINE POLACRILEX 8 MG: 4 LOZENGE ORAL at 17:16

## 2017-04-12 RX ADMIN — NICOTINE POLACRILEX 8 MG: 4 LOZENGE ORAL at 20:27

## 2017-04-12 RX ADMIN — QUETIAPINE 25 MG: 25 TABLET, FILM COATED ORAL at 08:06

## 2017-04-12 RX ADMIN — MULTIPLE VITAMINS W/ MINERALS TAB 1 TABLET: TAB at 08:07

## 2017-04-12 RX ADMIN — NICOTINE POLACRILEX 8 MG: 4 LOZENGE ORAL at 21:33

## 2017-04-12 RX ADMIN — NICOTINE POLACRILEX 8 MG: 4 LOZENGE ORAL at 16:14

## 2017-04-12 RX ADMIN — DIVALPROEX SODIUM 500 MG: 500 TABLET, DELAYED RELEASE ORAL at 21:33

## 2017-04-12 RX ADMIN — NICOTINE POLACRILEX 8 MG: 4 LOZENGE ORAL at 14:37

## 2017-04-12 RX ADMIN — DIAZEPAM 5 MG: 5 TABLET ORAL at 04:10

## 2017-04-12 RX ADMIN — QUETIAPINE 25 MG: 25 TABLET, FILM COATED ORAL at 12:18

## 2017-04-12 RX ADMIN — NICOTINE POLACRILEX 8 MG: 4 LOZENGE ORAL at 12:18

## 2017-04-12 RX ADMIN — HYDROXYZINE HYDROCHLORIDE 50 MG: 25 TABLET ORAL at 20:28

## 2017-04-12 RX ADMIN — NICOTINE POLACRILEX 8 MG: 4 LOZENGE ORAL at 10:14

## 2017-04-12 RX ADMIN — QUETIAPINE 50 MG: 25 TABLET, FILM COATED ORAL at 18:02

## 2017-04-12 RX ADMIN — MIRTAZAPINE 15 MG: 15 TABLET, FILM COATED ORAL at 21:33

## 2017-04-12 RX ADMIN — NICOTINE POLACRILEX 8 MG: 4 LOZENGE ORAL at 00:20

## 2017-04-12 RX ADMIN — Medication 100 MG: at 08:06

## 2017-04-12 RX ADMIN — GABAPENTIN 300 MG: 300 CAPSULE ORAL at 08:06

## 2017-04-12 RX ADMIN — DIVALPROEX SODIUM 500 MG: 500 TABLET, DELAYED RELEASE ORAL at 08:06

## 2017-04-12 RX ADMIN — DIAZEPAM 10 MG: 5 TABLET ORAL at 00:17

## 2017-04-12 RX ADMIN — DIAZEPAM 10 MG: 5 TABLET ORAL at 16:14

## 2017-04-12 ASSESSMENT — ACTIVITIES OF DAILY LIVING (ADL): GROOMING: INDEPENDENT

## 2017-04-12 NOTE — PROGRESS NOTES
"SPIRITUAL HEALTH SERVICES Progress Note  [facility] ([facility]) 3AW       DATA:    Initial meeting -- per patient request. Kai and I met in the dining area.  Kai self-identifies as a Congregation: \"I believe in the virgin birth; I believe in Braulio' crucifixion and death; I believe in Braulio' resurrection. No one can change my mind about those three things.\" ... and ... Kai also spoke to two additional truths of which he is certain: \"God exists and loves everyone; and, Jaguar is the father of lies!\"  Kai talked about his gaviota-story: \"I've always believed in God -- and, I know God believes in me!\" Kai has experienced the constant presence of God -- in both the good and difficult times of life (Kai mentioned his prayer/devotional life is what sustained his dignity/spirit while in [facility]). Kai is also certain of God's unconditional love and forgiveness: \"It doesn't matter what I think or how I behave or who I'm with -- I know God loves me.\"  Earlier, Kai had requested a \"Recovery Bible.\" While we were together, Kai read a long section from ANIA Presley, offering his personal interpretation/commentary for the citation. \"I LOVE Gustabo! He was a murderer who God turned into a real gaviota warrior.\"  Kai is very zealous about his gaviota -- but, he also can appreciate that not everyone feels/believes as he does: \"Braulio sat with lots of people who didn't realize he was the Son of God. I'm not going to  anyone.\"  Kai wants to discuss other sections of scripture and some dreams/visions he has experienced: \"Let's talk about that stuff tomorrow ....\"        INTERVENTION:    Reflective conversation; [facility] ([facility]) provided a \"Recovery Bible\" for Kai.       OUTCOME:    Kai articulated several gaviota stories; how God has been -- and, continues to be -- a constant encouragement in his life.       PLAN:    Kai and I made a plan to meet tomorrow/Wednesday, 12 April, to discuss scripture and how God speaks to him.      "                                                                                                                                    Ally Chong  [facility]   Pager [phone]

## 2017-04-12 NOTE — PROGRESS NOTES
At 18:45 this writer observed this patient and one of his peers in the dinning room arguing so staff walked over to see what was happening. The situation started to escalate so staff intervened and told the patients to separate and to drop it.  The patients had a hard time backing away from the situation but ultimately did. Both patients then came up to staff and apologized for their behavior.  Then at 20:00 the patient were in line to do vitals and started to arguing again. This writer observed this interaction as well and both patients became loud and argumentative. Staff again intervened and told them to drop what ever they were arguing about and that they need to stay  for the rest of the evening if they can't get along appropriately. Both patients took lots of redirecting but ultimately calmed down. Unfortunately staff does not know about that the patients were arguing about. This writer took both patients aside and talked to them about being appropriate and knowing when to walk away from an unhealthy situation.

## 2017-04-12 NOTE — PROGRESS NOTES
"Pt states feeling anxiety and states \"not so much\" for depression. He is highly verbal and slightly hyperactive. Denies any suicidal ideation plans or intent. Endorses hopefulness and happiness as well as loneliness, anxiety and depression. MSSA score today is a 9 mostly related to being hyperactive and his pulse rate. Since admission pt has now received 250 mg of po valium. Will ask Dr. Patel to consider placing him on a scheduled taper of valium regardless of his MSSA score.  "

## 2017-04-13 PROCEDURE — 99232 SBSQ HOSP IP/OBS MODERATE 35: CPT | Performed by: PSYCHIATRY & NEUROLOGY

## 2017-04-13 PROCEDURE — 12800008 ZZH R&B CD ADULT

## 2017-04-13 PROCEDURE — 25000132 ZZH RX MED GY IP 250 OP 250 PS 637: Performed by: PSYCHIATRY & NEUROLOGY

## 2017-04-13 RX ORDER — DIVALPROEX SODIUM 500 MG/1
1500 TABLET, DELAYED RELEASE ORAL AT BEDTIME
Status: DISCONTINUED | OUTPATIENT
Start: 2017-04-14 | End: 2017-04-14 | Stop reason: HOSPADM

## 2017-04-13 RX ORDER — DIVALPROEX SODIUM 500 MG/1
1000 TABLET, EXTENDED RELEASE ORAL ONCE
Status: COMPLETED | OUTPATIENT
Start: 2017-04-13 | End: 2017-04-13

## 2017-04-13 RX ADMIN — NICOTINE POLACRILEX 8 MG: 4 LOZENGE ORAL at 21:44

## 2017-04-13 RX ADMIN — MULTIPLE VITAMINS W/ MINERALS TAB 1 TABLET: TAB at 08:04

## 2017-04-13 RX ADMIN — NICOTINE POLACRILEX 8 MG: 4 LOZENGE ORAL at 04:10

## 2017-04-13 RX ADMIN — GABAPENTIN 300 MG: 300 CAPSULE ORAL at 20:06

## 2017-04-13 RX ADMIN — FOLIC ACID 1 MG: 1 TABLET ORAL at 08:05

## 2017-04-13 RX ADMIN — Medication 100 MG: at 08:04

## 2017-04-13 RX ADMIN — MIRTAZAPINE 15 MG: 15 TABLET, FILM COATED ORAL at 21:44

## 2017-04-13 RX ADMIN — QUETIAPINE 50 MG: 25 TABLET, FILM COATED ORAL at 10:33

## 2017-04-13 RX ADMIN — GABAPENTIN 300 MG: 300 CAPSULE ORAL at 14:07

## 2017-04-13 RX ADMIN — QUETIAPINE 50 MG: 25 TABLET, FILM COATED ORAL at 04:15

## 2017-04-13 RX ADMIN — DIVALPROEX SODIUM 1000 MG: 500 TABLET, EXTENDED RELEASE ORAL at 18:09

## 2017-04-13 RX ADMIN — DIAZEPAM 5 MG: 5 TABLET ORAL at 08:04

## 2017-04-13 RX ADMIN — LURASIDONE HYDROCHLORIDE 20 MG: 20 TABLET, FILM COATED ORAL at 20:06

## 2017-04-13 RX ADMIN — GABAPENTIN 300 MG: 300 CAPSULE ORAL at 08:05

## 2017-04-13 RX ADMIN — NICOTINE POLACRILEX 8 MG: 4 LOZENGE ORAL at 14:06

## 2017-04-13 RX ADMIN — NICOTINE POLACRILEX 8 MG: 4 LOZENGE ORAL at 08:06

## 2017-04-13 RX ADMIN — LURASIDONE HYDROCHLORIDE 20 MG: 20 TABLET, FILM COATED ORAL at 08:04

## 2017-04-13 RX ADMIN — DIVALPROEX SODIUM 500 MG: 500 TABLET, DELAYED RELEASE ORAL at 06:05

## 2017-04-13 RX ADMIN — NICOTINE POLACRILEX 8 MG: 4 LOZENGE ORAL at 16:04

## 2017-04-13 RX ADMIN — NICOTINE POLACRILEX 8 MG: 4 LOZENGE ORAL at 19:06

## 2017-04-13 RX ADMIN — NICOTINE POLACRILEX 8 MG: 4 LOZENGE ORAL at 00:12

## 2017-04-13 RX ADMIN — DIAZEPAM 5 MG: 5 TABLET ORAL at 00:12

## 2017-04-13 RX ADMIN — QUETIAPINE 50 MG: 25 TABLET, FILM COATED ORAL at 16:04

## 2017-04-13 RX ADMIN — NICOTINE POLACRILEX 8 MG: 4 LOZENGE ORAL at 20:06

## 2017-04-13 RX ADMIN — NICOTINE POLACRILEX 8 MG: 4 LOZENGE ORAL at 06:17

## 2017-04-13 RX ADMIN — NICOTINE POLACRILEX 8 MG: 4 LOZENGE ORAL at 18:08

## 2017-04-13 RX ADMIN — NICOTINE POLACRILEX 8 MG: 4 LOZENGE ORAL at 12:37

## 2017-04-13 RX ADMIN — NICOTINE POLACRILEX 8 MG: 4 LOZENGE ORAL at 10:33

## 2017-04-13 ASSESSMENT — ACTIVITIES OF DAILY LIVING (ADL)
LAUNDRY: WITH SUPERVISION
DRESS: INDEPENDENT
GROOMING: INDEPENDENT
ORAL_HYGIENE: INDEPENDENT

## 2017-04-13 NOTE — PROGRESS NOTES
"[facility], [place]   Psychiatric Progress Note    Interim history   This is a 41 year old male with alcohol dependence, bipolar affective dx.Pt seen in rounds. Patient's mood is good  In 2003-2209 he was in intermediate ,during that time  he was sober for the most part and he was feeling happy, he would talk fast, hears something , would go days  Without sleep and still have the energy.He would feel \"people are talking about me and whispering about me,\"  That time he would feel at \"People are out of get me\"    Energy Level:lot of energy   Sleep:poor   Appetite:normal motivation interest good   deneid any Suicidal/homicidal ideation/plan intent.  + psychosis\"people are talking about me\" at times, not present during the interview today   No prior suicde attempts  No access to gun  Pt is in detox  Pt mssa/cows score are monitered  Tolerating meds and has no side effects.              Medications:     Current Facility-Administered Medications   Medication     [START ON 4/14/2017] divalproex (DEPAKOTE) EC tablet 1,500 mg     divalproex (DEPAKOTE ER) 24 hr tablet 1,000 mg     lurasidone (LATUDA) tablet 20 mg     thiamine tablet 100 mg     QUEtiapine (SEROquel) tablet 25-50 mg     loperamide (IMODIUM) capsule 2 mg     gabapentin (NEURONTIN) capsule 300 mg     mirtazapine (REMERON) tablet 15 mg     atenolol (TENORMIN) tablet 50 mg     folic acid (FOLVITE) tablet 1 mg     multivitamin, therapeutic with minerals (THERA-VIT-M) tablet 1 tablet     hydrOXYzine (ATARAX) tablet 25-50 mg     acetaminophen (TYLENOL) tablet 650 mg     alum & mag hydroxide-simethicone (MYLANTA ES/MAALOX  ES) suspension 30 mL     magnesium hydroxide (MILK OF MAGNESIA) suspension 30 mL     bisacodyl (DULCOLAX) Suppository 10 mg     nicotine polacrilex lozenge 4-8 mg             Allergies:   No Known Allergies         Psychiatric Examination:   Blood pressure 117/86, pulse 103, temperature 97.4  F (36.3  C), temperature source " "Tympanic, resp. rate 16, height 1.702 m (5' 7\"), weight 60.8 kg (134 lb), SpO2 98 %.  Weight is 134 lbs 0 oz  Body mass index is 20.99 kg/(m^2).    Appearance:  awake, alert and adequately groomed  Attitude:  cooperative  Eye Contact:  good  Mood:  better  Affect:  appropriate and in normal range and mood congruent  Speech:  clear, coherent rate /rhythm are good  Psychomotor Behavior:  no evidence of tardive dyskinesia, dystonia, or tics and intact station, gait and muscle tone  Throught Process:  logical  Associations:  no loose associations  Thought Content:  no evidence of suicidal ideation or homicidal ideation, no evidence of psychotic thought, no auditory hallucinations present and no visual hallucinations present  Insight:  limited  Judgement:  intact  Oriented to:  time, person, and place  Attention Span and Concentration:  intact  Recent and Remote Memory:  intact  Language fund of knowledge are adequate         Labs:     No results found for: NTBNPI, NTBNP  Lab Results   Component Value Date    WBC 5.4 04/08/2017    HGB 15.2 04/08/2017    HCT 44.5 04/08/2017    MCV 90 04/08/2017     04/08/2017     Lab Results   Component Value Date    TSH 0.27 (L) 03/22/2012          Dx   Alcohol use dx  severe  bipolar affective dx most recent depressed with psychosis    Plan  Will contiue to detox of mssa on valium   will increase  depakote 1500 mg po bid,66mg level  Will increase   latuda 20 mg po bd for paranoia  Will add seroquel 25-50 qid prn for pyschosis    As per medine  \"Note patient has mildly elevated cholesterol at 214, LDL 74, HDL of 119, normal TG.   -Would not treat given elevated transaminases, recent alcohol abuse and statin being medication of choice  -Needs PCP f/u within 3 months for repeat LFTs and repeat cholesterol panel     Mixed issues and story with urinary frequency yesterday, possible difficulty initiating stream today. Will check UA and monitor for now. Can bladder scan PRN if unable " "to void, or check post void residuals if feels incomplete emptying is a factor. Will order bladder scan PRN and discuss with nursing. \"       Will keep seroquel prn and taper off it gradually   Laboratory/Imaging: reviewed with patient   Consults: internal medicine consult reviewed  Patient will be treated in therapeutic milieu with appropriate individual and group therapies as described.          Legal Status: voluntary    Safety Assessment:   Checks:  15 min  Precautions: withdrawal precautions  Pt has not required locked seclusion or restraints in the past 24 hours to maintain safety, please refer to RN documentation for further details.    Able to give informed consent:  YES   Discussed Risks/Benefits/Side Effects/Alternatives: YES    After discussion of the indications, risks, benefits, alternatives and consequences of no treatment, the patient elects to complete detox and do trt.  "

## 2017-04-14 ENCOUNTER — TELEPHONE (OUTPATIENT)
Dept: BEHAVIORAL HEALTH | Facility: CLINIC | Age: 42
End: 2017-04-14

## 2017-04-14 VITALS
OXYGEN SATURATION: 98 % | RESPIRATION RATE: 16 BRPM | WEIGHT: 134 LBS | DIASTOLIC BLOOD PRESSURE: 76 MMHG | HEIGHT: 67 IN | SYSTOLIC BLOOD PRESSURE: 143 MMHG | BODY MASS INDEX: 21.03 KG/M2 | TEMPERATURE: 97.3 F | HEART RATE: 87 BPM

## 2017-04-14 PROCEDURE — 99239 HOSP IP/OBS DSCHRG MGMT >30: CPT | Performed by: PSYCHIATRY & NEUROLOGY

## 2017-04-14 PROCEDURE — 25000132 ZZH RX MED GY IP 250 OP 250 PS 637: Performed by: PSYCHIATRY & NEUROLOGY

## 2017-04-14 RX ORDER — DIVALPROEX SODIUM 500 MG/1
1500 TABLET, DELAYED RELEASE ORAL AT BEDTIME
Qty: 90 TABLET | Refills: 0 | Status: SHIPPED | OUTPATIENT
Start: 2017-04-14 | End: 2023-09-24

## 2017-04-14 RX ORDER — MIRTAZAPINE 15 MG/1
15 TABLET, FILM COATED ORAL AT BEDTIME
Qty: 30 TABLET | Refills: 0 | Status: SHIPPED | OUTPATIENT
Start: 2017-04-14 | End: 2023-09-24

## 2017-04-14 RX ORDER — QUETIAPINE FUMARATE 25 MG/1
25-50 TABLET, FILM COATED ORAL 4 TIMES DAILY PRN
Qty: 60 TABLET | Refills: 1 | Status: SHIPPED | OUTPATIENT
Start: 2017-04-14 | End: 2017-04-14

## 2017-04-14 RX ORDER — LURASIDONE HYDROCHLORIDE 20 MG/1
20 TABLET, FILM COATED ORAL 2 TIMES DAILY
Qty: 60 TABLET | Refills: 0 | Status: SHIPPED | OUTPATIENT
Start: 2017-04-14 | End: 2023-09-24

## 2017-04-14 RX ORDER — MULTIPLE VITAMINS W/ MINERALS TAB 9MG-400MCG
1 TAB ORAL DAILY
Qty: 30 EACH | Refills: 0 | Status: SHIPPED | OUTPATIENT
Start: 2017-04-14

## 2017-04-14 RX ORDER — LANOLIN ALCOHOL/MO/W.PET/CERES
100 CREAM (GRAM) TOPICAL DAILY
Qty: 30 TABLET | Refills: 0 | Status: SHIPPED | OUTPATIENT
Start: 2017-04-14

## 2017-04-14 RX ORDER — FOLIC ACID 1 MG/1
1 TABLET ORAL DAILY
Qty: 30 TABLET | Refills: 0 | Status: SHIPPED | OUTPATIENT
Start: 2017-04-14 | End: 2023-09-24

## 2017-04-14 RX ORDER — GABAPENTIN 300 MG/1
300 CAPSULE ORAL 3 TIMES DAILY
Qty: 90 CAPSULE | Refills: 0 | Status: SHIPPED | OUTPATIENT
Start: 2017-04-14 | End: 2017-04-14

## 2017-04-14 RX ADMIN — NICOTINE POLACRILEX 8 MG: 4 LOZENGE ORAL at 04:12

## 2017-04-14 RX ADMIN — Medication 100 MG: at 08:00

## 2017-04-14 RX ADMIN — LURASIDONE HYDROCHLORIDE 20 MG: 20 TABLET, FILM COATED ORAL at 08:00

## 2017-04-14 RX ADMIN — QUETIAPINE 50 MG: 25 TABLET, FILM COATED ORAL at 10:14

## 2017-04-14 RX ADMIN — FOLIC ACID 1 MG: 1 TABLET ORAL at 08:00

## 2017-04-14 RX ADMIN — GABAPENTIN 300 MG: 300 CAPSULE ORAL at 08:00

## 2017-04-14 RX ADMIN — NICOTINE POLACRILEX 8 MG: 4 LOZENGE ORAL at 12:22

## 2017-04-14 RX ADMIN — NICOTINE POLACRILEX 8 MG: 4 LOZENGE ORAL at 08:00

## 2017-04-14 RX ADMIN — NICOTINE POLACRILEX 8 MG: 4 LOZENGE ORAL at 10:14

## 2017-04-14 RX ADMIN — NICOTINE POLACRILEX 8 MG: 4 LOZENGE ORAL at 09:02

## 2017-04-14 RX ADMIN — MULTIPLE VITAMINS W/ MINERALS TAB 1 TABLET: TAB at 08:00

## 2017-04-14 ASSESSMENT — ACTIVITIES OF DAILY LIVING (ADL)
DRESS: INDEPENDENT
GROOMING: INDEPENDENT
LAUNDRY: WITH SUPERVISION
ORAL_HYGIENE: INDEPENDENT

## 2017-04-14 NOTE — PROGRESS NOTES
"Writer received call from patient's physician who states that staff from [facility] are concerned that patient is \"psychotic\" and \"completely unstable\". Writer observed this patient closely throghout the day and observed that patient presents as anxious, but otherwise stable and not showing any signs of psychosis at all. Patient was cooperative, pleasant and able to engage in conversation and track conversation. Patient denied any signs of psychosis or self-harm. Patient and writer discussed methods of coping with anxiety before he discharged, such as deep breathing. Patient stated to writer \"I am always a little anxious, this is normal for me\". Writer consulted with peer RN on the unit and she agreed that patient did not seem at all psychotic.   "

## 2017-04-14 NOTE — PLAN OF CARE
"Problem: Substance Withdrawal  Goal: Substance Withdrawal  Problem: General Plan of Care (Inpatient Behavioral)  Goal: Individualization/ Patient Specific Goal (IP Behavioral)  The patient and/or their representative their patient-specific goals related to the plan of care.  Patient specific goals include:  1. Patient will be evaluated by a physician and labs will be evaluated.  2. Patient will be seen by a  for evaluation and discharge planning.  3. Patient will complete assessment paperwork  4. Patient will consume 75 % of meal to meet estimated energy needs.  5. Detoxification from alcohol using valium.  6. Patient will be provided with alcohol relapse prevention information.Signs and symptoms of listed problems will be absent or manageable.   Outcome: Completed Date Met:  04/14/17  [facility] from  at 1300. Patient denies SI/SIB, states he is very motivated to stay sober and credits \"my higher power\" with providing him this motivation. All belongings and medications [facility] to [facility] per protocol. Writer and patient discussed medications and discharge instructions, and patient denied having any concerns. Patient is overall feeling positive. Patient was advised to see a physician regarding elevated LFTs and BP. Lab results given and explained to patient.       "

## 2017-04-14 NOTE — TELEPHONE ENCOUNTER
S: Dr. Patel called to notify of a pt coming from [facility] to the ED  B: Dr. Shen met with pt today and discharged him from [address] [place] [address] to [facility]. Dr. Patel states that the pt was totally fine at that time and appropriate for DC. The [facility] staff called to notify Jorge that the pt had decompensated and appeared manic. Dr. Patel isn't sure what happened as she stated the patient was totally stable when se met with the patient.   A: Pt needing MH evaluation  R: Staff are going to walk pt to the emergency room for evaluation.

## 2017-04-14 NOTE — PROGRESS NOTES
"SPIRITUAL HEALTH SERVICES Progress Note  [facility] ([facility] [facility]       DATA:    Follow-up visit; Kai and I met in the dining area after the Thursday afternoon spirituality group [we were not able to meet on Wednesday evening, as had been originally arranged].  I had some art images (representing \"hope\" and/or \"transformation\") that were used in spirituality group. Kai curated the remaining images; his selections presented the opportunity for Kai to talk about a variety of spiritual experiences (i.e., reoccurring dreams/visions of his life and/or spiritual encounters). The most specific/detailed account Kai offered included two experiences from August 2015:  + he felt he was being picked-up -- out of a sound sleep -- brought to his feet; he moved from his apartment to the outdoors; he recalls that he was not wearing shoes or a shirt.   + Kai experienced movement -- and, on either side of him were people in light, wearing/emitting bright colors; others were shadow-figures, obscured by darkness. Both the \"light\" and \"dark\" figures were cheering as though at a parade or sporting event.   + Kai recalls being drawn to the lighted figures; they were gentle/welcoming; they cheered him-on.  + Kai reflected that he felt such love for God that he started running [outdoors, in bare-feet] -- leaping, shouting/praising/professing God.  + The next thing he remembered was waking-up in the hospital.  The other dream was similar -- but, a bit more sedate.   + Kai was home; he was sleeping on the couch with a cap/hat over his eyes.   + Even though his eyes were covered, there was great light and color that woke him.  + Faceless, yet colten figures were showing him aspects of his life -- including scenes of his love of music (i.e., guitar, piano, drums).  In both cases, Kai experienced the presence of God in his life. These dreams/visions were so profound that Kai is assured/secure in the accompaniment, love, and unconditional " "forgiveness of God in every aspect of his life. These dreams/visions are what causes Kai to be so zealous in his gaviota -- i.e., belief, speech, dreams, experiences, etc.  Kai and I discussed the importance of attending to God's guidance; i.e., to be discerning about what is truly of God -- and, what is our own understanding. Kai endorsed this line of conversation and applied it to his desire to be non-judgmental, tender-hearted, courteous, etc.  The scheduled afternoon reflection-time ana Kai's and my conversation to a close.       INTERVENTION:    Reflective conversation; provided 5-6-printed page images of \"hope\"/\"transformation\"; brief spoken prayer; spoken blessing upon leave-taking.       OUTCOME:    Kai spoke freely of his ecstatic experiences of God in his gaviota and life.        PLAN:    [facility] remains available to Kai.                                                                                                                                              Ally Chong  [facility]    Pager [phone]    "

## 2017-04-15 ENCOUNTER — HOSPITAL ENCOUNTER (OUTPATIENT)
Dept: BEHAVIORAL HEALTH | Facility: CLINIC | Age: 42
End: 2017-04-15
Attending: PSYCHIATRY & NEUROLOGY
Payer: COMMERCIAL

## 2017-04-15 PROCEDURE — H2035 A/D TX PROGRAM, PER HOUR: HCPCS | Mod: HQ

## 2017-04-15 PROCEDURE — 10020000 ZZH LODGING PLUS FACILITY CHARGE ADULT

## 2017-04-15 NOTE — DISCHARGE SUMMARY
More than 35 minutes spent on discharge summary, doing the discharge instructions, discharge medications, discharge mental status examination.      DISCHARGE DIAGNOSES:   Axis I:   1.  Major depressive disorder. recuurent with psyhcosis  2.  Alcohol dependence.   r/o  Bipolar affective disorder.     r.      IDENTIFYING INFORMATION:  Kai Sims is a 41-year-old  male admitted for detox.  Please review admission note by Dr. Allison on 2017.      HOSPITAL COURSE:  During the hospitalization, the patient was detoxed off alcohol using [facility] protocol on Valium.  He had a prolonged but uneventful detox.  He was put back on Depakote 1500 mg at bedtime.  He wanted to try Latuda.  He was on Latuda  20 mg po qdand gabapentin 300 mg 3 times a day.  Latuda was increased to 20 mg a day, Remeron 15 mg.   During the hospitalization, the patient had lab work done, which was normal comprehensive metabolic panel.  CBC, CMP normal.  During the hospitalization, the patient saw Internal Medicine consult.  Please review detailed note by medicineon 2017.  Patient was seen again by medicine for elevated cholesterol.  Patient had mildly elevated cholesterol.  Because of his recent drinking, they thought it was because of his drinking, they were asking to repeat it than start medication .  During the hospitalization, the patient's energy, motivation, sleep and interest improved.  He did not have any suicidal or homicidal ideation, plan or intent.   He did not have any psychosis, he  have any s/o of diamante, depression. His mood was stable, feeling motivated to do trt.     DISCHARGE DISPOSITION:  The patient going to [facility].      DISCHARGE MENTAL STATUS EXAMINATION:  The patient is alert, oriented x3.  Recent and remote memory, language, fund of knowledge are all adequate.      DISCHARGE MEDICATIONS:  Include:   1.  Depakote 1500 mg at bedtime.   2.  Gabapentin 300 mg twice a day.   3.  Remeron 30 mg.   4.   Multivitamin.   5.  Latuda 20 mg a day.   6.  Seroquel 25-50 mg.        The patient will follow up with Dr. Patel in 1 week.       Bennett Sims BLANQUITA   Home Medication Instructions CAROLYN:[id number]    Printed on:04/21/17 1014   Medication Information                      divalproex (DEPAKOTE) 500 MG EC tablet  Take 3 tablets (1,500 mg) by mouth At Bedtime             folic acid (FOLVITE) 1 MG tablet  Take 1 tablet (1 mg) by mouth daily             lurasidone (LATUDA) 20 MG TABS tablet  Take 1 tablet (20 mg) by mouth 2 times daily             mirtazapine (REMERON) 15 MG tablet  Take 1 tablet (15 mg) by mouth At Bedtime             multivitamin, therapeutic with minerals (THERA-VIT-M) TABS tablet  Take 1 tablet by mouth daily             nicotine polacrilex 4 MG lozenge  Place 1-2 lozenges (4-8 mg) inside cheek every hour as needed for other (nicotine withdrawal symptoms)             thiamine 100 MG tablet  Take 1 tablet (100 mg) by mouth daily             DISCHARGE MENTAL STATUS EXAMINATION:  The patient is alert, oriented x3.  Good fund of knowledge.  Good use of language.  Recent and remote memory, language, fund of knowledge are all adequate.  Euthymic mood congruent affect  Speech normal rate/rhythm linear tp no loose asso,The patient does not have any active suicidal or homicidal ideation.  Does not have any auditory or visual hallucination.  Fair insight/judgment At this time, the patient was stable to be discharged.         Educated about side effects/risk vs benefits /alternative including non treatment.Pt consented to be on medication.     .Total time spent on discharge summary more than 35 min  More than  20 min  planning, coordination of care, medication reconciliation and performance of physical exam on day of discharge.Care was coordinated with unit RN and unit therapist         NICOLE PATEL MD             D: 04/14/2017 13:01   T: 04/15/2017 10:47   MT: DWAYNE      Name:     LAILABENNETT   MRN:       [id number]        Account:        [id number]   :      1975           Admit Date:     [id number]                                  Discharge Date: 2017      Document: [id number]

## 2017-04-16 ENCOUNTER — HOSPITAL ENCOUNTER (OUTPATIENT)
Dept: BEHAVIORAL HEALTH | Facility: CLINIC | Age: 42
End: 2017-04-16
Attending: PSYCHIATRY & NEUROLOGY
Payer: COMMERCIAL

## 2017-04-16 PROCEDURE — 10020000 ZZH LODGING PLUS FACILITY CHARGE ADULT

## 2017-04-16 PROCEDURE — H2035 A/D TX PROGRAM, PER HOUR: HCPCS | Mod: HQ

## 2017-04-16 PROCEDURE — H2035 A/D TX PROGRAM, PER HOUR: HCPCS

## 2017-04-17 ENCOUNTER — HOSPITAL ENCOUNTER (OUTPATIENT)
Dept: BEHAVIORAL HEALTH | Facility: CLINIC | Age: 42
End: 2017-04-17
Attending: PSYCHIATRY & NEUROLOGY
Payer: COMMERCIAL

## 2017-04-17 PROCEDURE — 10020000 ZZH LODGING PLUS FACILITY CHARGE ADULT

## 2017-04-17 PROCEDURE — 99214 OFFICE O/P EST MOD 30 MIN: CPT | Performed by: PSYCHIATRY & NEUROLOGY

## 2017-04-17 PROCEDURE — H2035 A/D TX PROGRAM, PER HOUR: HCPCS | Mod: HQ

## 2017-04-17 NOTE — PROGRESS NOTES
Interim history   This is a 41 year old male with bipolar affective dx, alcohol dependence.Pt seen . Patient's mood is good   Pt  Has mood swings but ;less,some racing thughts   Energy Level:better   Sleep:poor   Appetite:good  motivation interest are good   Denied any Suicidal/homicidal ideation/plan intent. Denied psychosis    Tolerating meds and has no side effects.         No prior suicide attempts      Past Medical History:   Diagnosis Date     Bipolar affective disorder (H)      Substance abuse        10 point ROS is negative   constitutional    HEENT - no symptoms   RESPIRATORY-no symptoms   CVS-no symptoms   GI-no symptoms  MUSCKULOSKELETAL -no symptoms  NEUROLOGICAL-no symptoms  ENDOCRINE-no symptoms  HEMATAOLOGY-no symptoms  SKIN-no symptoms  Family History   Problem Relation Age of Onset     Substance Abuse Father      Substance Abuse Sister      Social History     Social History     Marital status: Single     Spouse name: N/A     Number of children: N/A     Years of education: N/A     Occupational History     Not on file.     Social History Main Topics     Smoking status: Current Every Day Smoker     Packs/day: 1.00     Smokeless tobacco: Not on file     Alcohol use Yes      Comment: 1L vodka daily     Drug use: Yes     Special: Marijuana, Methamphetamines      Comment: Clean for 15 days     Sexual activity: Not on file     Other Topics Concern     Not on file     Social History Narrative            Medications:     Current Outpatient Prescriptions   Medication Sig     divalproex (DEPAKOTE) 500 MG EC tablet Take 3 tablets (1,500 mg) by mouth At Bedtime     mirtazapine (REMERON) 15 MG tablet Take 1 tablet (15 mg) by mouth At Bedtime     lurasidone (LATUDA) 20 MG TABS tablet Take 1 tablet (20 mg) by mouth 2 times daily     folic acid (FOLVITE) 1 MG tablet Take 1 tablet (1 mg) by mouth daily     nicotine polacrilex 4 MG lozenge Place 1-2 lozenges (4-8 mg) inside cheek every hour as needed  for other (nicotine withdrawal symptoms)     multivitamin, therapeutic with minerals (THERA-VIT-M) TABS tablet Take 1 tablet by mouth daily     thiamine 100 MG tablet Take 1 tablet (100 mg) by mouth daily     alum & mag hydroxide-simethicone (MYLANTA/MAALOX) 200-200-20 MG/5ML SUSP suspension Take 30 mLs by mouth every 6 hours as needed for indigestion     guaiFENesin (ROBITUSSIN) 20 mg/mL SOLN solution Take 10 mLs by mouth every 4 hours as needed for cough     phenol-menthol (CEPASTAT) 14.5 MG lozenge Place 1 lozenge inside cheek every 2 hours as needed for moderate pain     loratadine (CLARITIN) 10 MG tablet Take 10 mg by mouth daily as needed for allergies     senna-docusate (SENOKOT-S;PERICOLACE) 8.6-50 MG per tablet Take 2 tablets by mouth nightly as needed for constipation     IBUPROFEN PO Take 200-400 mg by mouth every 6 hours as needed for moderate pain     gabapentin (NEURONTIN) 300 MG capsule Take 1 capsule (300 mg) by mouth 4 times daily     QUEtiapine (SEROQUEL) 100 MG tablet Take 0.5-1 tablets ( mg) by mouth 4 times daily as needed (Anxiety, Agitation)     No current facility-administered medications for this encounter.      Facility-Administered Medications Ordered in Other Encounters   Medication     Self Administer Medications: Behavioral Services        Current Outpatient Prescriptions on File Prior to Encounter:  divalproex (DEPAKOTE) 500 MG EC tablet Take 3 tablets (1,500 mg) by mouth At Bedtime   mirtazapine (REMERON) 15 MG tablet Take 1 tablet (15 mg) by mouth At Bedtime   lurasidone (LATUDA) 20 MG TABS tablet Take 1 tablet (20 mg) by mouth 2 times daily   folic acid (FOLVITE) 1 MG tablet Take 1 tablet (1 mg) by mouth daily   nicotine polacrilex 4 MG lozenge Place 1-2 lozenges (4-8 mg) inside cheek every hour as needed for other (nicotine withdrawal symptoms)   multivitamin, therapeutic with minerals (THERA-VIT-M) TABS tablet Take 1 tablet by mouth daily   thiamine 100 MG tablet Take 1  tablet (100 mg) by mouth daily   alum & mag hydroxide-simethicone (MYLANTA/MAALOX) 200-200-20 MG/5ML SUSP suspension Take 30 mLs by mouth every 6 hours as needed for indigestion   guaiFENesin (ROBITUSSIN) 20 mg/mL SOLN solution Take 10 mLs by mouth every 4 hours as needed for cough   phenol-menthol (CEPASTAT) 14.5 MG lozenge Place 1 lozenge inside cheek every 2 hours as needed for moderate pain   loratadine (CLARITIN) 10 MG tablet Take 10 mg by mouth daily as needed for allergies   senna-docusate (SENOKOT-S;PERICOLACE) 8.6-50 MG per tablet Take 2 tablets by mouth nightly as needed for constipation   IBUPROFEN PO Take 200-400 mg by mouth every 6 hours as needed for moderate pain   gabapentin (NEURONTIN) 300 MG capsule Take 1 capsule (300 mg) by mouth 4 times daily   QUEtiapine (SEROQUEL) 100 MG tablet Take 0.5-1 tablets ( mg) by mouth 4 times daily as needed (Anxiety, Agitation)     Current Facility-Administered Medications on File Prior to Encounter:  Self Administer Medications: Behavioral Services          Allergies:   No Known Allergies         Psychiatric Examination:     Weight is 0 lbs 0 oz  There is no height or weight on file to calculate BMI.    Appearance:  awake, alert and adequately groomed  Attitude:  cooperative  Eye Contact:  good  Mood:  anxious  Affect:  appropriate and in normal range and mood congruent  Speech:  clear, coherent rate /rhythm are good  Psychomotor Behavior:  no evidence of tardive dyskinesia, dystonia, or tics and intact station, gait and muscle tone  Throught Process:  logical  Associations:  no loose associations  Thought Content:  no evidence of suicidal ideation or homicidal ideation, no evidence of psychotic thought, no auditory hallucinations present and no visual hallucinations present  Insight:  good  Judgement:  intact  Oriented to:  time, person, and place  Attention Span and Concentration:  intact  Recent and Remote Memory:  intact  Language fund of knowledge are  adequate               Labs:     No results found for: NTBNPI, NTBNP  Lab Results   Component Value Date    WBC 5.4 04/08/2017    HGB 15.2 04/08/2017    HCT 44.5 04/08/2017    MCV 90 04/08/2017     04/08/2017     Lab Results   Component Value Date    TSH 0.27 (L) 03/22/2012           DIagnoses:     bipolar affective dx,most recent epsiode hypomanic    alcohol dependence       Plan:     Will add deakote 500 mg po qam  Will increase seroqule 200 mg po qhs   latuda is not covered , applied for prior authrization  Continue present meds  F/u as per counselours    Able to give informed consent:  YES       Discussed Risks/Benefits/Side Effects/Alternatives: YES      Discussed with patient many issues of addiction,triggers/ relapse, and establishing a solid recovery program.

## 2017-04-18 NOTE — PROGRESS NOTES
DISCHARGE SUMMARY      EVALUATION COUNSELOR:  Yarelis Hdez.   TREATMENT COUNSELORS:  HIRAM Quintanilla, [facility] and Marcell GANDHI, [facility].   REFERRAL SOURCE:  [address] [place] [address] [facility] Detox Unit.   PROGRAM:  [facility], [facility].   ADMISSION DATE:  04/14/2017.   LAST SESSION DATE:  04/17/2017.   ADMISSION DIAGNOSES:     1.  Alcohol use disorder, severe, F10.20.   2.  Methamphetamine use disorder, severe, F15.20.   DISCHARGE DIAGNOSES:     1.  Alcohol use disorder, severe, F10.20.   2.  Methamphetamine disorder, F15.20.   DISCHARGE STATUS:  Kai Sims left program against medical advice.   LAST USE DATE:  As client reported, 04/05/2017.   DAYS OF TREATMENT COMPLETED:  [facility] days.      PRESENTING INFORMATION:  The patient was admitted to [address] [place] [address] at [facility] for detox for alcohol and meth.  Patient has a long history of alcohol dependency, bipolar disorder and polysubstance dependency.  The patient stated that he had been drinking vodka up to a liter per day as well as  meth use.  The patient had not been taking his meds for his mental health issues which are extensive.  The patient seemed to stabilize per  staff where his chemical dependency evaluation and assessment was completed.  The patient had been in 30+ treatments and 30+ detoxifications as well has been incarcerated for 10+ years in halfway.  The patient was admitted to the [facility].      SERVICES PROVIDED:  Services included assessment, treatment planning and group therapy.  The patient saw the staff psychiatrist in the 3 days that he was here and she did change his medications.      ISSUES ADDRESSED IN TREATMENT:      DIMENSION 1:  ACUTE INTOXICATION AND WITHDRAWAL:  Admission risk factor 0.  Discharge risk factor 0.  The patient per nursing staff had no cravings since he was successfully medically detoxed on [address] [place] [address].        DIMENSION 2:   BIOMEDICAL CONDITIONS:  Admission risk factor 0.  Discharge risk factor 0.  The patient had no issues in this area.      DIMENSION 3:  EMOTIONAL AND BEHAVIORAL:  Admission risk factor 2.  Discharge risk factor 3.  The patient was totally inappropriate for this program.  The patient had many mental health issues that were left undetected on the [address].  The patient struggled with the interaction of a large institution of 40+ patients.  The patient did not complete any assignments in his treatment plan since he was only here 3 days.  The patient attended some groups.      DIMENSION 4:  READINESS TO CHANGE:  Admission risk factor 2.  Discharge risk factor 4.  The patient lacked internal motivation to stop using.  The patient had had 30+ treatments in the past.  The patient had little motivation to change his lifestyle.  The patient left AMA after 3 days.      DIMENSION 5:  RELAPSE AND CONTINUED USE POTENTIAL:  Admission risk factor 4.  Discharge risk factor 4.  The patient completed no assignments in this area.  The patient at high risk to relapse.      DIMENSION 6:  RECOVERY ENVIRONMENT:  Admission risk factor 4.  Discharge risk factor 4.  The patient left AMA after 3 days.      STRENGTHS:  The patient showed up for group.      PROGNOSIS:  Unfavorable.      LIVING ARRANGEMENTS AT DISCHARGE:  The patient left AMA.      CONTINUING CARE RECOMMENDATIONS AND REFERRALS:   1.  The patient should never be admitted to the [facility] again.  The patient's mental health issues far exceed his chemical dependency at this point.  The patient is active with  in numerous counties.  The patient has housing advocates.  The patient is hooked into the system if he so chooses to use it.   2.  The patient needs to abstain from all mood-altering chemicals.   3.  The patient should seek sober housing and long-term mental health programming.         This information has been disclosed to you from records protected by  Federal confidentiality rules (42 CFR part 2). The Federal rules prohibit you from making any further disclosure of this information unless further disclosure is expressly permitted by the written consent of the person to whom it pertains or as otherwise permitted by 42 CFR part 2. A general authorization for the release of medical or other information is NOT sufficient for this purpose. The Federal rules restrict any use of the information to criminally investigate or prosecute any alcohol or drug abuse patient.      HIRAM STUART, [facility]             D: 2017 07:24   T: 2017 09:08   MT: SK      Name:     BENNETT ULLOA   MRN:      [id number]        Account:      [id number]   :      1975           Visit Date:   2017      Document: [id number]

## 2017-06-16 ENCOUNTER — APPOINTMENT (OUTPATIENT)
Dept: GENERAL RADIOLOGY | Facility: CLINIC | Age: 42
End: 2017-06-16
Attending: EMERGENCY MEDICINE
Payer: COMMERCIAL

## 2017-06-16 ENCOUNTER — HOSPITAL ENCOUNTER (EMERGENCY)
Facility: CLINIC | Age: 42
Discharge: HOME | End: 2017-06-16
Attending: EMERGENCY MEDICINE | Admitting: EMERGENCY MEDICINE
Payer: COMMERCIAL

## 2017-06-16 VITALS
HEART RATE: 85 BPM | OXYGEN SATURATION: 98 % | DIASTOLIC BLOOD PRESSURE: 72 MMHG | SYSTOLIC BLOOD PRESSURE: 128 MMHG | TEMPERATURE: 99.3 F | WEIGHT: 150 LBS | BODY MASS INDEX: 23.49 KG/M2 | RESPIRATION RATE: 18 BRPM

## 2017-06-16 DIAGNOSIS — T73.3XXA OVEREXERTION FROM PROLONGED STATIC POSITION: ICD-10-CM
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
## 2017-06-16 DIAGNOSIS — S93.401A SPRAIN OF RIGHT ANKLE, UNSPECIFIED LIGAMENT, INITIAL ENCOUNTER: ICD-10-CM

## 2017-06-16 PROCEDURE — 73610 X-RAY EXAM OF ANKLE: CPT | Mod: RT

## 2017-06-16 PROCEDURE — 99284 EMERGENCY DEPT VISIT MOD MDM: CPT

## 2017-06-16 PROCEDURE — 25000132 ZZH RX MED GY IP 250 OP 250 PS 637: Performed by: EMERGENCY MEDICINE

## 2017-06-16 PROCEDURE — 99284 EMERGENCY DEPT VISIT MOD MDM: CPT | Mod: Z6 | Performed by: EMERGENCY MEDICINE

## 2017-06-16 RX ORDER — NAPROXEN 500 MG/1
500 TABLET ORAL 2 TIMES DAILY WITH MEALS
Qty: 16 TABLET | Refills: 0 | Status: SHIPPED | OUTPATIENT
Start: 2017-06-16 | End: 2017-06-24

## 2017-06-16 RX ORDER — NAPROXEN 500 MG/1
500 TABLET ORAL ONCE
Status: COMPLETED | OUTPATIENT
Start: 2017-06-16 | End: 2017-06-16

## 2017-06-16 RX ORDER — OXYCODONE HYDROCHLORIDE 5 MG/1
5 TABLET ORAL ONCE
Status: COMPLETED | OUTPATIENT
Start: 2017-06-16 | End: 2017-06-16

## 2017-06-16 RX ADMIN — OXYCODONE HYDROCHLORIDE 5 MG: 5 TABLET ORAL at 19:50

## 2017-06-16 RX ADMIN — NAPROXEN 500 MG: 500 TABLET ORAL at 19:50

## 2017-06-16 NOTE — ED AVS SNAPSHOT
[facility], Emergency Department    [address] [address]    Phone:  [phone]                                       Kai Sims   MRN: [id number]    Department:  [facility], Emergency Department   Date of Visit:  6/16/2017           Patient Information     Date Of Birth          1975        Your diagnoses for this visit were:     Sprain of right ankle, unspecified ligament, initial encounter        You were seen by Sami Hinkle MD.        Discharge Instructions       Please make an appointment to follow up with Your Primary Care Provider as soon as possible if you have any concerns.  Results for orders placed or performed during the hospital encounter of 06/16/17   Ankle XR, G/E 3 views, right    Narrative    Exam: XR ANKLE RT G/E 3 VW, 6/16/2017 7:50 PM    Indication: injury, jumped over something... :)    Comparison: None available    Findings:   Nonweightbearing AP, oblique, lateral views of the ankle were  obtained. Normal osseous alignment. No acute fractures. Moderate soft  tissue swelling overlying the medial malleolus and posterior to the  ankle.      Impression    Impression:   Soft tissue swelling without acute fractures.         Understanding Ankle Sprain    The ankle is the joint where the leg and foot meet. Bones are held in place by connective tissue called ligaments. When ankle ligaments are stretched to the point of pain and injury, it is called an ankle sprain. A sprain can tear the ligaments. These tears can be very small but still cause pain. Ankle sprains can be mild or severe.  What causes an ankle sprain?  A sprain may occur when you twist your ankle or bend it too far. This can happen when you stumble or fall. Things that can make an ankle sprain more likely include:    Having had an ankle sprain before    Playing sports that involve running and jumping. Or playing contact sports such as football or hockey.    Wearing shoes that don t support your  feet and ankles well    Having ankles with poor strength and flexibility  Symptoms of an ankle sprain  Symptoms may include:    Pain or soreness in the ankle    Swelling    Redness or bruising    Not being able to walk or put weight on the affected foot    Reduced range of motion in the ankle    A popping or tearing feeling at the time the sprain occurs    An abnormal or dislocated look to the ankle    Instability or too much range of motion in the ankle  Treatment for an ankle sprain  Treatment focuses on reducing pain and swelling, and avoiding further injury. Treatments may include:    Resting the ankle. Avoid putting weight on it. This may mean using crutches until the sprain heals.    Prescription or over-the-counter pain medicines. These help reduce swelling and pain.    Cold packs. These help reduce pain and swelling.    Raising your ankle above your heart. This helps reduce swelling.    Wrapping the ankle with an elastic bandage or ankle brace. This helps reduce swelling and gives some support to the ankle. In rare cases, you may need a cast or boot.    Stretching and other exercises. These improve flexibility and strength.    Heat packs. These may be recommended before doing ankle exercises.  Possible complications of an ankle sprain  An ankle that has been weakened by a sprain can be more likely to have repeated sprains afterward. Doing exercises to strengthen your ankle and improve balance can reduce your risk for repeated sprains. Other possible complications are long-term (chronic) pain or an ankle that remains unstable.  When to call your healthcare provider  Call your healthcare provider right away if you have any of these:    Fever of 100.4 F (38 C) or higher, or as directed    Pain, numbness, discoloration, or coldness in the foot or toes    Pain that gets worse    Symptoms that don t get better, or get worse    New symptoms   Date Last Reviewed: 3/10/2016    [id number] [employer] [address]  [place] [address]. All rights reserved. This information is not intended as a substitute for professional medical care. Always follow your healthcare professional's instructions.          24 Hour Appointment Hotline       To make an appointment at any [facility] clinic, call [phone] ([phone]). If you don't have a family doctor or clinic, we will help you find one. [facility] clinics are conveniently located to serve the needs of you and your family.          ED Discharge Orders     Equalizer Walker                    Review of your medicines      START taking        Dose / Directions Last dose taken    naproxen 500 MG tablet   Commonly known as:  NAPROSYN   Dose:  500 mg   Quantity:  16 tablet        Take 1 tablet (500 mg) by mouth 2 times daily (with meals) for 8 days   Refills:  0          Our records show that you are taking the medicines listed below. If these are incorrect, please call your family doctor or clinic.        Dose / Directions Last dose taken    alum & mag hydroxide-simethicone 200-200-20 MG/5ML Susp suspension   Commonly known as:  MYLANTA/MAALOX   Dose:  30 mL        Take 30 mLs by mouth every 6 hours as needed for indigestion   Refills:  0        * divalproex 500 MG EC tablet   Commonly known as:  DEPAKOTE   Dose:  1500 mg   Quantity:  90 tablet        Take 3 tablets (1,500 mg) by mouth At Bedtime   Refills:  0        * divalproex 500 MG EC tablet   Commonly known as:  DEPAKOTE   Dose:  500 mg   Quantity:  60 tablet        Take 1 tablet (500 mg) by mouth every morning   Refills:  0        folic acid 1 MG tablet   Commonly known as:  FOLVITE   Dose:  1 mg   Quantity:  30 tablet        Take 1 tablet (1 mg) by mouth daily   Refills:  0        gabapentin 300 MG capsule   Commonly known as:  NEURONTIN   Dose:  300 mg   Quantity:  90 capsule        Take 1 capsule (300 mg) by mouth 4 times daily   Refills:  1        guaiFENesin 20 mg/mL Soln solution   Commonly known as:   ROBITUSSIN   Dose:  10 mL        Take 10 mLs by mouth every 4 hours as needed for cough   Refills:  0        IBUPROFEN PO   Dose:  200-400 mg        Take 200-400 mg by mouth every 6 hours as needed for moderate pain   Refills:  0        loratadine 10 MG tablet   Commonly known as:  CLARITIN   Dose:  10 mg        Take 10 mg by mouth daily as needed for allergies   Refills:  0        lurasidone 20 MG Tabs tablet   Commonly known as:  LATUDA   Dose:  20 mg   Quantity:  60 tablet        Take 1 tablet (20 mg) by mouth 2 times daily   Refills:  0        mirtazapine 15 MG tablet   Commonly known as:  REMERON   Dose:  15 mg   Quantity:  30 tablet        Take 1 tablet (15 mg) by mouth At Bedtime   Refills:  0        multivitamin, therapeutic with minerals Tabs tablet   Dose:  1 tablet   Quantity:  30 each        Take 1 tablet by mouth daily   Refills:  0        nicotine polacrilex 4 MG lozenge   Dose:  4-8 mg   Quantity:  360 tablet        Place 1-2 lozenges (4-8 mg) inside cheek every hour as needed for other (nicotine withdrawal symptoms)   Refills:  0        phenol-menthol 14.5 MG lozenge   Dose:  1 lozenge        Place 1 lozenge inside cheek every 2 hours as needed for moderate pain   Refills:  0        * QUEtiapine 100 MG tablet   Commonly known as:  SEROquel   Dose:   mg   Quantity:  90 tablet        Take 0.5-1 tablets ( mg) by mouth 4 times daily as needed (Anxiety, Agitation)   Refills:  1        * QUEtiapine 200 MG tablet   Commonly known as:  SEROquel   Dose:  200 mg   Quantity:  60 tablet        Take 1 tablet (200 mg) by mouth At Bedtime   Refills:  0        senna-docusate 8.6-50 MG per tablet   Commonly known as:  SENOKOT-S;PERICOLACE   Dose:  2 tablet        Take 2 tablets by mouth nightly as needed for constipation   Refills:  0        thiamine 100 MG tablet   Dose:  100 mg   Quantity:  30 tablet        Take 1 tablet (100 mg) by mouth daily   Refills:  0        * Notice:  This list has 4  "medication(s) that are the same as other medications prescribed for you. Read the directions carefully, and ask your doctor or other care provider to review them with you.            Prescriptions were sent or printed at these locations (1 Prescription)                   Other Prescriptions                Printed at Department/Unit printer (1 of 1)         naproxen (NAPROSYN) 500 MG tablet                Procedures and tests performed during your visit     Ankle XR, G/E 3 views, right      Orders Needing Specimen Collection     None      Pending Results     Date and Time Order Name Status Description    6/16/2017 1933 Ankle XR, G/E 3 views, right Preliminary             Pending Culture Results     No orders found from 6/14/2017 to 6/17/2017.            Pending Results Instructions     If you had any lab results that were not finalized at the time of your Discharge, you can call the ED Lab Result RN at [phone]. You will be contacted by this team for any positive Lab results or changes in treatment. The nurses are available 7 days a week from 10A to 6:30P.  You can leave a message 24 hours per day and they will return your call.        Thank you for choosing [facility]       Thank you for choosing [facility] for your care. Our goal is always to provide you with excellent care. Hearing back from our patients is one way we can continue to improve our services. Please take a few minutes to complete the written survey that you may receive in the mail after you visit with us. Thank you!        [facility] Information     [employer] lets you send messages to your doctor, view your test results, renew your prescriptions, schedule appointments and more. To sign up, go to [email or web address] . Click on \"Log in\" on the left side of the screen, which will take you to the Welcome page. Then click on \"Sign up Now\" on the right side of the page.     You will be asked to enter the access code listed below, as well as some personal " information. Please follow the directions to create your username and password.     Your access code is: [email or web address]  Expires: 2017 12:24 PM     Your access code will  in 90 days. If you need help or a new code, please call your [facility] clinic or [phone].        Care EveryWhere ID     This is your Care EveryWhere ID. This could be used by other organizations to access your [facility] medical records  [id number]        After Visit Summary       This is your record. Keep this with you and show to your community pharmacist(s) and doctor(s) at your next visit.

## 2017-06-16 NOTE — ED AVS SNAPSHOT
[facility], [place], Emergency Department    [address] [address]    Phone:  [phone]                                       Kai Sims   MRN: [id number]    Department:  [facility], Emergency Department   Date of Visit:  6/16/2017           After Visit Summary Signature Page     I have received my discharge instructions, and my questions have been answered. I have discussed any challenges I see with this plan with the nurse or doctor.    ..........................................................................................................................................  Patient/Patient Representative Signature      ..........................................................................................................................................  Patient Representative Print Name and Relationship to Patient    ..................................................               ................................................  Date                                            Time    ..........................................................................................................................................  Reviewed by Signature/Title    ...................................................              ..............................................  Date                                                            Time

## 2017-06-17 NOTE — ED PROVIDER NOTES
[place] EMERGENCY DEPARTMENT ([facility])  June 16, 2017  ED 22   History     Chief Complaint   Patient presents with     Ankle Pain     HPI  Kai Sims is a 41 year old male who presents with right ankle pain and swelling after landing from a jump. PT would not say what he jumped from. He denies all other injuries    I have reviewed the Medications, Allergies, Past Medical and Surgical History, and Social History in the Epic system.    Review of Systems   Musculoskeletal:        Right ankle pain and swelling   All other systems reviewed and are negative.      Physical Exam   BP: (!) 133/91  Pulse: 114  Temp: 99.3  F (37.4  C)  Resp: 16  Weight: 68 kg (150 lb)  SpO2: 97 %  Physical Exam   Constitutional: He is oriented to person, place, and time. He appears well-developed and well-nourished. No distress.   HENT:   Head: Normocephalic and atraumatic.   Cardiovascular: Normal rate.    Pulmonary/Chest: Effort normal and breath sounds normal. No respiratory distress. He exhibits no tenderness.   Abdominal: Soft. There is no tenderness.   Musculoskeletal: Normal range of motion.        Right ankle: He exhibits swelling. He exhibits normal range of motion. Tenderness. Lateral malleolus and medial malleolus tenderness found. Achilles tendon normal.        Cervical back: He exhibits no tenderness.        Thoracic back: He exhibits no tenderness.        Lumbar back: He exhibits no tenderness.   Neurological: He is alert and oriented to person, place, and time.   Skin: No abrasion and no laceration noted. He is not diaphoretic.       ED Course     ED Course     Procedures  Results for orders placed or performed during the hospital encounter of 06/16/17 (from the past 24 hour(s))   Ankle XR, G/E 3 views, right    Narrative    Exam: XR ANKLE RT G/E 3 VW, 6/16/2017 7:50 PM    Indication: injury, jumped over something... :)    Comparison: None available    Findings:   Nonweightbearing AP, oblique, lateral views  of the ankle were  obtained. Normal osseous alignment. No acute fracture. Moderate soft  tissue swelling overlying the medial malleolus and posterior to the  ankle.      Impression    Impression:   Soft tissue swelling without acute fracture.    I have personally reviewed the examination and initial interpretation  and I agree with the findings.    MAYELA MIRZA MD     Medications   naproxen (NAPROSYN) tablet 500 mg (500 mg Oral Given 6/16/17 1950)   oxyCODONE (ROXICODONE) IR tablet 5 mg (5 mg Oral Given 6/16/17 1950)         Assessments & Plan (with Medical Decision Making)   This is a 41-year-old male presenting to the emergency room complaining of right ankle pain after jumping from some unknown object.  His ankle shows some minimal tenderness on the medial and lateral aspects.  There is no obvious bony deformity.  He was able to bear some weight on it.  Pulses are good throughout.  X-ray confirms no obvious signs of fracture however there is some soft tissue swelling.  He'll be diagnosed with a ankle sprain.  Is provided with a cam walker boot and instructed utilize naproxen for discomfort.    I have reviewed the nursing notes.    I have reviewed the findings, diagnosis, plan and need for follow up with the patient.    Discharge Medication List as of 6/16/2017  8:29 PM      START taking these medications    Details   naproxen (NAPROSYN) 500 MG tablet Take 1 tablet (500 mg) by mouth 2 times daily (with meals) for 8 days, Disp-16 tablet, R-0, Local Print             Final diagnoses:   Sprain of right ankle, unspecified ligament, initial encounter     IGeorgie, am serving as a trained medical scribe to document services personally performed by Carlos Hinkle MD, based on the provider's statements to me.  This document has been checked and approved by the attending provider.     Carlos POMPA MD, was physically present and have reviewed and verified the accuracy of this note documented by Georgie ZAPATA  case [place] scribe.     6/16/2017   [facility], [place], EMERGENCY DEPARTMENT     Sami Hinkle MD  06/16/17 0157

## 2017-06-17 NOTE — DISCHARGE INSTRUCTIONS
Please make an appointment to follow up with Your Primary Care Provider as soon as possible if you have any concerns.  Results for orders placed or performed during the hospital encounter of 06/16/17   Ankle XR, G/E 3 views, right    Narrative    Exam: XR ANKLE RT G/E 3 VW, 6/16/2017 7:50 PM    Indication: injury, jumped over something... :)    Comparison: None available    Findings:   Nonweightbearing AP, oblique, lateral views of the ankle were  obtained. Normal osseous alignment. No acute fractures. Moderate soft  tissue swelling overlying the medial malleolus and posterior to the  ankle.      Impression    Impression:   Soft tissue swelling without acute fractures.         Understanding Ankle Sprain    The ankle is the joint where the leg and foot meet. Bones are held in place by connective tissue called ligaments. When ankle ligaments are stretched to the point of pain and injury, it is called an ankle sprain. A sprain can tear the ligaments. These tears can be very small but still cause pain. Ankle sprains can be mild or severe.  What causes an ankle sprain?  A sprain may occur when you twist your ankle or bend it too far. This can happen when you stumble or fall. Things that can make an ankle sprain more likely include:    Having had an ankle sprain before    Playing sports that involve running and jumping. Or playing contact sports such as football or hockey.    Wearing shoes that don t support your feet and ankles well    Having ankles with poor strength and flexibility  Symptoms of an ankle sprain  Symptoms may include:    Pain or soreness in the ankle    Swelling    Redness or bruising    Not being able to walk or put weight on the affected foot    Reduced range of motion in the ankle    A popping or tearing feeling at the time the sprain occurs    An abnormal or dislocated look to the ankle    Instability or too much range of motion in the ankle  Treatment for an ankle sprain  Treatment focuses on  reducing pain and swelling, and avoiding further injury. Treatments may include:    Resting the ankle. Avoid putting weight on it. This may mean using crutches until the sprain heals.    Prescription or over-the-counter pain medicines. These help reduce swelling and pain.    Cold packs. These help reduce pain and swelling.    Raising your ankle above your heart. This helps reduce swelling.    Wrapping the ankle with an elastic bandage or ankle brace. This helps reduce swelling and gives some support to the ankle. In rare cases, you may need a cast or boot.    Stretching and other exercises. These improve flexibility and strength.    Heat packs. These may be recommended before doing ankle exercises.  Possible complications of an ankle sprain  An ankle that has been weakened by a sprain can be more likely to have repeated sprains afterward. Doing exercises to strengthen your ankle and improve balance can reduce your risk for repeated sprains. Other possible complications are long-term (chronic) pain or an ankle that remains unstable.  When to call your healthcare provider  Call your healthcare provider right away if you have any of these:    Fever of 100.4 F (38 C) or higher, or as directed    Pain, numbness, discoloration, or coldness in the foot or toes    Pain that gets worse    Symptoms that don t get better, or get worse    New symptoms   Date Last Reviewed: 3/10/2016    2236-0510 [employer]. [address] [place] [address], [place] [address]. All rights reserved. This information is not intended as a substitute for professional medical care. Always follow your healthcare professional's instructions.

## 2021-06-01 ENCOUNTER — RECORDS - HEALTHEAST (OUTPATIENT)
Dept: ADMINISTRATIVE | Facility: CLINIC | Age: 46
End: 2021-06-01

## 2021-06-02 ENCOUNTER — RECORDS - HEALTHEAST (OUTPATIENT)
Dept: ADMINISTRATIVE | Facility: CLINIC | Age: 46
End: 2021-06-02

## 2023-09-11 ENCOUNTER — TELEPHONE (OUTPATIENT)
Dept: BEHAVIORAL HEALTH | Facility: CLINIC | Age: 48
End: 2023-09-11

## 2023-09-11 ENCOUNTER — HOSPITAL ENCOUNTER (EMERGENCY)
Facility: CLINIC | Age: 48
Discharge: HOME | End: 2023-09-11
Attending: FAMILY MEDICINE | Admitting: FAMILY MEDICINE
Payer: COMMERCIAL

## 2023-09-11 VITALS
WEIGHT: 131.8 LBS | HEIGHT: 67 IN | TEMPERATURE: 98.7 F | RESPIRATION RATE: 16 BRPM | BODY MASS INDEX: 20.69 KG/M2 | DIASTOLIC BLOOD PRESSURE: 74 MMHG | HEART RATE: 77 BPM | OXYGEN SATURATION: 100 % | SYSTOLIC BLOOD PRESSURE: 118 MMHG

## 2023-09-11 DIAGNOSIS — F15.90 AMPHETAMINE USE: ICD-10-CM

## 2023-09-11 DIAGNOSIS — R56.9 ALCOHOL WITHDRAWAL SEIZURE WITH COMPLICATION (H): ICD-10-CM

## 2023-09-11 DIAGNOSIS — F14.90 COCAINE USE: ICD-10-CM

## 2023-09-11 DIAGNOSIS — F10.129 ALCOHOL ABUSE WITH INTOXICATION (H): ICD-10-CM

## 2023-09-11 DIAGNOSIS — F10.939 ALCOHOL WITHDRAWAL SEIZURE WITH COMPLICATION (H): ICD-10-CM

## 2023-09-11 PROBLEM — F10.90 ALCOHOL USE, UNSPECIFIED, UNCOMPLICATED: Chronic | Status: ACTIVE | Noted: 2023-09-11

## 2023-09-11 PROBLEM — F39 UNSPECIFIED MOOD (AFFECTIVE) DISORDER (H): Chronic | Status: ACTIVE | Noted: 2023-09-11

## 2023-09-11 LAB
ALBUMIN SERPL BCG-MCNC: 4.8 G/DL (ref 3.5–5.2)
ALCOHOL BREATH TEST: 0.23 (ref 0–0.01)
ALP SERPL-CCNC: 110 U/L (ref 40–129)
ALT SERPL W P-5'-P-CCNC: 247 U/L (ref 0–70)
AMPHETAMINES UR QL SCN: ABNORMAL
ANION GAP SERPL CALCULATED.3IONS-SCNC: 16 MMOL/L (ref 7–15)
AST SERPL W P-5'-P-CCNC: 291 U/L (ref 0–45)
BARBITURATES UR QL SCN: ABNORMAL
BASOPHILS # BLD AUTO: 0.1 10E3/UL (ref 0–0.2)
BASOPHILS NFR BLD AUTO: 1 %
BENZODIAZ UR QL SCN: ABNORMAL
BILIRUB SERPL-MCNC: 0.3 MG/DL
BUN SERPL-MCNC: 21 MG/DL (ref 6–20)
BZE UR QL SCN: ABNORMAL
CALCIUM SERPL-MCNC: 9.9 MG/DL (ref 8.6–10)
CANNABINOIDS UR QL SCN: ABNORMAL
CHLORIDE SERPL-SCNC: 101 MMOL/L (ref 98–107)
CREAT SERPL-MCNC: 0.83 MG/DL (ref 0.67–1.17)
DEPRECATED HCO3 PLAS-SCNC: 21 MMOL/L (ref 22–29)
EGFRCR SERPLBLD CKD-EPI 2021: >90 ML/MIN/1.73M2
EOSINOPHIL # BLD AUTO: 0.2 10E3/UL (ref 0–0.7)
EOSINOPHIL NFR BLD AUTO: 3 %
ERYTHROCYTE [DISTWIDTH] IN BLOOD BY AUTOMATED COUNT: 13.3 % (ref 10–15)
GLUCOSE SERPL-MCNC: 98 MG/DL (ref 70–99)
HCT VFR BLD AUTO: 42.6 % (ref 40–53)
HGB BLD-MCNC: 15.2 G/DL (ref 13.3–17.7)
IMM GRANULOCYTES # BLD: 0 10E3/UL
IMM GRANULOCYTES NFR BLD: 0 %
LYMPHOCYTES # BLD AUTO: 3.2 10E3/UL (ref 0.8–5.3)
LYMPHOCYTES NFR BLD AUTO: 44 %
MAGNESIUM SERPL-MCNC: 2.1 MG/DL (ref 1.7–2.3)
MCH RBC QN AUTO: 31 PG (ref 26.5–33)
MCHC RBC AUTO-ENTMCNC: 35.7 G/DL (ref 31.5–36.5)
MCV RBC AUTO: 87 FL (ref 78–100)
MONOCYTES # BLD AUTO: 0.7 10E3/UL (ref 0–1.3)
MONOCYTES NFR BLD AUTO: 10 %
NEUTROPHILS # BLD AUTO: 3.1 10E3/UL (ref 1.6–8.3)
NEUTROPHILS NFR BLD AUTO: 42 %
NRBC # BLD AUTO: 0 10E3/UL
NRBC BLD AUTO-RTO: 0 /100
OPIATES UR QL SCN: ABNORMAL
PHOSPHATE SERPL-MCNC: 4.1 MG/DL (ref 2.5–4.5)
PLATELET # BLD AUTO: 258 10E3/UL (ref 150–450)
POTASSIUM SERPL-SCNC: 3.7 MMOL/L (ref 3.4–5.3)
PROT SERPL-MCNC: 7.7 G/DL (ref 6.4–8.3)
RBC # BLD AUTO: 4.9 10E6/UL (ref 4.4–5.9)
SODIUM SERPL-SCNC: 138 MMOL/L (ref 136–145)
WBC # BLD AUTO: 7.3 10E3/UL (ref 4–11)

## 2023-09-11 PROCEDURE — 99284 EMERGENCY DEPT VISIT MOD MDM: CPT | Performed by: FAMILY MEDICINE

## 2023-09-11 PROCEDURE — 99285 EMERGENCY DEPT VISIT HI MDM: CPT | Mod: 25 | Performed by: FAMILY MEDICINE

## 2023-09-11 PROCEDURE — 80307 DRUG TEST PRSMV CHEM ANLYZR: CPT | Performed by: EMERGENCY MEDICINE

## 2023-09-11 PROCEDURE — 84100 ASSAY OF PHOSPHORUS: CPT | Performed by: EMERGENCY MEDICINE

## 2023-09-11 PROCEDURE — 83735 ASSAY OF MAGNESIUM: CPT | Performed by: EMERGENCY MEDICINE

## 2023-09-11 PROCEDURE — 85025 COMPLETE CBC W/AUTO DIFF WBC: CPT | Performed by: EMERGENCY MEDICINE

## 2023-09-11 PROCEDURE — 250N000013 HC RX MED GY IP 250 OP 250 PS 637: Performed by: EMERGENCY MEDICINE

## 2023-09-11 PROCEDURE — 90791 PSYCH DIAGNOSTIC EVALUATION: CPT

## 2023-09-11 PROCEDURE — 80053 COMPREHEN METABOLIC PANEL: CPT | Performed by: EMERGENCY MEDICINE

## 2023-09-11 PROCEDURE — 36415 COLL VENOUS BLD VENIPUNCTURE: CPT | Performed by: EMERGENCY MEDICINE

## 2023-09-11 RX ORDER — POLYETHYLENE GLYCOL 3350 17 G
2 POWDER IN PACKET (EA) ORAL
Status: DISCONTINUED | OUTPATIENT
Start: 2023-09-11 | End: 2023-09-11 | Stop reason: HOSPADM

## 2023-09-11 RX ORDER — OLANZAPINE 5 MG/1
5-10 TABLET, ORALLY DISINTEGRATING ORAL EVERY 6 HOURS PRN
Status: DISCONTINUED | OUTPATIENT
Start: 2023-09-11 | End: 2023-09-11 | Stop reason: HOSPADM

## 2023-09-11 RX ORDER — MULTIPLE VITAMINS W/ MINERALS TAB 9MG-400MCG
1 TAB ORAL DAILY
Status: DISCONTINUED | OUTPATIENT
Start: 2023-09-11 | End: 2023-09-11 | Stop reason: HOSPADM

## 2023-09-11 RX ORDER — DIAZEPAM 10 MG
10 TABLET ORAL EVERY 30 MIN PRN
Status: DISCONTINUED | OUTPATIENT
Start: 2023-09-11 | End: 2023-09-11 | Stop reason: HOSPADM

## 2023-09-11 RX ORDER — CLONIDINE HYDROCHLORIDE 0.1 MG/1
0.1 TABLET ORAL EVERY 8 HOURS
Status: DISCONTINUED | OUTPATIENT
Start: 2023-09-11 | End: 2023-09-11 | Stop reason: HOSPADM

## 2023-09-11 RX ORDER — DIAZEPAM 10 MG/2ML
5-10 INJECTION, SOLUTION INTRAMUSCULAR; INTRAVENOUS EVERY 30 MIN PRN
Status: DISCONTINUED | OUTPATIENT
Start: 2023-09-11 | End: 2023-09-11 | Stop reason: HOSPADM

## 2023-09-11 RX ORDER — HALOPERIDOL 5 MG/ML
2.5-5 INJECTION INTRAMUSCULAR EVERY 6 HOURS PRN
Status: DISCONTINUED | OUTPATIENT
Start: 2023-09-11 | End: 2023-09-11 | Stop reason: HOSPADM

## 2023-09-11 RX ORDER — DIAZEPAM 5 MG
5-20 TABLET ORAL EVERY 30 MIN PRN
Status: CANCELLED | OUTPATIENT
Start: 2023-09-11

## 2023-09-11 RX ORDER — POLYETHYLENE GLYCOL 3350 17 G
4 POWDER IN PACKET (EA) ORAL
Status: DISCONTINUED | OUTPATIENT
Start: 2023-09-11 | End: 2023-09-11 | Stop reason: HOSPADM

## 2023-09-11 RX ORDER — ATENOLOL 50 MG/1
50 TABLET ORAL DAILY PRN
Status: CANCELLED | OUTPATIENT
Start: 2023-09-11

## 2023-09-11 RX ORDER — FOLIC ACID 1 MG/1
1 TABLET ORAL DAILY
Status: DISCONTINUED | OUTPATIENT
Start: 2023-09-11 | End: 2023-09-11 | Stop reason: HOSPADM

## 2023-09-11 RX ORDER — FLUMAZENIL 0.1 MG/ML
0.2 INJECTION, SOLUTION INTRAVENOUS
Status: DISCONTINUED | OUTPATIENT
Start: 2023-09-11 | End: 2023-09-11 | Stop reason: HOSPADM

## 2023-09-11 RX ORDER — FOLIC ACID 1 MG/1
1 TABLET ORAL DAILY
Status: CANCELLED | OUTPATIENT
Start: 2023-09-11

## 2023-09-11 RX ORDER — MULTIPLE VITAMINS W/ MINERALS TAB 9MG-400MCG
1 TAB ORAL DAILY
Status: CANCELLED | OUTPATIENT
Start: 2023-09-11

## 2023-09-11 RX ADMIN — NICOTINE POLACRILEX 2 MG: 2 LOZENGE ORAL at 13:12

## 2023-09-11 RX ADMIN — FOLIC ACID 1 MG: 1 TABLET ORAL at 13:17

## 2023-09-11 RX ADMIN — THIAMINE HCL TAB 100 MG 100 MG: 100 TAB at 13:17

## 2023-09-11 RX ADMIN — MULTIPLE VITAMINS W/ MINERALS TAB 1 TABLET: TAB at 13:17

## 2023-09-11 RX ADMIN — NICOTINE POLACRILEX 4 MG: 2 LOZENGE ORAL at 12:15

## 2023-09-11 RX ADMIN — CLONIDINE HYDROCHLORIDE 0.1 MG: 0.1 TABLET ORAL at 13:17

## 2023-09-11 RX ADMIN — OLANZAPINE 5 MG: 5 TABLET, ORALLY DISINTEGRATING ORAL at 13:57

## 2023-09-11 RX ADMIN — DIAZEPAM 10 MG: 10 TABLET ORAL at 13:57

## 2023-09-11 ASSESSMENT — ACTIVITIES OF DAILY LIVING (ADL)
ADLS_ACUITY_SCORE: 35
ADLS_ACUITY_SCORE: 35

## 2023-09-11 NOTE — DISCHARGE INSTRUCTIONS
Thank you for choosing [facility] for your care. It was a pleasure taking care of you today in our Emergency Department.       Instructions from your doctor today:  Emergency Department (ED) testing is focused on the potential causes of your symptoms that are the most dangerous possibilities, and cannot cover every possibility. Based on the evaluation, it was deemed sufficiently safe to discharge and continue management through the clinics. Thus, follow-up is very important to assess for improvement/worsening, potential further testing, and potential treatment adjustments. If you were given opioid pain medications or other medications that can make you drowsy while in the ED, you should not drive for at least several hours and not until you feel completely back to normal.     Please make an appointment to follow up with:  - Your Primary Care Provider in 2-4 days with any questions or concerns  - If you do not have a primary care provider, you can be seen in follow-up and establish care by calling any of the clinics below:     - [facility] (phone: [phone])     - Primary Care / [facility] (phone: [phone])   - Have your clinic provider review the results from today's visit with you again, including any potential follow-up or additional testing that may be needed based on the results. Occasionally, incidental findings are found on later review by radiologists that may need follow-up.     If you have continued worsening symptoms, please return to the emergency department.

## 2023-09-11 NOTE — ED PROVIDER NOTES
"    [facility] [place] EMERGENCY DEPARTMENT ([facility])    9/11/23      ED PROVIDER NOTE   Edway B 12:42 PM     History     Chief Complaint   Patient presents with    Alcohol Intoxication     Pt states \" I've been drinking mouthwash, I need help coming off of mouthwash.\" Pt unable to provide amount of mouthwash he drank. Pt states \"I've been drinking all night long.\" Pt has seizure hisory, last seizure about a month ago per patient report.    Suicidal     Patient is positive for SI, denies any specific plans     The history is provided by the patient and medical records.     Kai Sims is a 47 year old male with history of severe alcohol use disorder, IV meth use, bipolar 1 disorder, hepatitis C, frostbite of both hands complicated by nonadherence to medical recommendations, hepatic steatosis who presents emergency department seeking alcohol detox.  He has been drinking 3 L of gold mouth wash daily as well as a liter of vodka a day. CareEverywhere records reviewed, he had a clinic visit for [facility] on 9/8/2023, reported having nausea, vomiting and possibly blood in his stool.  He was seen by Jazzmine Roy NP who recommended [facility] for stabilization and transition to detox, wrote him prescription for buprenorphine patch as well as Prilosec and gabapentin.  He now presents for evaluation. Patient states that he has a prior history of alcohol withdrawal seizures as well as DTs. He can't detox at home as a result, is scared of withdrawal. He has been dry heaving a lot, tries to stop himself from vomiting. He states his stomach feels like it is on fire sometimes when he drinks the mouthwash, states it feels like it burns his stomach. He last drank here in the parking lot before presenting to the Emergency Department. He feels like he is withdrawing and is anxious. He last used meth yesterday IV, no meth today. He doesn't abuse opiate pain medications. 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 He states the patch came off yesterday in the shower. He is on psychiatric medications as well, Vraylar. He hasn't been taking it. No suicidal or homicidal ideation. No recent fevers, chills. No known drug allergies.     Per Care Everywhere records, he does have a prior history of GERD, is on viscous lidocaine for this.  He has not had any evaluations with EGD, has declined to this historically.  Patient resides at [facility].    Past Medical History  Past Medical History:   Diagnosis Date    Bipolar affective disorder (H)     Substance abuse      Past Surgical History:   Procedure Laterality Date    ENT SURGERY      ORTHOPEDIC SURGERY       alum & mag hydroxide-simethicone (MYLANTA/MAALOX) 200-200-20 MG/5ML SUSP suspension  divalproex (DEPAKOTE) 500 MG EC tablet  divalproex (DEPAKOTE) 500 MG EC tablet  folic acid (FOLVITE) 1 MG tablet  gabapentin (NEURONTIN) 300 MG capsule  guaiFENesin (ROBITUSSIN) 20 mg/mL SOLN solution  IBUPROFEN PO  loratadine (CLARITIN) 10 MG tablet  lurasidone (LATUDA) 20 MG TABS tablet  mirtazapine (REMERON) 15 MG tablet  multivitamin, therapeutic with minerals (THERA-VIT-M) TABS tablet  nicotine polacrilex 4 MG lozenge  phenol-menthol (CEPASTAT) 14.5 MG lozenge  QUEtiapine (SEROQUEL) 100 MG tablet  QUEtiapine (SEROQUEL) 200 MG tablet  senna-docusate (SENOKOT-S;PERICOLACE) 8.6-50 MG per tablet  thiamine 100 MG tablet      No Known Allergies  Family History  Family History   Problem Relation Age of Onset    Substance Abuse Father     Substance Abuse Sister      Social History   Social History     Tobacco Use    Smoking status: Every Day     Packs/day: 1.00     Types: Cigarettes   Substance Use Topics    Alcohol use: Yes     Comment: 1L vodka daily    Drug use: Yes     Types: Marijuana, Methamphetamines     Comment: Clean for 15 days         A medically appropriate review of systems was performed with pertinent positives and negatives  "noted in the HPI, and all other systems negative.    Physical Exam   BP: 124/77  Pulse: 109  Temp: 98.1  F (36.7  C)  Resp: 18  Height: 170.2 cm (5' 7\")  Weight: 59.8 kg (131 lb 12.8 oz)  SpO2: 96 %  Physical Exam  General: Alert, sitting on the bed in NAD  Neuro: Awake alert &O x 3 CN 2-12 intact; no pronator drift present; strength 5/5 at elbow flexion; strength 5/5 at elbow extension with good  strength bilaterally; strength 5/5 at ankle dorsiflexion; strength 5/5 at ankle plantar flexion; tandem gait is in tact; finger-to-nose is intact and symmetrical; sensation is  intact to light touch throughout;   Head: atraumatic, no maxillary no frontal sinus tenderness  Nose:  no rhinnhorea  Eyes: Pupils 3mm equal round and reactive to light  Mouth: mmm  Neck: no carotid bruits  Cardiovascular: regular; S1/S2 ; radial pulses equal and symmetrical  Lungs: CTAB bilaterally  Abdomen:  soft non-tender +BS      ED Course, Procedures, & Data      Soon after arrival, patient was evaluated, and we discussed risks and benefits of putting patient on [facility] protocol for alcohol withdrawal.  Patient was in agreement with plan.  He stated he did not want to go to detox to get help.  In regards to his mental health assessment, did say he has felt depressed at some point in the last couple of weeks but he has no suicidal ideation or homicidal ideation at this time.    Patient received diazepam for alcohol withdrawal.  Initial breathalyzer was 0.233 however patient was monitored in emergency department and over time his assessment was that he became clinically sober in the emergency department.  CBC was unremarkable, CMP showed patient's AST and ALT were elevated at 291 and 247.  In our system and he had a check 6 years ago where they were just slightly elevated at 87 and 90, however he had elevations of 103 and 83 11 years ago.  I discussed patient's lab findings with him, and his anion gap which is likely due to AKA.     After " patient had become clinically sober, he stated he now did not want to go to treatment.  There are other plans he wanted to follow through on, he stated he understood the risks and benefits.  We discussed his lab work again, and his health, and that if he would still like to stop drinking him going to detox and treatment at this time would be the best way.  However patient stated he understood he did not want to stay at this time.  He denies any physical complaints at this time, completed another 4 assessments and physical exam.  patient needed decision to discharge at this time.    Patient discharged in good condition with questions answered. he was given [facility] discharge instructions and follow-up recommendations. Patient will return to the ED if symptoms worsen or he develops any of the concerning signs/symptoms as outlined in the EPIC d/c instructions. Otherwise he will follow up in clinic as recommended in the d/c instructions.       Mental Health Risk Assessment        PSS-3      Date and Time Over the past 2 weeks have you felt down, depressed, or hopeless? Over the past 2 weeks have you had thoughts of killing yourself? Have you ever attempted to kill yourself? When did this last happen? User   09/11/23 1151 yes yes yes more than 6 months ago VS          C-SSRS ([place])      Date and Time Q1 Wished to be Dead (Past Month) Q2 Suicidal Thoughts (Past Month) Q3 Suicidal Thought Method Q4 Suicidal Intent without Specific Plan Q5 Suicide Intent with Specific Plan Q6 Suicide Behavior (Lifetime) Within the Past 3 Months? RETIRED: Level of Risk per Screen Screening Not Complete User   09/11/23 1151 no yes no no no no -- -- -- VS                       Results for orders placed or performed during the hospital encounter of 09/11/23   Comprehensive metabolic panel     Status: Abnormal   Result Value Ref Range    Sodium 138 136 - 145 mmol/L    Potassium 3.7 3.4 - 5.3 mmol/L    Chloride 101 98 - 107 mmol/L    Carbon  Dioxide (CO2) 21 (L) 22 - 29 mmol/L    Anion Gap 16 (H) 7 - 15 mmol/L    Urea Nitrogen 21.0 (H) 6.0 - 20.0 mg/dL    Creatinine 0.83 0.67 - 1.17 mg/dL    Calcium 9.9 8.6 - 10.0 mg/dL    Glucose 98 70 - 99 mg/dL    Alkaline Phosphatase 110 40 - 129 U/L     (H) 0 - 45 U/L     (H) 0 - 70 U/L    Protein Total 7.7 6.4 - 8.3 g/dL    Albumin 4.8 3.5 - 5.2 g/dL    Bilirubin Total 0.3 <=1.2 mg/dL    GFR Estimate >90 >60 mL/min/1.73m2   CBC with platelets and differential     Status: None   Result Value Ref Range    WBC Count 7.3 4.0 - 11.0 10e3/uL    RBC Count 4.90 4.40 - 5.90 10e6/uL    Hemoglobin 15.2 13.3 - 17.7 g/dL    Hematocrit 42.6 40.0 - 53.0 %    MCV 87 78 - 100 fL    MCH 31.0 26.5 - 33.0 pg    MCHC 35.7 31.5 - 36.5 g/dL    RDW 13.3 10.0 - 15.0 %    Platelet Count 258 150 - 450 10e3/uL    % Neutrophils 42 %    % Lymphocytes 44 %    % Monocytes 10 %    % Eosinophils 3 %    % Basophils 1 %    % Immature Granulocytes 0 %    NRBCs per 100 WBC 0 <1 /100    Absolute Neutrophils 3.1 1.6 - 8.3 10e3/uL    Absolute Lymphocytes 3.2 0.8 - 5.3 10e3/uL    Absolute Monocytes 0.7 0.0 - 1.3 10e3/uL    Absolute Eosinophils 0.2 0.0 - 0.7 10e3/uL    Absolute Basophils 0.1 0.0 - 0.2 10e3/uL    Absolute Immature Granulocytes 0.0 <=0.4 10e3/uL    Absolute NRBCs 0.0 10e3/uL   Magnesium     Status: Normal   Result Value Ref Range    Magnesium 2.1 1.7 - 2.3 mg/dL   Phosphorus     Status: Normal   Result Value Ref Range    Phosphorus 4.1 2.5 - 4.5 mg/dL   Alcohol breath test POCT     Status: Abnormal   Result Value Ref Range    Alcohol Breath Test 0.233 (A) 0.00 - 0.01   CBC with platelets differential     Status: None    Narrative    The following orders were created for panel order CBC with platelets differential.  Procedure                               Abnormality         Status                     ---------                               -----------         ------                     CBC with platelets and d...[[id number]]                       Final result                 Please view results for these tests on the individual orders.   Urine Drugs of Abuse Screen     Status: None (In process)    Narrative    The following orders were created for panel order Urine Drugs of Abuse Screen.  Procedure                               Abnormality         Status                     ---------                               -----------         ------                     Drug abuse screen 1 urin...[[id number]]                      In process                   Please view results for these tests on the individual orders.     Medications   nicotine (COMMIT) lozenge 4 mg (4 mg Buccal $Given 9/11/23 1215)   cloNIDine (CATAPRES) tablet 0.1 mg (0.1 mg Oral $Given 9/11/23 1317)   OLANZapine zydis (zyPREXA) ODT tab 5-10 mg (has no administration in time range)     Or   haloperidol lactate (HALDOL) injection 2.5-5 mg (has no administration in time range)   flumazenil (ROMAZICON) injection 0.2 mg (has no administration in time range)   thiamine (B-1) tablet 100 mg (100 mg Oral $Given 9/11/23 1317)   folic acid (FOLVITE) tablet 1 mg (1 mg Oral $Given 9/11/23 1317)   multivitamin w/minerals (THERA-VIT-M) tablet 1 tablet (1 tablet Oral $Given 9/11/23 1317)   diazepam (VALIUM) tablet 10 mg (has no administration in time range)     Or   diazepam (VALIUM) injection 5-10 mg (has no administration in time range)   nicotine (COMMIT) lozenge 2 mg (2 mg Buccal $Given 9/11/23 1312)     Labs Ordered and Resulted from Time of ED Arrival to Time of ED Departure   COMPREHENSIVE METABOLIC PANEL - Abnormal       Result Value    Sodium 138      Potassium 3.7      Chloride 101      Carbon Dioxide (CO2) 21 (*)     Anion Gap 16 (*)     Urea Nitrogen 21.0 (*)     Creatinine 0.83      Calcium 9.9      Glucose 98      Alkaline Phosphatase 110       (*)      (*)     Protein Total 7.7      Albumin 4.8      Bilirubin Total 0.3      GFR Estimate >90     ALCOHOL BREATH TEST POCT  - Abnormal    Alcohol Breath Test 0.233 (*)    MAGNESIUM - Normal    Magnesium 2.1     PHOSPHORUS - Normal    Phosphorus 4.1     CBC WITH PLATELETS AND DIFFERENTIAL    WBC Count 7.3      RBC Count 4.90      Hemoglobin 15.2      Hematocrit 42.6      MCV 87      MCH 31.0      MCHC 35.7      RDW 13.3      Platelet Count 258      % Neutrophils 42      % Lymphocytes 44      % Monocytes 10      % Eosinophils 3      % Basophils 1      % Immature Granulocytes 0      NRBCs per 100 WBC 0      Absolute Neutrophils 3.1      Absolute Lymphocytes 3.2      Absolute Monocytes 0.7      Absolute Eosinophils 0.2      Absolute Basophils 0.1      Absolute Immature Granulocytes 0.0      Absolute NRBCs 0.0     DRUG ABUSE SCREEN 1 URINE (ED)     No orders to display          Critical care was not performed.     Medical Decision Making  The patient's presentation was of high complexity (an acute health issue posing potential threat to life or bodily function).    The patient's evaluation involved:  review of external note(s) from 2 sources (see separate area of note for details)  review of 3+ test result(s) ordered prior to this encounter (see separate area of note for details)  ordering and/or review of 3+ test(s) in this encounter (see separate area of note for details)    The patient's management necessitated high risk (drug therapy requiring intensive monitoring (see separate area of note for details)) and high risk (a decision regarding hospitalization).    Assessment & Plan    (F10.129) Alcohol abuse with intoxication (H)    (F14.90) Cocaine use      (F15.90) Amphetamine use  Plan: Discharge to home with close follow up in Primary care clinic, or return for detox and treatment if patient changes his mind  Return to the ER with any worsening symptoms          I have reviewed the nursing notes. I have reviewed the findings, diagnosis, plan and need for follow up with the patient.    New Prescriptions    No medications on file        Final diagnoses:   None     I, Georgie West, am serving as a trained medical scribe to document services personally performed by Adelita Das MD based on the provider's statements to me on September 11, 2023.  This document has been checked and approved by the attending provider.    Adelita POMPA MD, was physically present and have reviewed and verified the accuracy of this note documented by Georgie West, medical scribe.      Adelita Das MD   [facility] EMERGENCY DEPARTMENT  9/11/2023     Adelita Das MD  09/14/23 0811

## 2023-09-11 NOTE — TELEPHONE ENCOUNTER
S: [facility] , Provider  Dr.Tomlinson calling at 1:40 pm with clinical on a 47 year old/Male presenting for alcohol detox.     B: Pt presents for ETOH detox.   Currently reports drinking 2-3 bottles of mouthwash and a lot of vodka daily for  a long time..    Patient reports last use was  just before coming into the ED.  Pt MARIN:  .233    Pt  endorses hx of DT  Pt  endorses hx of seizures. Last seizure:  about 1 mo. Ago.  Pt endorsing the following symptoms of withdrawal: Shaky      Pt denies acute mental health or medical concerns.   Pt endorses other drug use: Amphetamines , cocaine, cannabis.Amount/frequency:  pt unable to answer.    Does Pt have a detox care plan in [facility]?  no  Does pt present with specific needs, assistive devices, or exclusionary criteria? None  Is the patient ambulating, eating and drinking in the ED?   Ambulating, yes.  Has not eaten or had anything to drink yet.    A: Pt meets criteria to be presented for IP detox admission. Patient is voluntary    COVID Symptoms: No  If yes, COVID test required   Utox: Positive for amphetamines, cocaine and  cannabis  CMP: Abnormalities: ,   CBC: WNL  HCG: N/A     R: Patient cleared and ready for behavioral bed placement: Yes    Pt is meeting criteria for presentation to 3A/CD    Does Patient need a Transfer Center request created? No, Pt is located within [facility] ED, [facility] ED, or [place] [facility]      Admit to 3A  straight CD     accepts for herself    2:20  3A, Jeff, informed  2:23  ED, N[place], informed

## 2023-09-11 NOTE — CONSULTS
Diagnostic Evaluation Consultation  Crisis Assessment    Patient Name: Kai Sims  Age:  47 year old  Legal Sex: male  Gender Identity: male  Pronouns: he/him  Race: White  Ethnicity: Not  or   Language: English    Patient was assessed: In person      Patient location: [facility] EMERGENCY DEPARTMENT                             [facility]    Referral Data and Chief Complaint  Kai Sims presents to the ED by  self. Patient is presenting to the ED for the following concerns: Intoxication, Substance use, Anxiety, Depression.   Factors that make the mental health crisis life threatening or complex are:  Pt presenting with acute alcohol intoxication and feeling like his mind is 'fucked up.'.    Informed Consent and Assessment Methods  Explained the crisis assessment process, including applicable information disclosures and limits to confidentiality, assessed understanding of the process, and obtained consent to proceed with the assessment.  Assessment methods included conducting a formal interview with patient, review of medical records, collaboration with medical staff, and obtaining relevant collateral information from family and community providers when available: done     Patient response to interventions: acceptance expressed, verbalizes understanding  Coping skills were attempted to reduce the crisis:  Pt sought help/support from his coordinate care team     History of the Crisis   Pt presented on his own accord, reports he was recommended to seek detox by his Coordinated Care Team at [facility]. Breath alcohol at time of arrival to ED was .233. UDS is positive for cocaine, amphetamines, and cannabis. Per chart, pt has past diagnosis of Bipolar I Disorder, is prescribed medications but reports that he does not take them consistently due to his alcohol use. He has no other  providers but per chart keeps in close contact with coordinate care team. He is here today for help with  his alcohol use. He reports daily use of mouthwash and vodka when he can afford it. He uses IV methamphetamine occasionally. He reports that he is struggling stating his mind is 'fucked up.' He cannot think clearly. He is observed to have underlying irritability. He denies SI/HI/plan/intent. He denies current psychosis symptoms but reports that he occasionally struggles with seeing things and hearing things. He states that he is 'doing alright' right now and is hoping to go to our detox unit.    Pt provides limited history, became irritated easily and became primarily focused on not getting Valium from nurse. Assessment was very brief as pt could not focus; pt was intoxicated. Plan is for detox.     Brief Psychosocial History  Family:  Single, Children no  Support System:  Limited to (coordinated care team)  Employment Status:  disabled  Source of Income:  disability  Financial Environmental Concerns:  No concerns identified  Current Hobbies:   (None identified)  Barriers in Personal Life:  mental health concerns, transportation concerns, financial concerns, lack of companionship, lack of motivation    Significant Clinical History  Current Anxiety Symptoms:  anxious  Current Depression/Trauma:  hoplessness, helplessness, difficulty concentrating, irritable  Current Somatic Symptoms:  anxious  Current Psychosis/Thought Disturbance:  distractability  Current Eating Symptoms:     Chemical Use History:  Alcohol: Daily  Last Use:: 09/11/23  Benzodiazepines: None  Opiates: None  Cocaine: None (Pt denied but UDS positive for cocaine.)  Marijuana: Occasional  Other Use: Methamphetamines  Last Use::  (unknown)   Past diagnosis:  Bipolar Disorder, Substance Use Disorder  Family history:  No known history of mental health or chemical health concerns  Past treatment:  Psychiatric Medication Management, Primary Care, Inpatient Hospitalization, Supportive Living Environment (group home, assisted house, etc)  Details of most recent  treatment:  Pt reports recent move to a 'wet house.' He works with a coordinate care team at [facility], is prescribed psychiatric medication, no other current MH providers. Hx MH admission years ago.     Collateral Information  Is there collateral information: No relevant collateral contacts available.     Risk Assessment  [place] Suicide Severity Rating Scale Full Clinical Version:  Suicidal Ideation  Q1 Wish to be Dead (Lifetime): Yes  Q2 Non-Specific Active Suicidal Thoughts (Lifetime): Yes  3. Active Suicidal Ideation with any Methods (Not Plan) Without Intent to Act (Lifetime): No  Q4 Active Suicidal Ideation with Some Intent to Act, Without Specific Plan (Lifetime): No  Q5 Active Suicidal Ideation with Specific Plan and Intent (Lifetime): No  Q6 Suicide Behavior (Lifetime): no     Suicidal Behavior (Lifetime)  Actual Attempt (Lifetime): No  Has subject engaged in non-suicidal self-injurious behavior? (Lifetime): No  Interrupted Attempts (Lifetime): No  Aborted or Self-Interrupted Attempt (Lifetime): No  Preparatory Acts or Behavior (Lifetime): No    [place] Suicide Severity Rating Scale Recent:   Suicidal Ideation (Recent)  Q1 Wished to be Dead (Past Month): no  Q2 Suicidal Thoughts (Past Month): no  Q3 Suicidal Thought Method: no  Q4 Suicidal Intent without Specific Plan: no  Q5 Suicide Intent with Specific Plan: no  Level of Risk per Screen: low risk  Intensity of Ideation (Recent)  Most Severe Ideation Rating (Past 1 Month):  (n/a)  Description of Most Severe Ideation (Past 1 Month): n/a  Frequency (Past 1 Month):  (n/a)  Duration (Past 1 Month):  (n/a)  Controllability (Past 1 Month):  (n/a)  Deterrents (Past 1 Month): Does not apply  Reasons for Ideation (Past 1 Month): Does not apply  Suicidal Behavior (Recent)  Actual Attempt (Past 3 Months): No  Total Number of Actual Attempts (Past 3 Months):  (n/a)  Actual Attempt Description (Past 3 Months):  (n/a)  Has subject engaged in non-suicidal  self-injurious behavior? (Past 3 Months): No  Interrupted Attempts (Past 3 Months): No  Total Number of Interrupted Attempts (Past 3 Months):  (n/a)  Interrupted Attempt Description (Past 3 Months):  (n/a)  Aborted or Self-Interrupted Attempt (Past 3 Months): No  Total Number of Aborted or Self-Interrupted Attempts (Past 3 Months):  (n/a)  Aborted or Self-Interrupted Attempt Description (Past 3 Months):  (n/a)  Preparatory Acts or Behavior (Past 3 Months): No  Total Number of Preparatory Acts (Past 3 Months):  (n/a)  Preparatory Acts or Behavior Description (Past 3 Months):  (n/a)    Environmental or Psychosocial Events: social isolation, neither working nor attending school, ongoing abuse of substances, excessive debt, poor finances  Protective Factors: Protective Factors: lives in a responsibly safe and stable environment, supportive ongoing medical and mental health care relationships, help seeking    Does the patient have thoughts of harming others? Feels Like Hurting Others: no  Previous Attempt to Hurt Others: no  Is the patient engaging in sexually inappropriate behavior?: no    Is the patient engaging in sexually inappropriate behavior?  no        Mental Status Exam   Affect: Flat  Appearance: Disheveled  Attention Span/Concentration: Inattentive  Eye Contact: Engaged    Fund of Knowledge: Appropriate   Language /Speech Content: Fluent  Language /Speech Volume: Normal  Language /Speech Rate/Productions: Normal  Recent Memory: Variable  Remote Memory: Variable  Mood: Anxious, Irritable  Orientation to Person: Yes   Orientation to Place: Yes  Orientation to Time of Day: Yes  Orientation to Date: Yes     Situation (Do they understand why they are here?): Yes  Psychomotor Behavior: Agitated  Thought Content: Clear  Thought Form: Intact     Medication  Psychotropic medications:   Current Facility-Administered Medications   Medication    cloNIDine (CATAPRES) tablet 0.1 mg    diazepam (VALIUM) tablet 10 mg    Or     diazepam (VALIUM) injection 5-10 mg    flumazenil (ROMAZICON) injection 0.2 mg    folic acid (FOLVITE) tablet 1 mg    OLANZapine zydis (zyPREXA) ODT tab 5-10 mg    Or    haloperidol lactate (HALDOL) injection 2.5-5 mg    multivitamin w/minerals (THERA-VIT-M) tablet 1 tablet    nicotine (COMMIT) lozenge 2 mg    nicotine (COMMIT) lozenge 4 mg    thiamine (B-1) tablet 100 mg     Current Outpatient Medications   Medication    alum & mag hydroxide-simethicone (MYLANTA/MAALOX) 200-200-20 MG/5ML SUSP suspension    divalproex (DEPAKOTE) 500 MG EC tablet    divalproex (DEPAKOTE) 500 MG EC tablet    folic acid (FOLVITE) 1 MG tablet    gabapentin (NEURONTIN) 300 MG capsule    guaiFENesin (ROBITUSSIN) 20 mg/mL SOLN solution    IBUPROFEN PO    loratadine (CLARITIN) 10 MG tablet    lurasidone (LATUDA) 20 MG TABS tablet    mirtazapine (REMERON) 15 MG tablet    multivitamin, therapeutic with minerals (THERA-VIT-M) TABS tablet    nicotine polacrilex 4 MG lozenge    phenol-menthol (CEPASTAT) 14.5 MG lozenge    QUEtiapine (SEROQUEL) 100 MG tablet    QUEtiapine (SEROQUEL) 200 MG tablet    senna-docusate (SENOKOT-S;PERICOLACE) 8.6-50 MG per tablet    thiamine 100 MG tablet     Facility-Administered Medications Ordered in Other Encounters   Medication    Self Administer Medications: Behavioral Services       Current Care Team  Patient Care Team:  No Ref-Primary, Physician as PCP - General    Diagnosis  Patient Active Problem List   Diagnosis Code    Chemical dependency (H) F19.20    Alcohol use, unspecified, uncomplicated F10.90    Unspecified mood (affective) disorder (H) F39       Primary Problem This Admission  Active Hospital Problems    Alcohol use, unspecified, uncomplicated      *Unspecified mood (affective) disorder (H)      Clinical Summary and Substantiation of Recommendations   Pt with history of substance use and Bipolar I Disorder. Today he is intoxicated and seeking detox treatment. He is denying SI/HI/plan/intent; does not  present with any signs of immediate threat of harm to self or others. Recommend admission to detox.    Patient coping skills attempted to reduce the crisis:  Pt sought help/support from his coordinate care team    Disposition  Recommended disposition: Detox        Reviewed case and recommendations with attending provider. Attending Name: Adelita Das       Attending concurs with disposition: yes       Patient and/or validated legal guardian concurs with disposition:   yes       Final disposition:   (detox)    Legal status on admission: Voluntary/Patient has signed consent for treatment    Assessment Details   Total duration spent on the patient case in minutes: 8 min     CPT code(s) utilized: Non-Billable    Yvonne Victor [facility], Psychotherapist  [facility] - Triage & Transition Services  Callback: [phone]

## 2023-09-11 NOTE — ED TRIAGE NOTES
"Pt states \" I've been drinking mouthwash, I need help coming off of mouthwash.\" Pt unable to provide amount of mouthwash he drank. Pt states \"I've been drinking all night long.\" Pt has seizure hisory, last seizure about a month ago per patient report. MARIN 0.233 on admit     Triage Assessment       Row Name 09/11/23 1151       Triage Assessment (Adult)    Airway WDL WDL       Respiratory WDL    Respiratory WDL WDL       Skin Circulation/Temperature WDL    Skin Circulation/Temperature WDL WDL       Cardiac WDL    Cardiac WDL WDL       Peripheral/Neurovascular WDL    Peripheral Neurovascular WDL WDL       Cognitive/Neuro/Behavioral WDL    Cognitive/Neuro/Behavioral WDL WDL                    "

## 2023-09-21 ENCOUNTER — HOSPITAL ENCOUNTER (EMERGENCY)
Facility: CLINIC | Age: 48
Discharge: HOME | End: 2023-09-22
Attending: EMERGENCY MEDICINE | Admitting: EMERGENCY MEDICINE
Payer: COMMERCIAL

## 2023-09-21 ENCOUNTER — TELEPHONE (OUTPATIENT)
Dept: BEHAVIORAL HEALTH | Facility: CLINIC | Age: 48
End: 2023-09-21

## 2023-09-21 DIAGNOSIS — F10.90 ALCOHOL USE DISORDER: ICD-10-CM

## 2023-09-21 DIAGNOSIS — F15.10 METHAMPHETAMINE USE (H): ICD-10-CM

## 2023-09-21 DIAGNOSIS — F10.220 ALCOHOL DEPENDENCE WITH UNCOMPLICATED INTOXICATION (H): Primary | ICD-10-CM

## 2023-09-21 LAB
ALBUMIN SERPL BCG-MCNC: 4.8 G/DL (ref 3.5–5.2)
ALCOHOL BREATH TEST: 0.28 (ref 0–0.01)
ALP SERPL-CCNC: 107 U/L (ref 40–129)
ALT SERPL W P-5'-P-CCNC: 142 U/L (ref 0–70)
AMPHETAMINES UR QL SCN: ABNORMAL
ANION GAP SERPL CALCULATED.3IONS-SCNC: 13 MMOL/L (ref 7–15)
AST SERPL W P-5'-P-CCNC: 101 U/L (ref 0–45)
BARBITURATES UR QL SCN: ABNORMAL
BASOPHILS # BLD AUTO: 0.2 10E3/UL (ref 0–0.2)
BASOPHILS NFR BLD AUTO: 2 %
BENZODIAZ UR QL SCN: ABNORMAL
BILIRUB SERPL-MCNC: 0.2 MG/DL
BUN SERPL-MCNC: 19.8 MG/DL (ref 6–20)
BZE UR QL SCN: ABNORMAL
CALCIUM SERPL-MCNC: 9.1 MG/DL (ref 8.6–10)
CANNABINOIDS UR QL SCN: ABNORMAL
CHLORIDE SERPL-SCNC: 102 MMOL/L (ref 98–107)
CREAT SERPL-MCNC: 0.64 MG/DL (ref 0.67–1.17)
DEPRECATED HCO3 PLAS-SCNC: 26 MMOL/L (ref 22–29)
EGFRCR SERPLBLD CKD-EPI 2021: >90 ML/MIN/1.73M2
EOSINOPHIL # BLD AUTO: 0.2 10E3/UL (ref 0–0.7)
EOSINOPHIL NFR BLD AUTO: 2 %
ERYTHROCYTE [DISTWIDTH] IN BLOOD BY AUTOMATED COUNT: 14.1 % (ref 10–15)
FENTANYL UR QL: ABNORMAL
GLUCOSE SERPL-MCNC: 91 MG/DL (ref 70–99)
HCT VFR BLD AUTO: 44.4 % (ref 40–53)
HGB BLD-MCNC: 15.3 G/DL (ref 13.3–17.7)
IMM GRANULOCYTES # BLD: 0.1 10E3/UL
IMM GRANULOCYTES NFR BLD: 1 %
LYMPHOCYTES # BLD AUTO: 3.8 10E3/UL (ref 0.8–5.3)
LYMPHOCYTES NFR BLD AUTO: 51 %
MCH RBC QN AUTO: 30.4 PG (ref 26.5–33)
MCHC RBC AUTO-ENTMCNC: 34.5 G/DL (ref 31.5–36.5)
MCV RBC AUTO: 88 FL (ref 78–100)
MONOCYTES # BLD AUTO: 0.4 10E3/UL (ref 0–1.3)
MONOCYTES NFR BLD AUTO: 6 %
NEUTROPHILS # BLD AUTO: 2.8 10E3/UL (ref 1.6–8.3)
NEUTROPHILS NFR BLD AUTO: 38 %
NRBC # BLD AUTO: 0 10E3/UL
NRBC BLD AUTO-RTO: 0 /100
OPIATES UR QL SCN: ABNORMAL
PCP QUAL URINE (ROCHE): ABNORMAL
PLATELET # BLD AUTO: 336 10E3/UL (ref 150–450)
POTASSIUM SERPL-SCNC: 4.2 MMOL/L (ref 3.4–5.3)
PROT SERPL-MCNC: 8.3 G/DL (ref 6.4–8.3)
RBC # BLD AUTO: 5.03 10E6/UL (ref 4.4–5.9)
SODIUM SERPL-SCNC: 141 MMOL/L (ref 136–145)
WBC # BLD AUTO: 7.5 10E3/UL (ref 4–11)

## 2023-09-21 PROCEDURE — 99284 EMERGENCY DEPT VISIT MOD MDM: CPT | Performed by: EMERGENCY MEDICINE

## 2023-09-21 PROCEDURE — 250N000013 HC RX MED GY IP 250 OP 250 PS 637: Performed by: EMERGENCY MEDICINE

## 2023-09-21 PROCEDURE — 80053 COMPREHEN METABOLIC PANEL: CPT | Performed by: EMERGENCY MEDICINE

## 2023-09-21 PROCEDURE — 99283 EMERGENCY DEPT VISIT LOW MDM: CPT

## 2023-09-21 PROCEDURE — 85025 COMPLETE CBC W/AUTO DIFF WBC: CPT | Performed by: EMERGENCY MEDICINE

## 2023-09-21 PROCEDURE — 82075 ASSAY OF BREATH ETHANOL: CPT

## 2023-09-21 PROCEDURE — 36415 COLL VENOUS BLD VENIPUNCTURE: CPT | Performed by: EMERGENCY MEDICINE

## 2023-09-21 PROCEDURE — 80307 DRUG TEST PRSMV CHEM ANLYZR: CPT | Performed by: EMERGENCY MEDICINE

## 2023-09-21 RX ORDER — BUPRENORPHINE 10 UG/H
1 PATCH TRANSDERMAL WEEKLY
COMMUNITY
Start: 2023-07-05

## 2023-09-21 RX ORDER — DIAZEPAM 5 MG
5 TABLET ORAL ONCE
Status: COMPLETED | OUTPATIENT
Start: 2023-09-21 | End: 2023-09-21

## 2023-09-21 RX ORDER — POLYETHYLENE GLYCOL 3350 17 G
2 POWDER IN PACKET (EA) ORAL
Status: DISCONTINUED | OUTPATIENT
Start: 2023-09-21 | End: 2023-09-22 | Stop reason: HOSPADM

## 2023-09-21 RX ORDER — CARIPRAZINE 6 MG/1
CAPSULE, GELATIN COATED ORAL
Status: ON HOLD | COMMUNITY
Start: 2023-04-27 | End: 2023-09-24

## 2023-09-21 RX ADMIN — NICOTINE POLACRILEX 2 MG: 2 LOZENGE ORAL at 23:44

## 2023-09-21 RX ADMIN — NICOTINE POLACRILEX 2 MG: 2 LOZENGE ORAL at 20:48

## 2023-09-21 RX ADMIN — DIAZEPAM 5 MG: 5 TABLET ORAL at 21:38

## 2023-09-21 ASSESSMENT — ACTIVITIES OF DAILY LIVING (ADL)
ADLS_ACUITY_SCORE: 35
ADLS_ACUITY_SCORE: 35

## 2023-09-21 NOTE — ED TRIAGE NOTES
"Patient endorses using meth IV and drinking mouthwash. Patient reports he can't quit drinking the mouthwash on his own because he'll be \"shaking like a motherfucker\".      Triage Assessment       Row Name 09/21/23 5312       Triage Assessment (Adult)    Airway WDL WDL       Respiratory WDL    Respiratory WDL WDL       Skin Circulation/Temperature WDL    Skin Circulation/Temperature WDL WDL       Cardiac WDL    Cardiac WDL WDL       Peripheral/Neurovascular WDL    Peripheral Neurovascular WDL WDL       Cognitive/Neuro/Behavioral WDL    Cognitive/Neuro/Behavioral WDL X;mood/behavior    Mood/Behavior excitable                    "

## 2023-09-21 NOTE — ED TRIAGE NOTES
"Patient reports he is trying to get into detox. Patient reports he has been drinking mouthwash. Patient reports he \"needs to come off the mouthwash\". Patient states he \"isn't homeless or anything like that\". Patient asked why he has been drinking mouthwash and he states \"because I get hammered off that stuff, I get drunk!\". Patient states he has been living in \"a wet house with a bunch of drunk indians\". Patient has been laughing uncontrollably and making weird gestures with his arms. Patient reports his last drink was before coming in. Patient clarifies that he only drinks the gold mouthwash. Patient states the mouthwash is like drinking 70 proof liquor.         "

## 2023-09-22 VITALS
SYSTOLIC BLOOD PRESSURE: 148 MMHG | HEART RATE: 125 BPM | DIASTOLIC BLOOD PRESSURE: 80 MMHG | TEMPERATURE: 98.6 F | RESPIRATION RATE: 19 BRPM | OXYGEN SATURATION: 95 %

## 2023-09-22 PROCEDURE — 250N000013 HC RX MED GY IP 250 OP 250 PS 637: Performed by: EMERGENCY MEDICINE

## 2023-09-22 RX ORDER — DIAZEPAM 5 MG
5-20 TABLET ORAL EVERY 30 MIN PRN
Status: DISCONTINUED | OUTPATIENT
Start: 2023-09-22 | End: 2023-09-22 | Stop reason: HOSPADM

## 2023-09-22 RX ADMIN — DIAZEPAM 10 MG: 5 TABLET ORAL at 00:10

## 2023-09-22 ASSESSMENT — ACTIVITIES OF DAILY LIVING (ADL): ADLS_ACUITY_SCORE: 35

## 2023-09-22 NOTE — ED PROVIDER NOTES
ED Provider Note  [facility]      History     Chief Complaint   Patient presents with    Alcohol Problem     Patient reports he has been drinking mouthwash and needs to go to detox to get clean     HPI  Kai Sims is a 47 year old male who presents for alcohol detox.  Drinks a large amount of mouthwash per day.  They have a history of withdrawal symptoms and have had a seizure from alcohol in the past.  Last drink was just prior to arrival.  He also uses IV methamphetamine.  Last use was just prior to arrival.  No suicidal or homicidal ideation.  No recent illness.  No other symptoms noted.     Past Medical History  Past Medical History:   Diagnosis Date    Bipolar affective disorder (H)     Substance abuse      Past Surgical History:   Procedure Laterality Date    ENT SURGERY      ORTHOPEDIC SURGERY       buprenorphine (BUTRANS) 5 MCG/HR WK patch  VRAYLAR 6 MG capsule  alum & mag hydroxide-simethicone (MYLANTA/MAALOX) 200-200-20 MG/5ML SUSP suspension  divalproex (DEPAKOTE) 500 MG EC tablet  divalproex (DEPAKOTE) 500 MG EC tablet  folic acid (FOLVITE) 1 MG tablet  gabapentin (NEURONTIN) 300 MG capsule  guaiFENesin (ROBITUSSIN) 20 mg/mL SOLN solution  IBUPROFEN PO  loratadine (CLARITIN) 10 MG tablet  lurasidone (LATUDA) 20 MG TABS tablet  mirtazapine (REMERON) 15 MG tablet  multivitamin, therapeutic with minerals (THERA-VIT-M) TABS tablet  nicotine polacrilex 4 MG lozenge  phenol-menthol (CEPASTAT) 14.5 MG lozenge  QUEtiapine (SEROQUEL) 100 MG tablet  QUEtiapine (SEROQUEL) 200 MG tablet  senna-docusate (SENOKOT-S;PERICOLACE) 8.6-50 MG per tablet  thiamine 100 MG tablet      No Known Allergies  Family History  Family History   Problem Relation Age of Onset    Substance Abuse Father     Substance Abuse Sister      Social History   Social History     Tobacco Use    Smoking status: Every Day     Packs/day: 1.00     Types: Cigarettes   Substance Use Topics    Alcohol use: Yes     Comment:  1L vodka daily    Drug use: Yes     Types: Marijuana, Methamphetamines     Comment: Clean for 15 days      Past medical history, past surgical history, medications, allergies, family history, and social history were reviewed with the patient. No additional pertinent items.      A medically appropriate review of systems was performed with pertinent positives and negatives noted in the HPI, and all other systems negative.    Physical Exam   BP: (!) 138/97  Pulse: 108  Temp: 98.2  F (36.8  C)  Resp: 20  SpO2: 100 %  Physical Exam  Vitals and nursing note reviewed.   Constitutional:       General: He is not in acute distress.     Appearance: Normal appearance. He is not toxic-appearing.      Comments: Appears intoxicated.   HENT:      Head: Atraumatic.   Eyes:      General: No scleral icterus.     Conjunctiva/sclera: Conjunctivae normal.   Cardiovascular:      Rate and Rhythm: Normal rate.      Heart sounds: Normal heart sounds.   Pulmonary:      Effort: Pulmonary effort is normal. No respiratory distress.      Breath sounds: Normal breath sounds.   Abdominal:      Palpations: Abdomen is soft.      Tenderness: There is no abdominal tenderness.   Musculoskeletal:         General: No deformity.      Cervical back: Neck supple.   Skin:     General: Skin is warm.   Neurological:      Mental Status: He is alert.   Psychiatric:         Thought Content: Thought content does not include homicidal or suicidal ideation.           ED Course, Procedures, & Data      Procedures          Mental Health Risk Assessment        PSS-3      Date and Time Over the past 2 weeks have you felt down, depressed, or hopeless? Over the past 2 weeks have you had thoughts of killing yourself? Have you ever attempted to kill yourself? When did this last happen? User   09/21/23 1740 yes no yes between 1 and 6 months ago MEM                       Results for orders placed or performed during the hospital encounter of 09/21/23   Comprehensive metabolic  panel     Status: Abnormal   Result Value Ref Range    Sodium 141 136 - 145 mmol/L    Potassium 4.2 3.4 - 5.3 mmol/L    Chloride 102 98 - 107 mmol/L    Carbon Dioxide (CO2) 26 22 - 29 mmol/L    Anion Gap 13 7 - 15 mmol/L    Urea Nitrogen 19.8 6.0 - 20.0 mg/dL    Creatinine 0.64 (L) 0.67 - 1.17 mg/dL    Calcium 9.1 8.6 - 10.0 mg/dL    Glucose 91 70 - 99 mg/dL    Alkaline Phosphatase 107 40 - 129 U/L     (H) 0 - 45 U/L     (H) 0 - 70 U/L    Protein Total 8.3 6.4 - 8.3 g/dL    Albumin 4.8 3.5 - 5.2 g/dL    Bilirubin Total 0.2 <=1.2 mg/dL    GFR Estimate >90 >60 mL/min/1.73m2   CBC with platelets and differential     Status: None   Result Value Ref Range    WBC Count 7.5 4.0 - 11.0 10e3/uL    RBC Count 5.03 4.40 - 5.90 10e6/uL    Hemoglobin 15.3 13.3 - 17.7 g/dL    Hematocrit 44.4 40.0 - 53.0 %    MCV 88 78 - 100 fL    MCH 30.4 26.5 - 33.0 pg    MCHC 34.5 31.5 - 36.5 g/dL    RDW 14.1 10.0 - 15.0 %    Platelet Count 336 150 - 450 10e3/uL    % Neutrophils 38 %    % Lymphocytes 51 %    % Monocytes 6 %    % Eosinophils 2 %    % Basophils 2 %    % Immature Granulocytes 1 %    NRBCs per 100 WBC 0 <1 /100    Absolute Neutrophils 2.8 1.6 - 8.3 10e3/uL    Absolute Lymphocytes 3.8 0.8 - 5.3 10e3/uL    Absolute Monocytes 0.4 0.0 - 1.3 10e3/uL    Absolute Eosinophils 0.2 0.0 - 0.7 10e3/uL    Absolute Basophils 0.2 0.0 - 0.2 10e3/uL    Absolute Immature Granulocytes 0.1 <=0.4 10e3/uL    Absolute NRBCs 0.0 10e3/uL   Drug Abuse Screen Qual Urine     Status: Abnormal   Result Value Ref Range    Amphetamines Urine Screen Positive (A) Screen Negative    Barbituates Urine Screen Negative Screen Negative    Benzodiazepine Urine Screen Negative Screen Negative    Cannabinoids Urine Screen Positive (A) Screen Negative    Cocaine Urine Screen Negative Screen Negative    Fentanyl Qual Urine Screen Negative Screen Negative    Opiates Urine Screen Negative Screen Negative    PCP Urine Screen Negative Screen Negative   Alcohol  breath test POCT     Status: Abnormal   Result Value Ref Range    Alcohol Breath Test 0.285 (A) 0.00 - 0.01   CBC with platelets differential     Status: None    Narrative    The following orders were created for panel order CBC with platelets differential.  Procedure                               Abnormality         Status                     ---------                               -----------         ------                     CBC with platelets and d...[[id number]]                      Final result                 Please view results for these tests on the individual orders.   Urine Drugs of Abuse Screen     Status: Abnormal    Narrative    The following orders were created for panel order Urine Drugs of Abuse Screen.  Procedure                               Abnormality         Status                     ---------                               -----------         ------                     Drug Abuse Screen Qual U...[[id number]]  Abnormal            Final result                 Please view results for these tests on the individual orders.     Medications   nicotine (COMMIT) lozenge 2 mg (2 mg Buccal $Given 9/21/23 2048)   diazepam (VALIUM) tablet 5 mg (5 mg Oral $Given 9/21/23 2138)     Labs Ordered and Resulted from Time of ED Arrival to Time of ED Departure   COMPREHENSIVE METABOLIC PANEL - Abnormal       Result Value    Sodium 141      Potassium 4.2      Chloride 102      Carbon Dioxide (CO2) 26      Anion Gap 13      Urea Nitrogen 19.8      Creatinine 0.64 (*)     Calcium 9.1      Glucose 91      Alkaline Phosphatase 107       (*)      (*)     Protein Total 8.3      Albumin 4.8      Bilirubin Total 0.2      GFR Estimate >90     DRUG ABUSE SCREEN QUAL URINE - Abnormal    Amphetamines Urine Screen Positive (*)     Barbituates Urine Screen Negative      Benzodiazepine Urine Screen Negative      Cannabinoids Urine Screen Positive (*)     Cocaine Urine Screen Negative      Fentanyl Qual Urine Screen  Negative      Opiates Urine Screen Negative      PCP Urine Screen Negative     ALCOHOL BREATH TEST POCT - Abnormal    Alcohol Breath Test 0.285 (*)    CBC WITH PLATELETS AND DIFFERENTIAL    WBC Count 7.5      RBC Count 5.03      Hemoglobin 15.3      Hematocrit 44.4      MCV 88      MCH 30.4      MCHC 34.5      RDW 14.1      Platelet Count 336      % Neutrophils 38      % Lymphocytes 51      % Monocytes 6      % Eosinophils 2      % Basophils 2      % Immature Granulocytes 1      NRBCs per 100 WBC 0      Absolute Neutrophils 2.8      Absolute Lymphocytes 3.8      Absolute Monocytes 0.4      Absolute Eosinophils 0.2      Absolute Basophils 0.2      Absolute Immature Granulocytes 0.1      Absolute NRBCs 0.0       No orders to display              Assessment & Plan    This is a 47 year old male who presents for alcohol detox.  Last alcohol intake was just prior to arrival.  Alcohol level is 0.285.  Exam demonstrates appearance of intoxication.  Patient was started on MSSA protocol.  We will admit for detox.     Prior to admission to detox patient requested discharge home.  Patient is clinically sober.  We will discharge with return precautions.    I have reviewed the nursing notes. I have reviewed the findings, diagnosis, plan and need for follow up with the patient.    New Prescriptions    No medications on file       Final diagnoses:   Alcohol use disorder   Methamphetamine use (H)       Logan Nash DO  [facility] EMERGENCY DEPARTMENT  9/21/2023            Logan Nash,   09/22/23 0132

## 2023-09-22 NOTE — ED NOTES
Pt decided not to stay for detox placement and wanted to go home. Provider deemed pt clinically sober. Pt is able to ambulate in a safe matter and able to make coherent rational decision.

## 2023-09-22 NOTE — TELEPHONE ENCOUNTER
S: [facility] , Provider Charity calling at 9:27 PM with clinical on a 47 year old/Female presenting for alcohol detox.     B: Pt presents for ETOH detox.   Currently reports drinking mouthwash daily amount not provided.  Patient reports last use was Upon Arrival  Pt MARIN: 0.285  Pt  Unknown hx of DT  Pt  endorses hx of seizures. Last seizure:  Possibly 1 week ago.  Pt endorsing the following symptoms of withdrawal:  N/A  MSSA Score: 8    Pt denies acute mental health or medical concerns.   Pt denies other drug use: Alcohol Amount/frequency:  Daily    Does Pt have a detox care plan in [facility]? No  Does pt present with specific needs, assistive devices, or exclusionary criteria? None  Is the patient ambulating, eating and drinking in the ED? Yes, ambulating not currently eating or drinking. However no concerns regarding food Intake.    A: Pt meets criteria to be presented for IP detox admission. Patient is voluntary    COVID Symptoms: No  If yes, COVID test required   Utox: Positive for Amphetamines  CMP: Abnormalities: Creatinine 0.64, ,   CBC: WNL  HCG: N/A     R: Patient cleared and ready for behavioral bed placement: Yes    Pt is meeting criteria for presentation to 3A/CD    Does Patient need a Transfer Center request created? No, Pt is located within [facility] ED, [facility] ED, or [place] [facility]     9:37 PM Paged Dr. Lange.    10:05 PM Called [facility].    10:44 PM Patient accepted to unit 3A/CD/Dr. Patel    10:54 PM CRN on unit 3A informed of patient being in queue advised will need to contact staffing.    11:29 PM CRN    11:30 PM ED notified

## 2023-09-24 ENCOUNTER — HOSPITAL ENCOUNTER (INPATIENT)
Facility: CLINIC | Age: 48
LOS: 2 days | Discharge: LEFT BEFORE DISCHARGE | DRG: 894 | End: 2023-09-26
Attending: INTERNAL MEDICINE | Admitting: PSYCHIATRY & NEUROLOGY
Payer: COMMERCIAL

## 2023-09-24 ENCOUNTER — TELEPHONE (OUTPATIENT)
Dept: BEHAVIORAL HEALTH | Facility: CLINIC | Age: 48
End: 2023-09-24

## 2023-09-24 DIAGNOSIS — F10.220 ALCOHOL DEPENDENCE WITH UNCOMPLICATED INTOXICATION (H): ICD-10-CM

## 2023-09-24 DIAGNOSIS — F10.129 ALCOHOL ABUSE WITH INTOXICATION (H): ICD-10-CM

## 2023-09-24 PROBLEM — F10.939 ALCOHOL WITHDRAWAL (H): Status: ACTIVE | Noted: 2023-09-24

## 2023-09-24 LAB
ALBUMIN SERPL BCG-MCNC: 4.5 G/DL (ref 3.5–5.2)
ALCOHOL BREATH TEST: 0.26 (ref 0–0.01)
ALP SERPL-CCNC: 130 U/L (ref 40–129)
ALT SERPL W P-5'-P-CCNC: 184 U/L (ref 0–70)
AMPHETAMINES UR QL SCN: ABNORMAL
ANION GAP SERPL CALCULATED.3IONS-SCNC: 15 MMOL/L (ref 7–15)
AST SERPL W P-5'-P-CCNC: 236 U/L (ref 0–45)
BARBITURATES UR QL SCN: ABNORMAL
BASOPHILS # BLD AUTO: 0.1 10E3/UL (ref 0–0.2)
BASOPHILS NFR BLD AUTO: 1 %
BENZODIAZ UR QL SCN: ABNORMAL
BILIRUB SERPL-MCNC: <0.2 MG/DL
BUN SERPL-MCNC: 18.6 MG/DL (ref 6–20)
BZE UR QL SCN: ABNORMAL
CALCIUM SERPL-MCNC: 9 MG/DL (ref 8.6–10)
CANNABINOIDS UR QL SCN: ABNORMAL
CHLORIDE SERPL-SCNC: 105 MMOL/L (ref 98–107)
CREAT SERPL-MCNC: 0.73 MG/DL (ref 0.67–1.17)
DEPRECATED HCO3 PLAS-SCNC: 22 MMOL/L (ref 22–29)
EGFRCR SERPLBLD CKD-EPI 2021: >90 ML/MIN/1.73M2
EOSINOPHIL # BLD AUTO: 0.2 10E3/UL (ref 0–0.7)
EOSINOPHIL NFR BLD AUTO: 4 %
ERYTHROCYTE [DISTWIDTH] IN BLOOD BY AUTOMATED COUNT: 13.6 % (ref 10–15)
FENTANYL UR QL: ABNORMAL
GGT SERPL-CCNC: 178 U/L (ref 8–61)
GLUCOSE SERPL-MCNC: 95 MG/DL (ref 70–99)
HBA1C MFR BLD: 5.4 %
HCT VFR BLD AUTO: 43.7 % (ref 40–53)
HGB BLD-MCNC: 14.9 G/DL (ref 13.3–17.7)
IMM GRANULOCYTES # BLD: 0 10E3/UL
IMM GRANULOCYTES NFR BLD: 1 %
LYMPHOCYTES # BLD AUTO: 3.2 10E3/UL (ref 0.8–5.3)
LYMPHOCYTES NFR BLD AUTO: 50 %
MCH RBC QN AUTO: 30.5 PG (ref 26.5–33)
MCHC RBC AUTO-ENTMCNC: 34.1 G/DL (ref 31.5–36.5)
MCV RBC AUTO: 89 FL (ref 78–100)
MONOCYTES # BLD AUTO: 0.4 10E3/UL (ref 0–1.3)
MONOCYTES NFR BLD AUTO: 6 %
NEUTROPHILS # BLD AUTO: 2.3 10E3/UL (ref 1.6–8.3)
NEUTROPHILS NFR BLD AUTO: 38 %
NRBC # BLD AUTO: 0 10E3/UL
NRBC BLD AUTO-RTO: 0 /100
OPIATES UR QL SCN: ABNORMAL
PCP QUAL URINE (ROCHE): ABNORMAL
PLATELET # BLD AUTO: 345 10E3/UL (ref 150–450)
POTASSIUM SERPL-SCNC: 4.1 MMOL/L (ref 3.4–5.3)
PROT SERPL-MCNC: 7.7 G/DL (ref 6.4–8.3)
RBC # BLD AUTO: 4.89 10E6/UL (ref 4.4–5.9)
SODIUM SERPL-SCNC: 142 MMOL/L (ref 136–145)
TSH SERPL DL<=0.005 MIU/L-ACNC: 0.38 UIU/ML (ref 0.3–4.2)
WBC # BLD AUTO: 6.3 10E3/UL (ref 4–11)

## 2023-09-24 PROCEDURE — 250N000013 HC RX MED GY IP 250 OP 250 PS 637: Performed by: INTERNAL MEDICINE

## 2023-09-24 PROCEDURE — 80053 COMPREHEN METABOLIC PANEL: CPT | Performed by: FAMILY MEDICINE

## 2023-09-24 PROCEDURE — 250N000013 HC RX MED GY IP 250 OP 250 PS 637: Performed by: PSYCHIATRY & NEUROLOGY

## 2023-09-24 PROCEDURE — 85025 COMPLETE CBC W/AUTO DIFF WBC: CPT | Performed by: INTERNAL MEDICINE

## 2023-09-24 PROCEDURE — 128N000004 HC R&B CD ADULT

## 2023-09-24 PROCEDURE — 82075 ASSAY OF BREATH ETHANOL: CPT | Performed by: INTERNAL MEDICINE

## 2023-09-24 PROCEDURE — 82977 ASSAY OF GGT: CPT | Performed by: PSYCHIATRY & NEUROLOGY

## 2023-09-24 PROCEDURE — 99285 EMERGENCY DEPT VISIT HI MDM: CPT | Performed by: INTERNAL MEDICINE

## 2023-09-24 PROCEDURE — 83036 HEMOGLOBIN GLYCOSYLATED A1C: CPT | Performed by: PSYCHIATRY & NEUROLOGY

## 2023-09-24 PROCEDURE — 36415 COLL VENOUS BLD VENIPUNCTURE: CPT | Performed by: FAMILY MEDICINE

## 2023-09-24 PROCEDURE — 84443 ASSAY THYROID STIM HORMONE: CPT | Performed by: PSYCHIATRY & NEUROLOGY

## 2023-09-24 PROCEDURE — 80307 DRUG TEST PRSMV CHEM ANLYZR: CPT | Performed by: INTERNAL MEDICINE

## 2023-09-24 RX ORDER — NALOXONE HYDROCHLORIDE 0.4 MG/ML
0.2 INJECTION, SOLUTION INTRAMUSCULAR; INTRAVENOUS; SUBCUTANEOUS
Status: DISCONTINUED | OUTPATIENT
Start: 2023-09-24 | End: 2023-09-26 | Stop reason: HOSPADM

## 2023-09-24 RX ORDER — HYDROXYZINE HYDROCHLORIDE 25 MG/1
25 TABLET, FILM COATED ORAL EVERY 4 HOURS PRN
Status: DISCONTINUED | OUTPATIENT
Start: 2023-09-24 | End: 2023-09-26 | Stop reason: HOSPADM

## 2023-09-24 RX ORDER — ONDANSETRON 4 MG/1
4 TABLET, ORALLY DISINTEGRATING ORAL EVERY 6 HOURS PRN
Status: DISCONTINUED | OUTPATIENT
Start: 2023-09-24 | End: 2023-09-26 | Stop reason: HOSPADM

## 2023-09-24 RX ORDER — GUAIFENESIN/DEXTROMETHORPHAN 100-10MG/5
10 SYRUP ORAL EVERY 4 HOURS PRN
Status: DISCONTINUED | OUTPATIENT
Start: 2023-09-24 | End: 2023-09-26 | Stop reason: HOSPADM

## 2023-09-24 RX ORDER — FOLIC ACID 1 MG/1
1 TABLET ORAL DAILY
Status: DISCONTINUED | OUTPATIENT
Start: 2023-09-24 | End: 2023-09-24

## 2023-09-24 RX ORDER — LOPERAMIDE HCL 2 MG
2 CAPSULE ORAL 4 TIMES DAILY PRN
Status: DISCONTINUED | OUTPATIENT
Start: 2023-09-24 | End: 2023-09-26 | Stop reason: HOSPADM

## 2023-09-24 RX ORDER — OLANZAPINE 10 MG/1
10 TABLET ORAL 3 TIMES DAILY PRN
Status: DISCONTINUED | OUTPATIENT
Start: 2023-09-24 | End: 2023-09-25

## 2023-09-24 RX ORDER — LURASIDONE HYDROCHLORIDE 20 MG/1
20 TABLET, FILM COATED ORAL DAILY
Status: DISCONTINUED | OUTPATIENT
Start: 2023-09-25 | End: 2023-09-26 | Stop reason: HOSPADM

## 2023-09-24 RX ORDER — LORATADINE 10 MG/1
10 TABLET ORAL DAILY
Status: DISCONTINUED | OUTPATIENT
Start: 2023-09-25 | End: 2023-09-26 | Stop reason: HOSPADM

## 2023-09-24 RX ORDER — NALOXONE HYDROCHLORIDE 0.4 MG/ML
0.4 INJECTION, SOLUTION INTRAMUSCULAR; INTRAVENOUS; SUBCUTANEOUS
Status: DISCONTINUED | OUTPATIENT
Start: 2023-09-24 | End: 2023-09-26 | Stop reason: HOSPADM

## 2023-09-24 RX ORDER — OLANZAPINE 10 MG/2ML
10 INJECTION, POWDER, FOR SOLUTION INTRAMUSCULAR 3 TIMES DAILY PRN
Status: DISCONTINUED | OUTPATIENT
Start: 2023-09-24 | End: 2023-09-25

## 2023-09-24 RX ORDER — GABAPENTIN 300 MG/1
300 CAPSULE ORAL 3 TIMES DAILY
Status: DISCONTINUED | OUTPATIENT
Start: 2023-09-24 | End: 2023-09-25

## 2023-09-24 RX ORDER — AMOXICILLIN 250 MG
1 CAPSULE ORAL 2 TIMES DAILY PRN
Status: DISCONTINUED | OUTPATIENT
Start: 2023-09-24 | End: 2023-09-26 | Stop reason: HOSPADM

## 2023-09-24 RX ORDER — POLYETHYLENE GLYCOL 3350 17 G
2 POWDER IN PACKET (EA) ORAL
Status: DISCONTINUED | OUTPATIENT
Start: 2023-09-24 | End: 2023-09-24

## 2023-09-24 RX ORDER — MULTIPLE VITAMINS W/ MINERALS TAB 9MG-400MCG
1 TAB ORAL DAILY
Status: DISCONTINUED | OUTPATIENT
Start: 2023-09-24 | End: 2023-09-24

## 2023-09-24 RX ORDER — TRAZODONE HYDROCHLORIDE 50 MG/1
50 TABLET, FILM COATED ORAL
Status: DISCONTINUED | OUTPATIENT
Start: 2023-09-24 | End: 2023-09-26 | Stop reason: HOSPADM

## 2023-09-24 RX ORDER — FOLIC ACID 1 MG/1
1 TABLET ORAL DAILY
Status: DISCONTINUED | OUTPATIENT
Start: 2023-09-24 | End: 2023-09-26 | Stop reason: HOSPADM

## 2023-09-24 RX ORDER — BUPRENORPHINE 10 UG/H
1 PATCH TRANSDERMAL WEEKLY
Status: DISCONTINUED | OUTPATIENT
Start: 2023-09-24 | End: 2023-09-26 | Stop reason: HOSPADM

## 2023-09-24 RX ORDER — DIAZEPAM 5 MG
5-20 TABLET ORAL EVERY 30 MIN PRN
Status: DISCONTINUED | OUTPATIENT
Start: 2023-09-24 | End: 2023-09-26 | Stop reason: HOSPADM

## 2023-09-24 RX ORDER — ATENOLOL 50 MG/1
50 TABLET ORAL DAILY PRN
Status: DISCONTINUED | OUTPATIENT
Start: 2023-09-24 | End: 2023-09-26 | Stop reason: HOSPADM

## 2023-09-24 RX ORDER — MULTIPLE VITAMINS W/ MINERALS TAB 9MG-400MCG
1 TAB ORAL DAILY
Status: DISCONTINUED | OUTPATIENT
Start: 2023-09-24 | End: 2023-09-26 | Stop reason: HOSPADM

## 2023-09-24 RX ORDER — MAGNESIUM HYDROXIDE/ALUMINUM HYDROXICE/SIMETHICONE 120; 1200; 1200 MG/30ML; MG/30ML; MG/30ML
30 SUSPENSION ORAL EVERY 4 HOURS PRN
Status: DISCONTINUED | OUTPATIENT
Start: 2023-09-24 | End: 2023-09-26 | Stop reason: HOSPADM

## 2023-09-24 RX ORDER — ACETAMINOPHEN 325 MG/1
650 TABLET ORAL EVERY 4 HOURS PRN
Status: DISCONTINUED | OUTPATIENT
Start: 2023-09-24 | End: 2023-09-26 | Stop reason: HOSPADM

## 2023-09-24 RX ORDER — DIAZEPAM 5 MG
5-20 TABLET ORAL EVERY 30 MIN PRN
Status: DISCONTINUED | OUTPATIENT
Start: 2023-09-24 | End: 2023-09-24

## 2023-09-24 RX ORDER — MIRTAZAPINE 15 MG/1
15 TABLET, FILM COATED ORAL AT BEDTIME
Status: DISCONTINUED | OUTPATIENT
Start: 2023-09-24 | End: 2023-09-26 | Stop reason: HOSPADM

## 2023-09-24 RX ORDER — IBUPROFEN 200 MG
200-400 TABLET ORAL EVERY 6 HOURS PRN
Status: DISCONTINUED | OUTPATIENT
Start: 2023-09-24 | End: 2023-09-26 | Stop reason: HOSPADM

## 2023-09-24 RX ADMIN — NICOTINE POLACRILEX 2 MG: 2 LOZENGE ORAL at 16:00

## 2023-09-24 RX ADMIN — DIAZEPAM 10 MG: 5 TABLET ORAL at 22:33

## 2023-09-24 RX ADMIN — THIAMINE HCL TAB 100 MG 100 MG: 100 TAB at 15:00

## 2023-09-24 RX ADMIN — DIAZEPAM 10 MG: 5 TABLET ORAL at 18:41

## 2023-09-24 RX ADMIN — MULTIPLE VITAMINS W/ MINERALS TAB 1 TABLET: TAB at 15:00

## 2023-09-24 RX ADMIN — NICOTINE POLACRILEX 2 MG: 2 LOZENGE ORAL at 18:41

## 2023-09-24 RX ADMIN — DIAZEPAM 10 MG: 5 TABLET ORAL at 15:00

## 2023-09-24 RX ADMIN — NICOTINE POLACRILEX 8 MG: 4 LOZENGE ORAL at 19:28

## 2023-09-24 RX ADMIN — MIRTAZAPINE 15 MG: 15 TABLET, FILM COATED ORAL at 22:33

## 2023-09-24 RX ADMIN — FOLIC ACID 1 MG: 1 TABLET ORAL at 15:00

## 2023-09-24 RX ADMIN — NICOTINE POLACRILEX 2 MG: 2 LOZENGE ORAL at 14:44

## 2023-09-24 RX ADMIN — GABAPENTIN 300 MG: 300 CAPSULE ORAL at 22:34

## 2023-09-24 ASSESSMENT — ACTIVITIES OF DAILY LIVING (ADL)
HYGIENE/GROOMING: INDEPENDENT
ADLS_ACUITY_SCORE: 28
DRESS: INDEPENDENT
DIFFICULTY_EATING/SWALLOWING: NO
DOING_ERRANDS_INDEPENDENTLY_DIFFICULTY: NO
DRESSING/BATHING_DIFFICULTY: NO
CHANGE_IN_FUNCTIONAL_STATUS_SINCE_ONSET_OF_CURRENT_ILLNESS/INJURY: NO
WALKING_OR_CLIMBING_STAIRS_DIFFICULTY: NO
FALL_HISTORY_WITHIN_LAST_SIX_MONTHS: NO
ADLS_ACUITY_SCORE: 28
WEAR_GLASSES_OR_BLIND: NO
ADLS_ACUITY_SCORE: 35
ADLS_ACUITY_SCORE: 33
ORAL_HYGIENE: INDEPENDENT
TOILETING_ISSUES: NO
ADLS_ACUITY_SCORE: 28
CONCENTRATING,_REMEMBERING_OR_MAKING_DECISIONS_DIFFICULTY: NO

## 2023-09-24 NOTE — TELEPHONE ENCOUNTER
S: [facility] , Provider Luisana calling at 3:53PM with clinical on a 47 year old/Male presenting for alcohol detox.     B: Pt presents for ETOH detox.   Currently reports drinking a bottle of Listerine a day for the past 5 years.    Patient reports last use was earlier today.  Pt MARIN: 0.258  Pt  denies hx of DT  Pt  endorses hx of seizures. Last seizure:  Pt reports his most recent seizure was 3 days ago.  Pt came to the ED that day for detox but decided to discharge.  Pt endorsing the following symptoms of withdrawal: Shaky  MSSA Score: None yet    Pt denies acute mental health or medical concerns.   Pt denies other drug use: None Amount/frequency: N/A    Does Pt have a detox care plan in [facility]? No  Does pt present with specific needs, assistive devices, or exclusionary criteria? None  Is the patient ambulating, eating and drinking in the ED? Yes    A: Pt meets criteria to be presented for IP detox admission. Patient is voluntary    COVID Symptoms: No  If yes, COVID test required   Utox: Positive for Benzos, Cannabis  CMP: Abnormalities: Alkaline Phosphatase 130, ,   CBC: WNL  HCG: N/A     R: Patient cleared and ready for behavioral bed placement: Yes    Pt is meeting criteria for presentation to 3A/CD    Does Patient need a Transfer Center request created? No, Pt is located within [facility] ED, [facility] ED, or [place] [facility]         4:29PM Dr Francisco accepts pt for 3A/[facility]    Pt added to unit queue, unit called with disposition    4:42PM ED updated with pt placement

## 2023-09-24 NOTE — PROGRESS NOTES
09/24/23 1827   Patient Belongings   Did you bring any home meds/supplements to the hospital?  No   Patient Belongings locker   Patient Belongings Put in Hospital Secure Location (Security or Locker, etc.) other (see comments)   Belongings Search Yes   Clothing Search Yes   Second Staff Kevin MONET  &Jason FRIEDMAN   Comment See note     Storage Bin: raymond balderas/miguel christensen, [facility]   Medical Room Bin: [place] EBT   ADMISSION:  I am responsible for any personal items that are not sent to the safe or pharmacy. [facility] is not responsible for loss, theft or damage of any property in my possession.    Patient Signature _________________________________________ Date/Time _____________________    Staff Signature ___________________________________________ Date/Time _____________________    2nd Staff person, if patient is unable/unwilling to sign    ________________________________________________________ Date/Time _____________________    DISCHARGE:  All personal items have been returned to me.    Patient Signature _________________________________________ Date/Time _____________________    Staff Signature ___________________________________________ Date/Time _____________________

## 2023-09-24 NOTE — ED TRIAGE NOTES
Triage Assessment       Row Name 09/24/23 8589       Triage Assessment (Adult)    Airway WDL WDL       Respiratory WDL    Respiratory WDL WDL       Skin Circulation/Temperature WDL    Skin Circulation/Temperature WDL WDL       Cardiac WDL    Cardiac WDL WDL       Peripheral/Neurovascular WDL    Peripheral Neurovascular WDL WDL       Cognitive/Neuro/Behavioral WDL    Cognitive/Neuro/Behavioral WDL X;motor response  delayed, intoxicated    Level of Consciousness intermittent confusion    Mood/Behavior excitable;restless       [place] Coma Scale    Best Eye Response 4-->(E4) spontaneous    Best Motor Response 6-->(M6) obeys commands    Best Verbal Response 5-->(V5) oriented    [place] Coma Scale Score 15

## 2023-09-24 NOTE — ED PROVIDER NOTES
ED Provider Note  [facility]      History     Chief Complaint   Patient presents with    Alcohol Intoxication     Pt is intoxicated and has history of seizures and would like detox.       HPI  Kai Sims is a 47 year old male with a past medical history of polysubstance abuse, bipolar 1, and Wernicke-Korsakoff syndrome who presents to the Emergency Department seeking detox from alcohol. He reports that he has been drinking multiple bottles of mouthwash on a daily basis for 5+ years now. He also notes a history of seizures, and states that he had one last night.       Past Medical History  Past Medical History:   Diagnosis Date    Bipolar affective disorder (H)     Substance abuse (H)      Past Surgical History:   Procedure Laterality Date    ENT SURGERY      ORTHOPEDIC SURGERY       gabapentin (NEURONTIN) 300 MG capsule  multivitamin, therapeutic with minerals (THERA-VIT-M) TABS tablet  buprenorphine (BUTRANS) 5 MCG/HR WK patch  folic acid (FOLVITE) 1 MG tablet  guaiFENesin (ROBITUSSIN) 20 mg/mL SOLN solution  IBUPROFEN PO  loratadine (CLARITIN) 10 MG tablet  nicotine polacrilex 4 MG lozenge  QUEtiapine (SEROQUEL) 100 MG tablet  QUEtiapine (SEROQUEL) 200 MG tablet  senna-docusate (SENOKOT-S;PERICOLACE) 8.6-50 MG per tablet  thiamine 100 MG tablet  VRAYLAR 6 MG capsule      No Known Allergies  Family History  Family History   Problem Relation Age of Onset    Substance Abuse Father     Substance Abuse Sister      Social History   Social History     Tobacco Use    Smoking status: Every Day     Packs/day: 1.00     Types: Cigarettes   Substance Use Topics    Alcohol use: Yes     Comment: 1L vodka daily    Drug use: Yes     Types: Marijuana, Methamphetamines     Comment: Clean for 15 days      Past medical history, past surgical history, medications, allergies, family history, and social history were reviewed with the patient. No additional pertinent items.      A medically appropriate  "review of systems was performed with pertinent positives and negatives noted in the HPI, and all other systems negative.    Physical Exam   BP: (!) 145/96  Pulse: 106  Temp: 97.4  F (36.3  C)  Resp: 16  Height: 170.2 cm (5' 7\")  Weight: 62.1 kg (136 lb 14.4 oz)  SpO2: 97 %  Physical Exam  Constitutional:       General: He is not in acute distress.     Appearance: He is not diaphoretic.   HENT:      Head: Atraumatic.   Eyes:      General: No scleral icterus.     Pupils: Pupils are equal, round, and reactive to light.   Cardiovascular:      Rate and Rhythm: Normal rate and regular rhythm.      Heart sounds: Normal heart sounds. No murmur heard.     No friction rub. No gallop.   Pulmonary:      Effort: Pulmonary effort is normal. No respiratory distress.      Breath sounds: Normal breath sounds. No stridor. No wheezing, rhonchi or rales.   Chest:      Chest wall: No tenderness.   Abdominal:      General: Abdomen is flat. Bowel sounds are normal. There is no distension.      Palpations: Abdomen is soft. There is no mass.      Tenderness: There is no abdominal tenderness. There is no right CVA tenderness, left CVA tenderness, guarding or rebound.      Hernia: No hernia is present.   Musculoskeletal:         General: No tenderness.      Cervical back: Neck supple.   Skin:     General: Skin is warm.      Findings: No rash.   Neurological:      General: No focal deficit present.      Cranial Nerves: No cranial nerve deficit.   Psychiatric:         Mood and Affect: Mood normal.         Thought Content: Thought content normal.           ED Course, Procedures, & Data      Procedures          Results for orders placed or performed during the hospital encounter of 09/24/23   Comprehensive metabolic panel     Status: Abnormal   Result Value Ref Range    Sodium 142 136 - 145 mmol/L    Potassium 4.1 3.4 - 5.3 mmol/L    Chloride 105 98 - 107 mmol/L    Carbon Dioxide (CO2) 22 22 - 29 mmol/L    Anion Gap 15 7 - 15 mmol/L    Urea " Nitrogen 18.6 6.0 - 20.0 mg/dL    Creatinine 0.73 0.67 - 1.17 mg/dL    Calcium 9.0 8.6 - 10.0 mg/dL    Glucose 95 70 - 99 mg/dL    Alkaline Phosphatase 130 (H) 40 - 129 U/L     (H) 0 - 45 U/L     (H) 0 - 70 U/L    Protein Total 7.7 6.4 - 8.3 g/dL    Albumin 4.5 3.5 - 5.2 g/dL    Bilirubin Total <0.2 <=1.2 mg/dL    GFR Estimate >90 >60 mL/min/1.73m2   CBC with platelets and differential     Status: None   Result Value Ref Range    WBC Count 6.3 4.0 - 11.0 10e3/uL    RBC Count 4.89 4.40 - 5.90 10e6/uL    Hemoglobin 14.9 13.3 - 17.7 g/dL    Hematocrit 43.7 40.0 - 53.0 %    MCV 89 78 - 100 fL    MCH 30.5 26.5 - 33.0 pg    MCHC 34.1 31.5 - 36.5 g/dL    RDW 13.6 10.0 - 15.0 %    Platelet Count 345 150 - 450 10e3/uL    % Neutrophils 38 %    % Lymphocytes 50 %    % Monocytes 6 %    % Eosinophils 4 %    % Basophils 1 %    % Immature Granulocytes 1 %    NRBCs per 100 WBC 0 <1 /100    Absolute Neutrophils 2.3 1.6 - 8.3 10e3/uL    Absolute Lymphocytes 3.2 0.8 - 5.3 10e3/uL    Absolute Monocytes 0.4 0.0 - 1.3 10e3/uL    Absolute Eosinophils 0.2 0.0 - 0.7 10e3/uL    Absolute Basophils 0.1 0.0 - 0.2 10e3/uL    Absolute Immature Granulocytes 0.0 <=0.4 10e3/uL    Absolute NRBCs 0.0 10e3/uL   Drug Abuse Screen Qual Urine     Status: Abnormal   Result Value Ref Range    Amphetamines Urine Screen Negative Screen Negative    Barbituates Urine Screen Negative Screen Negative    Benzodiazepine Urine Screen Positive (A) Screen Negative    Cannabinoids Urine Screen Positive (A) Screen Negative    Cocaine Urine Screen Negative Screen Negative    Fentanyl Qual Urine Screen Negative Screen Negative    Opiates Urine Screen Negative Screen Negative    PCP Urine Screen Negative Screen Negative   Alcohol breath test POCT     Status: Abnormal   Result Value Ref Range    Alcohol Breath Test 0.258 (A) 0.00 - 0.01   Urine Drugs of Abuse Screen     Status: Abnormal    Narrative    The following orders were created for panel order Urine  Drugs of Abuse Screen.  Procedure                               Abnormality         Status                     ---------                               -----------         ------                     Drug Abuse Screen Qual U...[[id number]]  Abnormal            Final result                 Please view results for these tests on the individual orders.   CBC with platelets differential     Status: None    Narrative    The following orders were created for panel order CBC with platelets differential.  Procedure                               Abnormality         Status                     ---------                               -----------         ------                     CBC with platelets and d...[[id number]]                      Final result                 Please view results for these tests on the individual orders.     Medications   diazepam (VALIUM) tablet 5-20 mg (10 mg Oral $Given 9/24/23 1500)   thiamine (B-1) tablet 100 mg (100 mg Oral $Given 9/24/23 1500)   folic acid (FOLVITE) tablet 1 mg (1 mg Oral $Given 9/24/23 1500)   multivitamin w/minerals (THERA-VIT-M) tablet 1 tablet (1 tablet Oral $Given 9/24/23 1500)   nicotine (COMMIT) lozenge 2 mg (2 mg Buccal $Given 9/24/23 1600)     Labs Ordered and Resulted from Time of ED Arrival to Time of ED Departure   COMPREHENSIVE METABOLIC PANEL - Abnormal       Result Value    Sodium 142      Potassium 4.1      Chloride 105      Carbon Dioxide (CO2) 22      Anion Gap 15      Urea Nitrogen 18.6      Creatinine 0.73      Calcium 9.0      Glucose 95      Alkaline Phosphatase 130 (*)      (*)      (*)     Protein Total 7.7      Albumin 4.5      Bilirubin Total <0.2      GFR Estimate >90     DRUG ABUSE SCREEN QUAL URINE - Abnormal    Amphetamines Urine Screen Negative      Barbituates Urine Screen Negative      Benzodiazepine Urine Screen Positive (*)     Cannabinoids Urine Screen Positive (*)     Cocaine Urine Screen Negative      Fentanyl Qual Urine Screen  "Negative      Opiates Urine Screen Negative      PCP Urine Screen Negative     ALCOHOL BREATH TEST POCT - Abnormal    Alcohol Breath Test 0.258 (*)    CBC WITH PLATELETS AND DIFFERENTIAL    WBC Count 6.3      RBC Count 4.89      Hemoglobin 14.9      Hematocrit 43.7      MCV 89      MCH 30.5      MCHC 34.1      RDW 13.6      Platelet Count 345      % Neutrophils 38      % Lymphocytes 50      % Monocytes 6      % Eosinophils 4      % Basophils 1      % Immature Granulocytes 1      NRBCs per 100 WBC 0      Absolute Neutrophils 2.3      Absolute Lymphocytes 3.2      Absolute Monocytes 0.4      Absolute Eosinophils 0.2      Absolute Basophils 0.1      Absolute Immature Granulocytes 0.0      Absolute NRBCs 0.0       No orders to display          Critical care was not performed.     Medical Decision Making  The patient's presentation was of moderate complexity (a chronic illness mild to moderate exacerbation, progression, or side effect of treatment).    The patient's evaluation involved:  ordering and/or review of 3+ test(s) in this encounter (see separate area of note for details)    The patient's management necessitated high risk (a decision regarding hospitalization).    Assessment & Plan    Alcohol abuse and dependence with h/o withdrawal seizure, states that he has been taking mouthwash \"a lot\" at least one bottle a day for almost five years, last seizure 3 days ago just before he came here, left before Detox bed ready, no SI HI Hallucination, no acute medical concerns, wish to be admitted to Detox, D/W MH intake, labs ok, MSSA with valium prn started.    I have reviewed the nursing notes. I have reviewed the findings, diagnosis, plan and need for follow up with the patient.    New Prescriptions    No medications on file       Final diagnoses:   Alcohol abuse with intoxication (H)   Alcohol dependence with uncomplicated intoxication (H)   IPrimo, am serving as a trained medical scribe to document services " personally performed by Sunitha Lieberman Md, MD, based on the provider's statements to me.     I, Sunitha Lieberman Md, MD, was physically present and have reviewed and verified the accuracy of this note documented by Primo Lucero.      Sunitha Lieberman MD  [facility] EMERGENCY DEPARTMENT  9/24/2023     Sunitha Lieberman MD  09/24/23 0192

## 2023-09-24 NOTE — ED NOTES
Pt noted to have strong medicinal odor. I asked  to re-search pt. Pt was found to have a large bottle of mouthwash that was a little less than 1/2 full tucked in the front of his pants. This was removed, placed with pt's other belongings. Pt was advised that he is not allowed to drink etoh while in the ED waiting for detox bed.

## 2023-09-24 NOTE — PHARMACY-ADMISSION MEDICATION HISTORY
Pharmacy Intern Admission Medication History    Admission medication history is complete. The information provided in this note is only as accurate as the sources available at the time of the update.    Medication reconciliation/reorder completed by provider prior to medication history? No    Information Source(s): Patient and CareEverywhere/SureScripts via in-person    Pertinent Information: Patient was unsure of some of the medication names and only familiar with some.   Deleted medications were done based on CareEverywhere search; original written prescriptions dated back to 2017.  Patient reported that buprenorphine 5 mcg/HR patch was applied on 09/15 and feel off in the shower on 09/19. Patient has not been able to go to the clinic to get a new patch since.   Patient reported not taking quetiapine and VRAYLAR anymore based on personal preference.     Changes made to PTA medication list:  Added:   Omeprazole DR 20 mg capsule  Take 1 capsule PO QD  Deleted:   Alum & Mag hydroxide-simethicone (Mylanta/Maalox) 200-200-20 mg/ 5 mL suspension  Take 30 mLs PO Q6H PRN indigestion  Divalproex 500 mg EC tablet (2 prescriptions with 2 different sig)  Take 1 tablet PO AM  Take 3 tablets PO HS  Folic acid 1 mg tablet  Take 1 tablet PO QD  Guaifenesin 20 mg/ mL solution  Take 10 mLs PO Q4H PRN cough  Loratadine 10 mg tablet  Take 10 mg PO PRN allergies  Lurasidone 20 mg tablet   Take 1 tablet PO BID  Mirtazapine 15 mg tablet   Take 1 tablet PO QD  Phenol-menthol 14.5 mg lozenge  Place 1 lozenge inside cheek Q2H PRN pain   Vraylar 6 mg capsule  No sig specified   Changed:   Gabapentin 300 mg capsule  Take 1 capsule PO 4 times a day --> Take 1 capsule PO 3 times a day    Allergies reviewed with patient and updates made in EHR: no    Medication History Completed By: Seymour West 9/24/2023 5:55 PM    Prior to Admission medications    Medication Sig Last Dose Taking? Auth Provider Long Term End Date   buprenorphine (BUTRANS) 10  MCG/HR WK patch Place 1 patch onto the skin once a week 9/15/2023 Yes Reported, Patient     gabapentin (NEURONTIN) 300 MG capsule Take 1 capsule (300 mg) by mouth 4 times daily  Patient taking differently: Take 300 mg by mouth 3 times daily 9/24/2023 Yes Damir Patel MD Yes    IBUPROFEN PO Take 200-400 mg by mouth every 6 hours as needed for moderate pain 9/23/2023 at PM Yes Reported, Patient     multivitamin, therapeutic with minerals (THERA-VIT-M) TABS tablet Take 1 tablet by mouth daily 9/24/2023 at AM Yes Damir Patel MD     nicotine polacrilex 4 MG lozenge Place 1-2 lozenges (4-8 mg) inside cheek every hour as needed for other (nicotine withdrawal symptoms) Unknown Yes Damir Patel MD     omeprazole (PRILOSEC) 20 MG DR capsule Take 20 mg by mouth daily 9/24/2023 at AM Yes Unknown, Entered By History     QUEtiapine (SEROQUEL) 100 MG tablet Take 0.5-1 tablets ( mg) by mouth 4 times daily as needed (Anxiety, Agitation)  Patient not taking: Reported on 9/24/2023 Not Taking No Damir Patel MD Yes    QUEtiapine (SEROQUEL) 200 MG tablet Take 1 tablet (200 mg) by mouth At Bedtime  Patient not taking: Reported on 9/24/2023 Not Taking No Damir Patel MD Yes    senna-docusate (SENOKOT-S;PERICOLACE) 8.6-50 MG per tablet Take 2 tablets by mouth nightly as needed for constipation  Patient not taking: Reported on 9/24/2023 Not Taking No Reported, Patient     thiamine 100 MG tablet Take 1 tablet (100 mg) by mouth daily  Patient not taking: Reported on 9/24/2023 Not Taking No Damir Patel MD

## 2023-09-25 LAB

## 2023-09-25 PROCEDURE — 250N000011 HC RX IP 250 OP 636: Performed by: PSYCHIATRY & NEUROLOGY

## 2023-09-25 PROCEDURE — HZ2ZZZZ DETOXIFICATION SERVICES FOR SUBSTANCE ABUSE TREATMENT: ICD-10-PCS | Performed by: PSYCHIATRY & NEUROLOGY

## 2023-09-25 PROCEDURE — 93005 ELECTROCARDIOGRAM TRACING: CPT

## 2023-09-25 PROCEDURE — 250N000013 HC RX MED GY IP 250 OP 250 PS 637: Performed by: PSYCHIATRY & NEUROLOGY

## 2023-09-25 PROCEDURE — 93010 ELECTROCARDIOGRAM REPORT: CPT | Performed by: INTERNAL MEDICINE

## 2023-09-25 PROCEDURE — 99221 1ST HOSP IP/OBS SF/LOW 40: CPT | Mod: AI | Performed by: PHYSICIAN ASSISTANT

## 2023-09-25 PROCEDURE — 87635 SARS-COV-2 COVID-19 AMP PRB: CPT | Performed by: PHYSICIAN ASSISTANT

## 2023-09-25 PROCEDURE — 87633 RESP VIRUS 12-25 TARGETS: CPT | Performed by: PHYSICIAN ASSISTANT

## 2023-09-25 PROCEDURE — 99223 1ST HOSP IP/OBS HIGH 75: CPT | Mod: AI | Performed by: PSYCHIATRY & NEUROLOGY

## 2023-09-25 PROCEDURE — 128N000004 HC R&B CD ADULT

## 2023-09-25 RX ORDER — GABAPENTIN 400 MG/1
400 CAPSULE ORAL 3 TIMES DAILY
Status: DISCONTINUED | OUTPATIENT
Start: 2023-09-25 | End: 2023-09-26 | Stop reason: HOSPADM

## 2023-09-25 RX ORDER — HYDRALAZINE HYDROCHLORIDE 10 MG/1
10 TABLET, FILM COATED ORAL EVERY 6 HOURS PRN
Status: DISCONTINUED | OUTPATIENT
Start: 2023-09-25 | End: 2023-09-26 | Stop reason: HOSPADM

## 2023-09-25 RX ORDER — OLANZAPINE 5 MG/1
5 TABLET ORAL 4 TIMES DAILY PRN
Status: DISCONTINUED | OUTPATIENT
Start: 2023-09-25 | End: 2023-09-25

## 2023-09-25 RX ORDER — OLANZAPINE 5 MG/1
5-10 TABLET ORAL 3 TIMES DAILY PRN
Status: DISCONTINUED | OUTPATIENT
Start: 2023-09-25 | End: 2023-09-26 | Stop reason: HOSPADM

## 2023-09-25 RX ORDER — OLANZAPINE 10 MG/2ML
10 INJECTION, POWDER, FOR SOLUTION INTRAMUSCULAR 3 TIMES DAILY PRN
Status: DISCONTINUED | OUTPATIENT
Start: 2023-09-25 | End: 2023-09-26 | Stop reason: HOSPADM

## 2023-09-25 RX ADMIN — NICOTINE POLACRILEX 8 MG: 4 LOZENGE ORAL at 16:36

## 2023-09-25 RX ADMIN — GABAPENTIN 400 MG: 400 CAPSULE ORAL at 13:01

## 2023-09-25 RX ADMIN — DIAZEPAM 10 MG: 5 TABLET ORAL at 09:01

## 2023-09-25 RX ADMIN — ATENOLOL 50 MG: 50 TABLET ORAL at 16:18

## 2023-09-25 RX ADMIN — DIAZEPAM 10 MG: 5 TABLET ORAL at 18:43

## 2023-09-25 RX ADMIN — DIAZEPAM 10 MG: 5 TABLET ORAL at 16:17

## 2023-09-25 RX ADMIN — CARIPRAZINE 1.5 MG: 1.5 CAPSULE, GELATIN COATED ORAL at 10:30

## 2023-09-25 RX ADMIN — FOLIC ACID 1 MG: 1 TABLET ORAL at 09:01

## 2023-09-25 RX ADMIN — ONDANSETRON 4 MG: 4 TABLET, ORALLY DISINTEGRATING ORAL at 16:18

## 2023-09-25 RX ADMIN — NICOTINE POLACRILEX 8 MG: 4 LOZENGE ORAL at 20:56

## 2023-09-25 RX ADMIN — MULTIPLE VITAMINS W/ MINERALS TAB 1 TABLET: TAB at 09:01

## 2023-09-25 RX ADMIN — HYDROXYZINE HYDROCHLORIDE 25 MG: 25 TABLET, FILM COATED ORAL at 21:11

## 2023-09-25 RX ADMIN — GABAPENTIN 400 MG: 400 CAPSULE ORAL at 21:11

## 2023-09-25 RX ADMIN — DIAZEPAM 10 MG: 5 TABLET ORAL at 23:50

## 2023-09-25 RX ADMIN — DIAZEPAM 10 MG: 5 TABLET ORAL at 04:56

## 2023-09-25 RX ADMIN — DIAZEPAM 10 MG: 5 TABLET ORAL at 21:10

## 2023-09-25 RX ADMIN — DIAZEPAM 10 MG: 5 TABLET ORAL at 00:42

## 2023-09-25 RX ADMIN — DIAZEPAM 10 MG: 5 TABLET ORAL at 13:01

## 2023-09-25 RX ADMIN — THIAMINE HCL TAB 100 MG 100 MG: 100 TAB at 09:01

## 2023-09-25 RX ADMIN — LURASIDONE HYDROCHLORIDE 20 MG: 20 TABLET, COATED ORAL at 09:01

## 2023-09-25 RX ADMIN — LORATADINE 10 MG: 10 TABLET ORAL at 09:01

## 2023-09-25 RX ADMIN — MIRTAZAPINE 15 MG: 15 TABLET, FILM COATED ORAL at 21:11

## 2023-09-25 RX ADMIN — GABAPENTIN 300 MG: 300 CAPSULE ORAL at 09:01

## 2023-09-25 RX ADMIN — OMEPRAZOLE 20 MG: 20 CAPSULE, DELAYED RELEASE ORAL at 09:01

## 2023-09-25 ASSESSMENT — ACTIVITIES OF DAILY LIVING (ADL)
ADLS_ACUITY_SCORE: 28
DRESS: INDEPENDENT
ADLS_ACUITY_SCORE: 28
ORAL_HYGIENE: INDEPENDENT
HYGIENE/GROOMING: INDEPENDENT
DRESS: INDEPENDENT
HYGIENE/GROOMING: INDEPENDENT
ADLS_ACUITY_SCORE: 28
LAUNDRY: UNABLE TO COMPLETE
ADLS_ACUITY_SCORE: 28
ORAL_HYGIENE: INDEPENDENT
ADLS_ACUITY_SCORE: 28

## 2023-09-25 NOTE — DISCHARGE INSTRUCTIONS
Behavioral Discharge Planning and Instructions  THANK YOU FOR CHOOSING [facility]  3A  [phone]    Summary: You were admitted to Station 3A on *** for detoxification from ***.  A medical exam was performed that included lab work. You have met with a  and opted to ***.  Please take care and make your recovery a daily priority, Kai!  It was a pleasure working with you and the entire treatment team here wishes you the very best in your recovery!     Recommendation:  ***    Main Diagnoses:  Per {3AMD2:220559};  {Substance Related Use Disorders:157356}    Major Treatments, Procedures and Findings:  You were treated for *** detoxification using ***. As an outpatient you will be prescribed ***, please take this medication as prescribed until it is gone. You *** a chemical dependency assessment. You had labs drawn and those results were reviewed with you. Please take a copy of your lab work with you to your next primary care provider appointment.    Symptoms to Report:  If you experience more anxiety, confusion, sleeplessness, deep sadness or thoughts of suicide, notify your treatment team or notify your primary care provider. IF ANY OF THE SYMPTOMS YOU ARE EXPERIENCING ARE A MEDICAL EMERGENCY CALL 911 IMMEDIATELY.     Lifestyle Adjustment: Adjust your lifestyle to get enough sleep, relaxation, exercise and good nutrition. Continue to develop healthy coping skills to decrease stress and promote a sober living environment. Do not use mood altering substances including alcohol, illegal drugs or addictive medications other than what is currently prescribed.     Disposition: ***    Facts about COVID19 at www.cdc.gov/COVID19 and www.[place].gov/covid19    Keeping hands clean is one of the most important steps we can take to avoid getting sick and spreading germs to others.  Please wash your hands frequently and lather with soap for at least 20 seconds!    Follow-up Appointment:   Appointment Date/Time: ***     Psychiatrist/Primary Care Giver: ***    Address: ***    Phone Number: ***      Recovery apps for your phone to locate current in person and zoom recovery meetings  [place] - meeting sayda  AA  - meeting sayda  Meeting guide - meeting sayda  Quick NA meeting - meeting sayda  Sahra- has various apps    Resources:  Some AA/NA meetings are being held online however most have returned to in-person or a hybrid combination please check online to verify*  Need Support Now? If you or someone you know is struggling or in crisis, help is available. Call or text [employer]. or chat [employer]  AA meetings search for them at: https://aa-intergroup.org (worldwide meeting listings)  AA meetings for [place] area can be found online at: https://a[place].org (click local online meetings listings)  NA meetings for [place] area can be found online at: https://www.nam[place].org  (click find a meeting)  AA and NA Sponsors are excellent resources for support and you can find one at any support group meeting.   Alcoholics Anonymous (https://aa.org/): for information 24 hours/day  AA Intergroup service office in [place] (http://www.aastpaul.org/) [phone]  AA Intergroup service office in [facility]: [phone]. (http://www.aaminneaCodeBabyis.org/)  Narcotics Anonymous (www.nam[place].org) (638) 353-2167  https://aafairviewriverside.org/meetings  SMART Recovery - self management for addiction recovery:  www.smartrecovery.org  Pathways ~ A Health Crisis Resource & Support Center:  127.774.8292.  https://prescribetoprevent.org/patient-education/videos/  http://www.harmreduction.org  [facility] [phone]  Support Group:  AA/NA and Sponsor/support.  National [place]oth on Mental Illness (www.[place].brittany.org): 313.180.7939 or 026-136-7369.  Alcoholics Anonymous (www.alcoholics-anonymous.org): Check your phone book for your local chapter.  Suicide Awareness Voices of Education (SAVE) (www.save.org): 149-608-SAVE  (3781)  National Suicide Prevention Line (www.mentalhealthmn.org): 459-832-DZGT (2077)  Mental Health Consumer/Survivor Network of [place] (www.mhcsn.net): 266.544.5529 or 456-482-2929  Mental Health Association of [place] (www.mentalhealth.org): 923.158.6074 or 748-454-2571   Substance Abuse and Mental Health Services (www.samhsa.gov)  [place] Opioid Prevention Coalition: www.opioidcoalition.org    [place] Recovery Connection (MRC)  [facility] connects people seeking recovery to resources that help foster and sustain long-term recovery.  Whether you are seeking resources for treatment, transportation, housing, job training, education, health care or other pathways to recovery, [facility] is a great place to start.  [phone].  www.[place]cHighfivey.[facility] Pod casts for nutrition and wellness  Listen on Apple Podcasts  [employer] Nutrition   [employer]   Nutrition       Understand the connection between what you eat and how you feel. Hosted by licensed nutritionists and dietitians from [employer] we share practical, real-life solutions for healthier living through nutrition.     General Medication Instructions:   See your medication sheet(s) for instructions.   Take all medications as prescribed.  Make no changes unless your primary care provider suggests them.   Go to all your primary care provider visits.  Be sure to have all your required lab tests. This way, your medicines can be refilled on time.  Do not use any forms of alcohol.    Please Note:  If you have any questions at anytime after you are discharged please call [facility] detox unit 3AW at [phone].  [facility], Behavioral Intake [phone]  Medical Records call [phone]  Outpatient Behavioral Intake call [phone]  LP+ Wait List/Bed Availability call [phone]    Please remember to take all of your behavioral discharge planning and lab paperwork to any follow up appointments, it contains  your lab results, diagnosis, medication list and discharge recommendations.      THANK YOU FOR CHOOSING  [facility]

## 2023-09-25 NOTE — PROGRESS NOTES
"Triage & Transition Services, [address]     Kai Sims  September 25, 2023    Insurance: [facility]     Legal Status: Voluntary     SUDs Assessment Status: Detox Only     ROIs on file: None at this time.     Living Situation: Patient reports he lives at a Wet House     Current Providers and Supports:  Patient reports he has no support system.    Encounter: Patient reported he has been living at a Wet House where he plans to return. He shared he has been drinking up to 2 bottles of mouthwash daily and came to detox to get a \"break\" from the Wet House and declined any other services from writer.    Collateral: None     Current Plan: Detox Only     RN updated.    NAYELI Mata  Triage & Transition Services - Mental Health and Addiction Service Line  [address] - Adult Inpatient Addiction Psychiatry Unit           "

## 2023-09-25 NOTE — PROGRESS NOTES
Pt woke soaking wet, requesting medications for withdrawal.  Bedding and scrubs changed.  Assessments completed, pt requesting snack.  Pt awake and alert, medicated with valium, scheduled gabapentin and remeron.

## 2023-09-25 NOTE — H&P
Kai Sims is a 47 year old male    History was provided by CANDIDA who was a limited historian.   CHIEF COMPLAINT:  mouth wash     HISTORY OF PRESENT ILLNESS:      Per ED Provider Note dated :     Alcohol Intoxication       Pt is intoxicated and has history of seizures and would like detox.        HPI  Kai Sims is a 47 year old male with a past medical history of polysubstance abuse, bipolar 1, and Wernicke-Korsakoff syndrome who presents to the Emergency Department seeking detox from alcohol. He reports that he has been drinking multiple bottles of mouthwash on a daily basis for 5+ years now. He also notes a history of seizures, and states that he had one last night.              Per my interview with patient:    Patient is an extremely poor historian and I made 2 attempts to talk with the patient back he refuses to engage in saying that he is tired.  He has chronic mental illness bipolar affective disorder history of methamphetamine abuse and alcohol dependence.  He has hepatitis C subdural hematoma he is not compliant with his medication.  Patient has been using alcohol and methamphetamine he is vague about amounts and frequencies  He reports he is drinking a large amount of mouthwash and is using methamphetamine  He was in the emergency room on 9/21/2023.  He returned again on 9/24/2023          Patient has tolerance, withdrawal, progressive use, loss of control, spending more time and more amount than intended. Patient has made attempts to quit, is experiencing cravings, and reports negative consequences.      He does have history of seizures and DTs               He reports he has been using marijuana and smokes 1 pack of cigarettes a day  His urine drug screen is positive for benzos cannabis  It is negative for amphetamines  He came with a breathalyzer of 0.258    PSYCHIATRIC REVIEW OF SYSTEMS:         Psychiatric Review of Systems:   Depression:   Declined to answer  Jennifer: Declined to  answer  Patient has old records when he was sober that he did have manic episodes he was on Depakote Seroquel Zyprexa most recently on vylar and gabapentin    Psychosis:   .  Denies: visual hallucinations, auditory hallucinations, paranoia  Anxiety: Declined to answer        PSYCHIATRIC HISTORY     Previous diagnoses: Bipolar affective disorder      Patient reports he was in several treatments but again does not provide any details saying that he just wants to sleep    Patient has made suicide attempts in the past with overdose on heroin  He was in several chemical dependency treatments however is not able to tell me more details about it          SOCIAL HISTORY                                                                         As per records he has 1 son he is currently unemployed      Family History:   FAMILY HISTORY:   Family History   Problem Relation Age of Onset    Substance Abuse Father     Substance Abuse Sister      Family Mental Health History-records indicate that patient's father committed suicide    Substance Use Problems - present for substance abuse in father and sister             PTA Medications:     Medications Prior to Admission   Medication Sig Dispense Refill Last Dose    buprenorphine (BUTRANS) 10 MCG/HR WK patch Place 1 patch onto the skin once a week   9/15/2023    gabapentin (NEURONTIN) 300 MG capsule Take 1 capsule (300 mg) by mouth 4 times daily (Patient taking differently: Take 300 mg by mouth 3 times daily) 90 capsule 1 9/24/2023    IBUPROFEN PO Take 200-400 mg by mouth every 6 hours as needed for moderate pain   9/23/2023 at PM    multivitamin, therapeutic with minerals (THERA-VIT-M) TABS tablet Take 1 tablet by mouth daily 30 each 0 9/24/2023 at AM    nicotine polacrilex 4 MG lozenge Place 1-2 lozenges (4-8 mg) inside cheek every hour as needed for other (nicotine withdrawal symptoms) 360 tablet 0 Unknown    omeprazole (PRILOSEC) 20 MG DR capsule Take 20 mg by mouth daily    9/24/2023 at AM    QUEtiapine (SEROQUEL) 100 MG tablet Take 0.5-1 tablets ( mg) by mouth 4 times daily as needed (Anxiety, Agitation) (Patient not taking: Reported on 9/24/2023) 90 tablet 1 Not Taking    QUEtiapine (SEROQUEL) 200 MG tablet Take 1 tablet (200 mg) by mouth At Bedtime (Patient not taking: Reported on 9/24/2023) 60 tablet 0 Not Taking    senna-docusate (SENOKOT-S;PERICOLACE) 8.6-50 MG per tablet Take 2 tablets by mouth nightly as needed for constipation (Patient not taking: Reported on 9/24/2023)   Not Taking    thiamine 100 MG tablet Take 1 tablet (100 mg) by mouth daily (Patient not taking: Reported on 9/24/2023) 30 tablet 0 Not Taking          Allergies:   No Known Allergies       Labs:     Recent Results (from the past 48 hour(s))   Alcohol breath test POCT    Collection Time: 09/24/23  1:52 PM   Result Value Ref Range    Alcohol Breath Test 0.258 (A) 0.00 - 0.01   Drug Abuse Screen Qual Urine    Collection Time: 09/24/23  2:21 PM   Result Value Ref Range    Amphetamines Urine Screen Negative Screen Negative    Barbituates Urine Screen Negative Screen Negative    Benzodiazepine Urine Screen Positive (A) Screen Negative    Cannabinoids Urine Screen Positive (A) Screen Negative    Cocaine Urine Screen Negative Screen Negative    Fentanyl Qual Urine Screen Negative Screen Negative    Opiates Urine Screen Negative Screen Negative    PCP Urine Screen Negative Screen Negative   Comprehensive metabolic panel    Collection Time: 09/24/23  2:22 PM   Result Value Ref Range    Sodium 142 136 - 145 mmol/L    Potassium 4.1 3.4 - 5.3 mmol/L    Chloride 105 98 - 107 mmol/L    Carbon Dioxide (CO2) 22 22 - 29 mmol/L    Anion Gap 15 7 - 15 mmol/L    Urea Nitrogen 18.6 6.0 - 20.0 mg/dL    Creatinine 0.73 0.67 - 1.17 mg/dL    Calcium 9.0 8.6 - 10.0 mg/dL    Glucose 95 70 - 99 mg/dL    Alkaline Phosphatase 130 (H) 40 - 129 U/L     (H) 0 - 45 U/L     (H) 0 - 70 U/L    Protein Total 7.7 6.4 - 8.3 g/dL  "   Albumin 4.5 3.5 - 5.2 g/dL    Bilirubin Total <0.2 <=1.2 mg/dL    GFR Estimate >90 >60 mL/min/1.73m2   CBC with platelets and differential    Collection Time: 09/24/23  2:22 PM   Result Value Ref Range    WBC Count 6.3 4.0 - 11.0 10e3/uL    RBC Count 4.89 4.40 - 5.90 10e6/uL    Hemoglobin 14.9 13.3 - 17.7 g/dL    Hematocrit 43.7 40.0 - 53.0 %    MCV 89 78 - 100 fL    MCH 30.5 26.5 - 33.0 pg    MCHC 34.1 31.5 - 36.5 g/dL    RDW 13.6 10.0 - 15.0 %    Platelet Count 345 150 - 450 10e3/uL    % Neutrophils 38 %    % Lymphocytes 50 %    % Monocytes 6 %    % Eosinophils 4 %    % Basophils 1 %    % Immature Granulocytes 1 %    NRBCs per 100 WBC 0 <1 /100    Absolute Neutrophils 2.3 1.6 - 8.3 10e3/uL    Absolute Lymphocytes 3.2 0.8 - 5.3 10e3/uL    Absolute Monocytes 0.4 0.0 - 1.3 10e3/uL    Absolute Eosinophils 0.2 0.0 - 0.7 10e3/uL    Absolute Basophils 0.1 0.0 - 0.2 10e3/uL    Absolute Immature Granulocytes 0.0 <=0.4 10e3/uL    Absolute NRBCs 0.0 10e3/uL   GGT    Collection Time: 09/24/23  2:22 PM   Result Value Ref Range     (H) 8 - 61 U/L   TSH with free T4 reflex    Collection Time: 09/24/23  2:22 PM   Result Value Ref Range    TSH 0.38 0.30 - 4.20 uIU/mL   Hemoglobin A1c    Collection Time: 09/24/23  2:22 PM   Result Value Ref Range    Hemoglobin A1C 5.4 <5.7 %         /87 (BP Location: Right arm, Patient Position: Supine, Cuff Size: Adult Regular)   Pulse 104   Temp 98  F (36.7  C) (Oral)   Resp 14   Ht 1.702 m (5' 7.01\")   Wt 61.6 kg (135 lb 12.9 oz)   SpO2 98%   BMI 21.26 kg/m    Weight is 135 lbs 12.85 oz  Body mass index is 21.26 kg/m .    Physical Exam:     ROS: 10 point ROS neg other than the symptoms noted above in the HPI.            Past Medical History:   PAST MEDICAL HISTORY:   Past Medical History:   Diagnosis Date    Bipolar affective disorder (H)     Substance abuse (H)        PAST SURGICAL HISTORY:   Past Surgical History:   Procedure Laterality Date    ENT SURGERY      " ORTHOPEDIC SURGERY         -    -           MENTAL STATUS EXAM:      Constitutional: General appearance of patient:  Appearance:  awake, alert, appeared as age stated, poor groomed and slightly unkempt  Attitude: Uncooperative to cooperative  Eye Contact:  good  Mood: Irritable  Affect:  congruent   Speech:  clear, coherent normal rate   Psychomotor Behavior:  no evidence of tardive dyskinesia, dystonia, or tics  Thought Process: Less logical, linear and goal oriented  Associations:  no loose associations  Thought Content:  no evidence of psychotic thought and active suicidal ideation present  Denied any active suicidal /homicidation ideation plan intent   Insight: Limited  Judgment: Limited  Oriented to:  time, person, and place  Attention Span and Concentration:  intact  Recent and Remote Memory:  intact  Language:  english with appropriate syntax and vocabulary  Fund of Knowledge: appropriate  Muscle Strength and Tone: normal  Gait and Station: Normal     There are no abnormal or psychotic thoughts, no preoccupations, no overvalued ideas, no rumination, no obsessions, no compulsions, no somatic concerns, no hypochrondriasis, no ideas of reference, and no delusions.  Patient denies homicidal thoughts.   Patient denies suicidal thoughts.  Patient appears to have good judgment and good insight.     Musculoskeletal: Patient shows no abnormalities of motor activity: there is no tremor, no tic, and no dystonia.  There is no apparent muscle atrophy, strength and tone appear normal, and there are no abnormal movements.  Patient has normal gait and stance.    DISCUSSION:         Assessment:       Patient has a biological predisposition with family history positive for chemical dependency  Psychologically patient is experiencing abusing  Patient has these particular stressors noncompliance with medication relapse prone  Patient has chronic illness exacerbation leading to hospitalization progression as described.      Patient has been unable to stop using drugs in the community due to both physical and psychological symptoms.  Continued use will put the patient at risk for medical and/or psychiatric complications.      Inpatient psychiatric hospitalization is warranted at this time for safety, stabilization, and possible adjustment in medications.       Diagnoses:   Alcohol use disorder severe  Alcohol withdrawal severe  Bipolar affective disorder type I most recent episode depressed  Nicotine abuse  Marijuana abuse  With and follow diet abuse          Plan:   Problem list  1#alcohol use disorder severe alcohol withdrawal severe     - MSSA protocol using Valium for management of alcohol withdrawal    - Continue thiamine, folate, and multivitamin daily    MSSA    Eating Disturbances: ate and enjoyed all of it or not applicable  Tremor: 2  Sleep Disturbance: slept through the night or not applicable  Clouding of Sensorium: no evidence  Hallucinations: 0 - none  Quality of Contact: 0 - awareness of examiner and people around him/her  Agitation: 1 - somewhat more than normal activity  Paroxysmal Sweats: 3  Temperature: 99.5 or below  Pulse: 4 - 100 to 109  Total MSSA Score: 11  Patient received 10 mg of diazepam since morning since admission 60 mg  2#patient is willing to take medications for bipolar affective disorder we will start withvylar and gabapentin 3 mg 3 times a day    3#patient's liver enzymes are elevated   we will put internal medicine consult  4#patient's U-Tox is positive for benzos and cannabis will have symptomatic detox from cannabis patient denies using any benzos  5#patient takes Butrans patch we will restart it we will monitor for sedation      - Consider anti-craving medications prior to discharge. Pt willing to review additional information about both naltrexone and Antabuse.  -Alcohol withdrawal nausea prn Zofran as needed for nausea     hydroxyzine 25 mg q4h prn for acute anxiety  Trazodone 50  mg at bedtime prn for sleep disturbances       Patient has been unable to stop using drugs in the community due to both physical and psychological symptoms.  Continued use will put the patient at risk for medical and/or psychiatric complications.    I HAVE REVIEWED LABS WITH PT AND TALKED ABOUT RESULTS WITH PT  I HAVE REVIEWED AND SUMMARIZED OLD RECORDS including his medication reconcilation of his home medications  and PDMP was reviewed  I HAVE SPOKEN WITH RN ABOUT MEDICATIONS AND DETOX SCORES  I HAVE SPOKEN WITH CM ABOUT PTS TREATMENT OPTIONS     Discussed in detail about patient's smoking patient was advised to quit patient was told about the impact of smoking.  Patient's willingness to quit was assessed.  I provided methods and skills for cessation including medication management nicotine gum patch.  Patient did not set a quit date.  Patient is interested in quitting .we discussed pharmacotherapy options .patient agreed to take nicotine gum patch lozenge.  We discussed behavioral change techniques when craving nicotine including deep breathing drinking glass of water, taking a walk.            Laboratory/Imaging:    Liver Function Studies -   Recent Labs   Lab Test 09/24/23  1422   PROTTOTAL 7.7   ALBUMIN 4.5   BILITOTAL <0.2   ALKPHOS 130*   *   *      Last Comprehensive Metabolic Panel:  Sodium   Date Value Ref Range Status   09/24/2023 142 136 - 145 mmol/L Final   04/08/2017 142 133 - 144 mmol/L Final     Potassium   Date Value Ref Range Status   09/24/2023 4.1 3.4 - 5.3 mmol/L Final   04/08/2017 4.4 3.4 - 5.3 mmol/L Final     Chloride   Date Value Ref Range Status   09/24/2023 105 98 - 107 mmol/L Final   04/08/2017 107 94 - 109 mmol/L Final     Carbon Dioxide   Date Value Ref Range Status   04/08/2017 30 20 - 32 mmol/L Final     Carbon Dioxide (CO2)   Date Value Ref Range Status   09/24/2023 22 22 - 29 mmol/L Final     Anion Gap   Date Value Ref Range Status   09/24/2023 15 7 - 15 mmol/L Final    04/08/2017 5 3 - 14 mmol/L Final     Glucose   Date Value Ref Range Status   09/24/2023 95 70 - 99 mg/dL Final   04/08/2017 86 70 - 99 mg/dL Final     Urea Nitrogen   Date Value Ref Range Status   09/24/2023 18.6 6.0 - 20.0 mg/dL Final   04/08/2017 18 7 - 30 mg/dL Final     Creatinine   Date Value Ref Range Status   09/24/2023 0.73 0.67 - 1.17 mg/dL Final   04/08/2017 0.65 (L) 0.66 - 1.25 mg/dL Final     GFR Estimate   Date Value Ref Range Status   09/24/2023 >90 >60 mL/min/1.73m2 Final   04/08/2017 >90  Non  GFR Calc   >60 mL/min/1.7m2 Final     Calcium   Date Value Ref Range Status   09/24/2023 9.0 8.6 - 10.0 mg/dL Final   04/08/2017 8.6 8.5 - 10.1 mg/dL Final     Bilirubin Total   Date Value Ref Range Status   09/24/2023 <0.2 <=1.2 mg/dL Final   04/08/2017 0.6 0.2 - 1.3 mg/dL Final     Alkaline Phosphatase   Date Value Ref Range Status   09/24/2023 130 (H) 40 - 129 U/L Final   04/08/2017 77 40 - 150 U/L Final     ALT   Date Value Ref Range Status   09/24/2023 184 (H) 0 - 70 U/L Final     Comment:     Reference intervals for this test were updated on 6/12/2023 to more accurately reflect our healthy population. There may be differences in the flagging of prior results with similar values performed with this method. Interpretation of those prior results can be made in the context of the updated reference intervals.     04/08/2017 90 (H) 0 - 70 U/L Final     AST   Date Value Ref Range Status   09/24/2023 236 (H) 0 - 45 U/L Final     Comment:     Reference intervals for this test were updated on 6/12/2023 to more accurately reflect our healthy population. There may be differences in the flagging of prior results with similar values performed with this method. Interpretation of those prior results can be made in the context of the updated reference intervals.   04/08/2017 87 (H) 0 - 45 U/L Final                   Medical treatment/interventions:  Medical concerns: As above    - Consults: IM consult  "placed. Appreciate assistance.     Legal Status: Voluntary     Safety Assessment:   Checks: Status 15  Pt has not required locked seclusion or restraints in the past 24 hours to maintain safety, please refer to RN documentation for further details.    The risks, benefits, alternatives and side effects have been discussed and are understood by the patient.       Patient will be treated in therapeutic milieu with appropriate individual and group therapies as described.      Clinical Global Impressions  First:7     Most recent:7     Disposition: Pending clinical stabilization. Pt does  appear interested in COMPLETE DETOX AND DO TRT  Length of stay 3-5 days    Attestation:  Patient has been seen and evaluated by me, Dr Damir Patel MD  The patient was counseled on nature of illness and treatment plan/options  Care was coordinated with treatment team          \"Much or all of the text in this note was generated through the use of Dragon Dictate voice to text software. Errors in spelling or words which appear to be out of contact are unintentional, may be present due having escaped editing\"     "

## 2023-09-25 NOTE — PLAN OF CARE
"Goal Outcome Evaluation:    Plan of Care Reviewed With: patient      Pt MSSA is 10 and 11. He received 10mg of valium for each score. He spent majority of the day resting in his room. He was polite calm, and cooperative with writer. He appeared restless and reports high anxiety. He requested to be left alone. He reports pain 7/10 and declined medication. He denies SI, SIB, and AVH. He has a nonproductive cough and a negative covid test. He ate 75% of breakfast and lunch.      BP (!) 158/102   Pulse 101   Temp 97.1  F (36.2  C) (Oral)   Resp 16   Ht 1.702 m (5' 7.01\")   Wt 61.6 kg (135 lb 12.9 oz)   SpO2 98%   BMI 21.26 kg/m                 "

## 2023-09-25 NOTE — PLAN OF CARE
"Problem: Substance Withdrawal  Goal: Substance Withdrawal  Description: Signs and symptoms of listed problems will be absent or manageable.  Outcome: Progressing    Problem: Adult Behavioral Health Plan of Care  Goal: Plan of Care Review  Outcome: Progressing  Flowsheets (Taken 9/24/2023 1832)  Patient Agreement with Plan of Care: agrees    SBAR  Kai Sims is a 47 year old year old male with a chief complaint of Alcohol Intoxication (Pt is intoxicated and has history of seizures and would like detox.  )    S = Situation:   Admit  B  = Background:   Pt admitted for alcohol withdrawal.  Pt reports that he drinks about 750 ml of Listerine mouth wash a day that he claims is cheap and 70 proof.  Pt lives at [facility]--a known wet house where he intends to return after discharge.  Pt says that he has been getting drunk every day for 5 years.  Pt  also uses IV methamphetamines--his last use was 2 days ago.  A  =  Assessment:   Vital Signs: /84   Pulse 110   Temp 98.6  F (37  C) (Oral)   Resp 16   Ht 1.702 m (5' 7.01\")   Wt 61.6 kg (135 lb 12.9 oz)   SpO2 98%   BMI 21.26 kg/m    Alert and oriented X 3, no SI, no SIB.  Pt has a history of Bipolar disorder, (other possible diagnosi--psychosis, schizoaffective disorder--medical records are inconsistent), liver disease, chronic alcoholism.  Pt arrives disheveled and smelling of listerine and body odor.  Pt apologizes that he hasn't bathed in days.  Pt complains that the listerine stops the alcohol withdrawal sickness but causes him to loose entire days sleeping off the illness that it causes.  Pt is here to get off the listerine.  Pt is cooperative.  R =   Request or Recommendation:   Alcohol withdrawal monitoring, Dr. Patel to evaluate, case management and medicine to see                       "

## 2023-09-25 NOTE — PROGRESS NOTES
09/25/23 1800   Group Therapy Session   Group Attendance attended group session   Time Session Began 1650   Time Session Ended 1740   Total Time (minutes) 20   Total # Attendees 5   Group Type recreation   Group Topic Covered coping skills/lifestyle management   Group Session Detail healthy coping skills   Patient Participation Detail Pt attended some of the Therapeutic Recreation group with focus on leisure education and acquisition of knowledge and skills. Pt was minimally involved in the group discussion and written exercise. Pt very briefly worked on the written portion for the first part of group, but then left the area during the group discussion following. Pt stated he does not do much besides drinking. Pt stated he enjoys playing the guitar, but drinks while he plays.

## 2023-09-25 NOTE — PROGRESS NOTES
Pt is sleeping so soundly unable to medicate at this time.  Will recommend evaluations if patient wakes.

## 2023-09-25 NOTE — CONSULTS
"[facility]  Consult Note - Hospitalist Service  Date of Admission:  9/24/2023  Consult Requested by: Psychiatry   Reason for Consult: \"Detox\"    Assessment & Plan   Kai Sims is a 47 year old male with PMH significant for alcohol use disorder, methamphetamine use, history of IVDU, bipolar disorder, type 1, hepatitis C who was admitted on 9/24/2023 to inpatient Detox. Psychiatry consulted Medicine to assist with medical co-management of patient medical comorbidities     Acute alcohol withdrawal  Alcohol use disorder  Drinking 750mL of listerine mouth wash daily. Last drink just prior to admission 09/24. Presented to detox with breath alcohol level of 0.258. History of alcohol withdrawal. History of withdrawal seizures, reports last one was ~ a few week ago at residence, did not seek medical attention at that time. Currently denies withdrawal symptoms. Appears tremulous on my exam.   -Management of withdrawal per Psychiatry team   -Seizure precautions      Addendum accelerate blood pressure: possibly in the setting of acute withdrawal. Patient currently denies symptoms. Ordered PRN hydralazine PRN for SBP >180 and DBP >105.     Amphetamine use disorder  IVDU  UDS positive for amphetamine on admission. Last use 09/22, per chart, but denies recent use on my exam. Did test positive on admission UDS.   -Monitor for washout   -Management per Psychiatry team     Tachycardia  Noted on exam. Suspect in the setting of acute withdrawal.   -ECG ordered    Cough  Productive cough for the past several weeks.  Has noted to have sick contacts.  No fever, chills, dyspnea, chest tightness, chest pain.  Also denies hemoptysis.  Reports nasal congestion.  On exam his lungs are clear to auscultation, no lymphadenopathy, oropharynx without signs of erythema or exudates.  Labs without leukocytosis and patient is afebrile and maintaining saturations on RA.  Question possible viral " "infection.  -Viral panel ordered  -COVID swab ordered   -PRN robitussin ordered     Elevated LFTs  In a pattern consistent with alcohol use. , , Alk phos 130. T Bilirubin < 0.2. GGT elevated. No abdominal pain or jaundice appreciated on exam.  Suspect in the setting of acute alcohol use.  - Recommend follow-up with PCP in the outpatient setting for recheck in roughly 1 to 2 weeks from discharge  - If patient were to develop fever, jaundice, abdominal pain, nausea, vomiting, please reach out to medicine team for further evaluation    Bipolar disorder  PTA Seroqeul 50-100mg QID PRN for anxiety and agitation, seroquel 200mg qHS  - Management per psychiatry team    Chronic Hepatitis C: Has not been able to follow-up with GI as outpatient in order to have proper treatment.  Recommend that patient to follow-up with GI in the outpatient setting.    History of TBI  Cannabis use  Tobacco use disorder: NRT ordered     Medicine will continue to follow EKG and viral panel      The patient's care was discussed with the Patient.    Clinically Significant Risk Factors Present on Admission                                  Anya Moreno PA-C  Hospitalist Service  Securely message with [employer] (more info)  Text page via [facility] Paging/Directory   ______________________________________________________________________    Chief Complaint   \"I'm feeling fine, can I stay laying down while we talk?\"    History is obtained from the patient    History of Present Illness   Kai Sims is a 47 year old male with a past medical history significant for alcohol use disorder, methamphetamine use, history of IVDU, bipolar disorder type I, hepatitis C who was admitted on 9- to  inpatient Detox for acute alcohol withdrawal.    Kai reports he is feeling well today and has no concerns.  He denies any withdrawal symptoms this morning.  Denies nausea, vomiting, headache, anxiety, diarrhea, tremors.  Reports his last seizure from " withdrawal was just a few weeks ago outside of the hospital that he managed at his residence.  Although per does appear to be quite tremulous on my exam.  He does note a ongoing cough and congestion for the past couple weeks and has had sick contact exposure.  Denies fevers, chills, dyspnea, hemoptysis or chest pain.  Is open to having testing done to check for viruses.  Reports he was tolerating diet this morning.  Denies recent methamphetamine use.  Injects for methamphetamine use.  Denies any rashes or redness at injection sites.  Acknowledges that he should be on something for hepatitis C treatment, however he has missed follow-up appointments in order to have this treated.       Past Medical History    Past Medical History:   Diagnosis Date    Bipolar affective disorder (H)     Substance abuse (H)        Past Surgical History   Past Surgical History:   Procedure Laterality Date    ENT SURGERY      ORTHOPEDIC SURGERY         Medications   I have reviewed this patient's current medications       Review of Systems    The 10 point Review of Systems is negative other than noted in the HPI or here.     Social History   I have reviewed this patient's social history and updated it with pertinent information if needed.  Social History     Tobacco Use    Smoking status: Every Day     Packs/day: 1.00     Types: Cigarettes   Substance Use Topics    Alcohol use: Yes     Comment: 1L vodka daily    Drug use: Yes     Types: Marijuana, Methamphetamines     Comment: Clean for 15 days         Family History   I have reviewed this patient's family history and updated it with pertinent information if needed.  Family History   Problem Relation Age of Onset    Substance Abuse Father     Substance Abuse Sister          Allergies   No Known Allergies     Physical Exam   Vital Signs: Temp: 98  F (36.7  C) Temp src: Oral BP: 132/87 Pulse: 104   Resp: 14 SpO2: 98 % O2 Device: None (Room air)    Weight: 135 lbs 12.85 oz    General  Appearance: laying in bed with blankets on top of him, alert, awake, cooperative, in no acute distress   Eyes: anicteric sclera, no conjunctivitis appreciated   HEENT: normocephalic, atraumatic, no lymphadenopathy appreciated, MMM, no exudates or erythema appreciated on oropharynx  Respiratory: breathing comfortably on RA, intermittent cough appreciated, CTAB, no crackles, wheezing, rhonchi appreciated   Cardiovascular: slight tachycardia, nl S1/S2, no murmurs, clicks, rubs, no peripheral edema bilaterally, 2+ radial pulse on RUE  GI: soft, non-distended, non-tender, normoactive bowel sounds   Skin: no rashes or lesions on uncovered surfaces, injection sites on LUE without signs of erythema, drainage, warmth, or tenderness per palpation   Musculoskeletal: moving all extremities independently, no bony deformities, did not formally assess ROM  Neurologic: grossly non-focal, tremor in bilateral upper extremities appreciated  Psychiatric: affect appropriate     Medical Decision Making       45 MINUTES SPENT BY ME on the date of service doing chart review, history, exam, documentation & further activities per the note.      Data     I have personally reviewed the following data over the past 24 hrs:    6.3  \   14.9   / 345     142 105 18.6 /  95   4.1 22 0.73 \     ALT: 184 (H) AST: 236 (H) AP: 130 (H) TBILI: <0.2   ALB: 4.5 TOT PROTEIN: 7.7 LIPASE: N/A     TSH: 0.38 T4: N/A A1C: 5.4

## 2023-09-25 NOTE — PLAN OF CARE
Behavioral Team Discussion: (9/25/2023)    Continued Stay Criteria/Rationale: Patient admitted for Chemical Use Issues.  Plan: The following services will be provided to the patient; psychiatric assessment, medication management, therapeutic milieu, individual and group support, and skills groups.   Participants: 3A Provider: Dr. Damir Patel MD; 3A RN: Mell Velazco RN; 3A CM's:  Fabi Carlisle .  Summary/Recommendation: Providers will assess today for treatment recommendations, discharge planning, and aftercare plans. CM will meet with pt for discharge planning.   Medical/Physical: Patient reports a history of seizures.   Precautions:   Behavioral Orders   Procedures    Code 1 - Restrict to Unit    Routine Programming     As clinically indicated    Seizure precautions    Status 15     Every 15 minutes.    Withdrawal precautions     Rationale for change in precautions or plan: N/A  Progress: Initial.    ASAM Dimension Scale Ratings:  Dimension 1: 2 Client has some difficulty tolerating and coping with withdrawal discomfort. Client's intoxication may be severe, but responds to support and treatment such that the client does not immediately endanger self or others. Client displays moderate signs and symptoms with moderate risk of severe withdrawal.  Dimension 2: 1 Client tolerates and koko with physical discomfort and is able to get the services that the client needs.  Dimension 3: 2 Client has difficulty with impulse control and lacks coping skills. Client has thoughts of suicide or harm to others without means; however, the thoughts may interfere with participation in some treatment activities. Client has difficulty functioning in significant life areas. Client has moderate symptoms of emotional, behavioral, or cognitive problems. Client is able to participate in most treatment activities.  Dimension 4: 2 Client displays verbal compliance, but lacks consistent behaviors; has low motivation for change; and is  passively involved in treatment.  Dimension 5: 4 No awareness of the negative impact of mental health problems or substance abuse. No coping skills to arrest mental health or addiction illnesses, or prevent relapse.  Dimension 6: 4 Client has (A) Chronically antagonistic significant other, living environment, family, peer group or long-term criminal justice involvement that is harmful to recovery or treatment progress, or (B) Client has an actively antagonistic significant other, family, work, or living environment with immediate threat to the client's safety and well-being.

## 2023-09-25 NOTE — PLAN OF CARE
Problem: Substance Withdrawal  Goal: Substance Withdrawal  Description: Signs and symptoms of listed problems will be absent or manageable.  Outcome: Not Progressing  Flowsheets (Taken 9/24/2023 1198)  Substance Withdrawal Assessed: all  Substance Withdrawal Present: all     Problem: Behavioral Health Plan of Care  Goal: Optimal Comfort and Wellbeing  Outcome: Ongoing, Progressing    Behavioral  Pt appeared sleeping comfortably overnight; no behavioral concerns noted;     Medical  Pt continues in alcohol withdrawal; MSSA 12 and 11; 10 mg x 2 of valium given this shift; Pt has received a total of 60 mg of valium this hospitalization.     No new medical concerns noted.

## 2023-09-25 NOTE — PLAN OF CARE
"Detox Status: Patient is being monitored closely for alcohol withdrawal.    MSSA: 10, 8 and 9.    Mood and Affect: Describes mood as \"Depressed.\"   Affect is blunted, restricted and flat.     LOC and Orientation: Alert. Oriented to person and place. Disoriented to time and situation.    Behavior and Interaction:Patient is mostly isolative to self and room, occasionally out on the unit.    Cooperative with nursing assessment.    Mental Health Symptoms: Reports severe anxiety and moderate depression. Denies other mental health symptoms.    Medical Concerns:None.    Other Concerns: Patient informed writer and charge nurse about wanting to leave AMA. Patient encouraged to wait till tomorrow morning to talk directly with provider. Patient does not see the need to detox, stating \"I live in a wet house, I feel like a drink.\"    Patient asking for buprenorphine (BUTRANS) patch.    Medication Compliant: Patient is compliant with all scheduled medication.    PRN: Valium. Hydroxyzine. Zofran. Atenolol. Nicotine.    Medication Side Effects: Denies, not observed.    Food and Fluid Intake: Reports nausea. Fair appetite. 75% dinner.    Elimination: Denies problems.     Self Care: Independent. Fairly groomed. Showered.    Vital Signs: Denies pain.  /88   Pulse 82   Temp 97.7  F (36.5  C) (Oral)   Resp 16   Ht 1.702 m (5' 7.01\")   Wt 61.6 kg (135 lb 12.9 oz)   SpO2 99%   BMI 21.26 kg/m        Problem: Substance Withdrawal  Goal: Substance Withdrawal  Description: Signs and symptoms of listed problems will be absent or manageable.  Outcome: Progressing   Goal Outcome Evaluation:    Plan of Care Reviewed With: patient                   "

## 2023-09-26 VITALS
TEMPERATURE: 97.5 F | HEIGHT: 67 IN | WEIGHT: 135.8 LBS | BODY MASS INDEX: 21.31 KG/M2 | SYSTOLIC BLOOD PRESSURE: 131 MMHG | HEART RATE: 94 BPM | RESPIRATION RATE: 16 BRPM | OXYGEN SATURATION: 98 % | DIASTOLIC BLOOD PRESSURE: 89 MMHG

## 2023-09-26 LAB
ATRIAL RATE - MUSE: 94 BPM
DIASTOLIC BLOOD PRESSURE - MUSE: NORMAL MMHG
INTERPRETATION ECG - MUSE: NORMAL
P AXIS - MUSE: 66 DEGREES
PR INTERVAL - MUSE: 120 MS
QRS DURATION - MUSE: 90 MS
QT - MUSE: 368 MS
QTC - MUSE: 460 MS
R AXIS - MUSE: 73 DEGREES
SYSTOLIC BLOOD PRESSURE - MUSE: NORMAL MMHG
T AXIS - MUSE: 61 DEGREES
VENTRICULAR RATE- MUSE: 94 BPM

## 2023-09-26 PROCEDURE — 99207 PR NO CHARGE LOS: CPT | Performed by: PHYSICIAN ASSISTANT

## 2023-09-26 PROCEDURE — 250N000013 HC RX MED GY IP 250 OP 250 PS 637: Performed by: PSYCHIATRY & NEUROLOGY

## 2023-09-26 PROCEDURE — 99239 HOSP IP/OBS DSCHRG MGMT >30: CPT | Performed by: PSYCHIATRY & NEUROLOGY

## 2023-09-26 RX ADMIN — BUPRENORPHINE 1 PATCH: 10 PATCH, EXTENDED RELEASE TRANSDERMAL at 08:25

## 2023-09-26 RX ADMIN — GUAIFENESIN AND DEXTROMETHORPHAN 10 ML: 100; 10 SYRUP ORAL at 04:37

## 2023-09-26 RX ADMIN — LORATADINE 10 MG: 10 TABLET ORAL at 08:25

## 2023-09-26 RX ADMIN — HYDROXYZINE HYDROCHLORIDE 25 MG: 25 TABLET, FILM COATED ORAL at 04:38

## 2023-09-26 RX ADMIN — MULTIPLE VITAMINS W/ MINERALS TAB 1 TABLET: TAB at 08:25

## 2023-09-26 RX ADMIN — FOLIC ACID 1 MG: 1 TABLET ORAL at 08:25

## 2023-09-26 RX ADMIN — LURASIDONE HYDROCHLORIDE 20 MG: 20 TABLET, COATED ORAL at 08:25

## 2023-09-26 RX ADMIN — THIAMINE HCL TAB 100 MG 100 MG: 100 TAB at 08:25

## 2023-09-26 RX ADMIN — NICOTINE POLACRILEX 8 MG: 4 LOZENGE ORAL at 06:45

## 2023-09-26 RX ADMIN — DIAZEPAM 10 MG: 5 TABLET ORAL at 04:38

## 2023-09-26 RX ADMIN — NICOTINE POLACRILEX 8 MG: 4 LOZENGE ORAL at 08:31

## 2023-09-26 RX ADMIN — CARIPRAZINE 1.5 MG: 1.5 CAPSULE, GELATIN COATED ORAL at 09:12

## 2023-09-26 RX ADMIN — OMEPRAZOLE 20 MG: 20 CAPSULE, DELAYED RELEASE ORAL at 08:25

## 2023-09-26 RX ADMIN — Medication 1 LOZENGE: at 06:45

## 2023-09-26 RX ADMIN — GABAPENTIN 400 MG: 400 CAPSULE ORAL at 08:25

## 2023-09-26 ASSESSMENT — ACTIVITIES OF DAILY LIVING (ADL)
ADLS_ACUITY_SCORE: 28

## 2023-09-26 NOTE — PLAN OF CARE
Problem: Behavioral Health Plan of Care  Goal: Optimal Comfort and Wellbeing  Outcome: Ongoing, Progressing    Behavioral  Pt appeared sleeping comfortably overnight; no behavioral concerns noted;     Medical  Pt continues in alcohol withdrawal; MSSA 9 and 9; 10 mg x 2 of valium given this shift; Pt has received a total of 130 mg of valium this hospitalization.      No new medical concerns noted.

## 2023-09-26 NOTE — DISCHARGE SUMMARY
Kai Sims MRN# [id number]   Age: 47 year old YOB: 1975     Discharge Physician:  Damir Patel MD      DISCHARGE  DX  Alcohol use disorder severe  Alcohol withdrawal severe  Bipolar affective disorder type I most recent episode depressed  Nicotine abuse  Marijuana abuse           Event Leading to Hospitalization:     See Admission note by admitting provider for patient encounter. for additional details.          Hospital Course:   PATIENT was admitted to [facility] attending  under DR patel, please review the detailed admit note on 9/25/23   The patient was placed under status 15 (15 minute checks) to ensure patient safety.   MSSA protocol was initiated due to the patient's history of alcohol abuse and concern for withdrawal symptoms.  CBC, BMP and utox obtained.    All outpatient medications were continued    PATIENTdid not participate in groups and was not visible in the milieu.   On 9/26/2023 patient signed a 72-hour intent to leave  Patient would benefit from completing treatment but he lives in a wet house and he would like to be discharged back to it  His prognosis is guarded  He will be discharged AGAINST MEDICAL ADVICE      Discussed with patient medications for craving.  Pt is willing to take  antabuse/naltrexone/campral    Spoke with patient about triggers coping skills relapse prevention.    CONSULTS DONE DURING PATIENTS HOSPITALIZATION.  Patient was seen by medicine on date 9/25/2023    This as per their medical consult    Assessment & Plan  Kai Sims is a 47 year old male with PMH significant for alcohol use disorder, methamphetamine use, history of IVDU, bipolar disorder, type 1, hepatitis C who was admitted on 9/24/2023 to inpatient Detox. Psychiatry consulted Medicine to assist with medical co-management of patient medical comorbidities      Acute alcohol withdrawal  Alcohol use disorder  Drinking 750mL of listerine mouth wash daily. Last drink just prior to  admission 09/24. Presented to detox with breath alcohol level of 0.258. History of alcohol withdrawal. History of withdrawal seizures, reports last one was ~ a few week ago at residence, did not seek medical attention at that time. Currently denies withdrawal symptoms. Appears tremulous on my exam.   -Management of withdrawal per Psychiatry team   -Seizure precautions       Addendum accelerate blood pressure: possibly in the setting of acute withdrawal. Patient currently denies symptoms. Ordered PRN hydralazine PRN for SBP >180 and DBP >105.      Amphetamine use disorder  IVDU  UDS positive for amphetamine on admission. Last use 09/22, per chart, but denies recent use on my exam. Did test positive on admission UDS.   -Monitor for washout   -Management per Psychiatry team      Tachycardia  Noted on exam. Suspect in the setting of acute withdrawal.   -ECG ordered     Cough  Productive cough for the past several weeks.  Has noted to have sick contacts.  No fever, chills, dyspnea, chest tightness, chest pain.  Also denies hemoptysis.  Reports nasal congestion.  On exam his lungs are clear to auscultation, no lymphadenopathy, oropharynx without signs of erythema or exudates.  Labs without leukocytosis and patient is afebrile and maintaining saturations on RA.  Question possible viral infection.  -Viral panel ordered  -COVID swab ordered   -PRN robitussin ordered      Elevated LFTs  In a pattern consistent with alcohol use. , , Alk phos 130. T Bilirubin < 0.2. GGT elevated. No abdominal pain or jaundice appreciated on exam.  Suspect in the setting of acute alcohol use.  - Recommend follow-up with PCP in the outpatient setting for recheck in roughly 1 to 2 weeks from discharge  - If patient were to develop fever, jaundice, abdominal pain, nausea, vomiting, please reach out to medicine team for further evaluation     Bipolar disorder  PTA Seroqeul 50-100mg QID PRN for anxiety and agitation, seroquel 200mg  qHS  - Management per psychiatry team     Chronic Hepatitis C: Has not been able to follow-up with GI as outpatient in order to have proper treatment.  Recommend that patient to follow-up with GI in the outpatient setting.     History of TBI  Cannabis use  Tobacco use disorder: NRT ordered      Medicine will continue to follow EKG and viral panel         The patient's care was discussed with the Patient.                     Labs:reviewed with patient       Recent Results (from the past 48 hour(s))   Alcohol breath test POCT    Collection Time: 09/24/23  1:52 PM   Result Value Ref Range    Alcohol Breath Test 0.258 (A) 0.00 - 0.01   Drug Abuse Screen Qual Urine    Collection Time: 09/24/23  2:21 PM   Result Value Ref Range    Amphetamines Urine Screen Negative Screen Negative    Barbituates Urine Screen Negative Screen Negative    Benzodiazepine Urine Screen Positive (A) Screen Negative    Cannabinoids Urine Screen Positive (A) Screen Negative    Cocaine Urine Screen Negative Screen Negative    Fentanyl Qual Urine Screen Negative Screen Negative    Opiates Urine Screen Negative Screen Negative    PCP Urine Screen Negative Screen Negative   Comprehensive metabolic panel    Collection Time: 09/24/23  2:22 PM   Result Value Ref Range    Sodium 142 136 - 145 mmol/L    Potassium 4.1 3.4 - 5.3 mmol/L    Chloride 105 98 - 107 mmol/L    Carbon Dioxide (CO2) 22 22 - 29 mmol/L    Anion Gap 15 7 - 15 mmol/L    Urea Nitrogen 18.6 6.0 - 20.0 mg/dL    Creatinine 0.73 0.67 - 1.17 mg/dL    Calcium 9.0 8.6 - 10.0 mg/dL    Glucose 95 70 - 99 mg/dL    Alkaline Phosphatase 130 (H) 40 - 129 U/L     (H) 0 - 45 U/L     (H) 0 - 70 U/L    Protein Total 7.7 6.4 - 8.3 g/dL    Albumin 4.5 3.5 - 5.2 g/dL    Bilirubin Total <0.2 <=1.2 mg/dL    GFR Estimate >90 >60 mL/min/1.73m2   CBC with platelets and differential    Collection Time: 09/24/23  2:22 PM   Result Value Ref Range    WBC Count 6.3 4.0 - 11.0 10e3/uL    RBC Count 4.89  4.40 - 5.90 10e6/uL    Hemoglobin 14.9 13.3 - 17.7 g/dL    Hematocrit 43.7 40.0 - 53.0 %    MCV 89 78 - 100 fL    MCH 30.5 26.5 - 33.0 pg    MCHC 34.1 31.5 - 36.5 g/dL    RDW 13.6 10.0 - 15.0 %    Platelet Count 345 150 - 450 10e3/uL    % Neutrophils 38 %    % Lymphocytes 50 %    % Monocytes 6 %    % Eosinophils 4 %    % Basophils 1 %    % Immature Granulocytes 1 %    NRBCs per 100 WBC 0 <1 /100    Absolute Neutrophils 2.3 1.6 - 8.3 10e3/uL    Absolute Lymphocytes 3.2 0.8 - 5.3 10e3/uL    Absolute Monocytes 0.4 0.0 - 1.3 10e3/uL    Absolute Eosinophils 0.2 0.0 - 0.7 10e3/uL    Absolute Basophils 0.1 0.0 - 0.2 10e3/uL    Absolute Immature Granulocytes 0.0 <=0.4 10e3/uL    Absolute NRBCs 0.0 10e3/uL   GGT    Collection Time: 09/24/23  2:22 PM   Result Value Ref Range     (H) 8 - 61 U/L   TSH with free T4 reflex    Collection Time: 09/24/23  2:22 PM   Result Value Ref Range    TSH 0.38 0.30 - 4.20 uIU/mL   Hemoglobin A1c    Collection Time: 09/24/23  2:22 PM   Result Value Ref Range    Hemoglobin A1C 5.4 <5.7 %   EKG 12-lead, complete    Collection Time: 09/25/23 12:37 PM   Result Value Ref Range    Systolic Blood Pressure  mmHg    Diastolic Blood Pressure  mmHg    Ventricular Rate 94 BPM    Atrial Rate 94 BPM    IL Interval 120 ms    QRS Duration 90 ms     ms    QTc 460 ms    P Axis 66 degrees    R AXIS 73 degrees    T Axis 61 degrees    Interpretation ECG       Sinus rhythm  Minimal voltage criteria for LVH, may be normal variant  Borderline ECG  When compared with ECG of 29-AUG-2004 14:08,  ST no longer depressed in Inferior leads     Symptomatic COVID-19 Virus (Coronavirus) by PCR Nasopharyngeal    Collection Time: 09/25/23 12:50 PM    Specimen: Nasopharyngeal; Swab   Result Value Ref Range    SARS CoV2 PCR Negative Negative   Respiratory Panel PCR    Collection Time: 09/25/23  2:34 PM    Specimen: Nasopharyngeal; Swab   Result Value Ref Range    Adenovirus Not Detected Not Detected    Coronavirus Not  Detected Not Detected    Human Metapneumovirus Not Detected Not Detected    Human Rhin/Enterovirus Not Detected Not Detected    Influenza A Not Detected Not Detected    Influenza A, H1 Not Detected Not Detected    Influenza A 2009 H1N1 Not Detected Not Detected    Influenza A, H3 Not Detected Not Detected    Influenza B Not Detected Not Detected    Parainfluenza Virus 1 Not Detected Not Detected    Parainfluenza Virus 2 Not Detected Not Detected    Parainfluenza Virus 3 Not Detected Not Detected    Parainfluenza Virus 4 Not Detected Not Detected    Respiratory Syncytial Virus A Not Detected Not Detected    Respiratory Syncytial Virus B Not Detected Not Detected    Chlamydia Pneumoniae Not Detected Not Detected    Mycoplasma Pneumoniae Not Detected Not Detected         Recent Results (from the past 240 hour(s))   Comprehensive metabolic panel    Collection Time: 09/21/23  7:53 PM   Result Value Ref Range    Sodium 141 136 - 145 mmol/L    Potassium 4.2 3.4 - 5.3 mmol/L    Chloride 102 98 - 107 mmol/L    Carbon Dioxide (CO2) 26 22 - 29 mmol/L    Anion Gap 13 7 - 15 mmol/L    Urea Nitrogen 19.8 6.0 - 20.0 mg/dL    Creatinine 0.64 (L) 0.67 - 1.17 mg/dL    Calcium 9.1 8.6 - 10.0 mg/dL    Glucose 91 70 - 99 mg/dL    Alkaline Phosphatase 107 40 - 129 U/L     (H) 0 - 45 U/L     (H) 0 - 70 U/L    Protein Total 8.3 6.4 - 8.3 g/dL    Albumin 4.8 3.5 - 5.2 g/dL    Bilirubin Total 0.2 <=1.2 mg/dL    GFR Estimate >90 >60 mL/min/1.73m2   CBC with platelets and differential    Collection Time: 09/21/23  7:53 PM   Result Value Ref Range    WBC Count 7.5 4.0 - 11.0 10e3/uL    RBC Count 5.03 4.40 - 5.90 10e6/uL    Hemoglobin 15.3 13.3 - 17.7 g/dL    Hematocrit 44.4 40.0 - 53.0 %    MCV 88 78 - 100 fL    MCH 30.4 26.5 - 33.0 pg    MCHC 34.5 31.5 - 36.5 g/dL    RDW 14.1 10.0 - 15.0 %    Platelet Count 336 150 - 450 10e3/uL    % Neutrophils 38 %    % Lymphocytes 51 %    % Monocytes 6 %    % Eosinophils 2 %    % Basophils 2 %     % Immature Granulocytes 1 %    NRBCs per 100 WBC 0 <1 /100    Absolute Neutrophils 2.8 1.6 - 8.3 10e3/uL    Absolute Lymphocytes 3.8 0.8 - 5.3 10e3/uL    Absolute Monocytes 0.4 0.0 - 1.3 10e3/uL    Absolute Eosinophils 0.2 0.0 - 0.7 10e3/uL    Absolute Basophils 0.2 0.0 - 0.2 10e3/uL    Absolute Immature Granulocytes 0.1 <=0.4 10e3/uL    Absolute NRBCs 0.0 10e3/uL   Alcohol breath test POCT    Collection Time: 09/21/23  7:55 PM   Result Value Ref Range    Alcohol Breath Test 0.285 (A) 0.00 - 0.01   Drug Abuse Screen Qual Urine    Collection Time: 09/21/23  8:24 PM   Result Value Ref Range    Amphetamines Urine Screen Positive (A) Screen Negative    Barbituates Urine Screen Negative Screen Negative    Benzodiazepine Urine Screen Negative Screen Negative    Cannabinoids Urine Screen Positive (A) Screen Negative    Cocaine Urine Screen Negative Screen Negative    Fentanyl Qual Urine Screen Negative Screen Negative    Opiates Urine Screen Negative Screen Negative    PCP Urine Screen Negative Screen Negative   Alcohol breath test POCT    Collection Time: 09/24/23  1:52 PM   Result Value Ref Range    Alcohol Breath Test 0.258 (A) 0.00 - 0.01   Drug Abuse Screen Qual Urine    Collection Time: 09/24/23  2:21 PM   Result Value Ref Range    Amphetamines Urine Screen Negative Screen Negative    Barbituates Urine Screen Negative Screen Negative    Benzodiazepine Urine Screen Positive (A) Screen Negative    Cannabinoids Urine Screen Positive (A) Screen Negative    Cocaine Urine Screen Negative Screen Negative    Fentanyl Qual Urine Screen Negative Screen Negative    Opiates Urine Screen Negative Screen Negative    PCP Urine Screen Negative Screen Negative   Comprehensive metabolic panel    Collection Time: 09/24/23  2:22 PM   Result Value Ref Range    Sodium 142 136 - 145 mmol/L    Potassium 4.1 3.4 - 5.3 mmol/L    Chloride 105 98 - 107 mmol/L    Carbon Dioxide (CO2) 22 22 - 29 mmol/L    Anion Gap 15 7 - 15 mmol/L    Urea  Nitrogen 18.6 6.0 - 20.0 mg/dL    Creatinine 0.73 0.67 - 1.17 mg/dL    Calcium 9.0 8.6 - 10.0 mg/dL    Glucose 95 70 - 99 mg/dL    Alkaline Phosphatase 130 (H) 40 - 129 U/L     (H) 0 - 45 U/L     (H) 0 - 70 U/L    Protein Total 7.7 6.4 - 8.3 g/dL    Albumin 4.5 3.5 - 5.2 g/dL    Bilirubin Total <0.2 <=1.2 mg/dL    GFR Estimate >90 >60 mL/min/1.73m2   CBC with platelets and differential    Collection Time: 09/24/23  2:22 PM   Result Value Ref Range    WBC Count 6.3 4.0 - 11.0 10e3/uL    RBC Count 4.89 4.40 - 5.90 10e6/uL    Hemoglobin 14.9 13.3 - 17.7 g/dL    Hematocrit 43.7 40.0 - 53.0 %    MCV 89 78 - 100 fL    MCH 30.5 26.5 - 33.0 pg    MCHC 34.1 31.5 - 36.5 g/dL    RDW 13.6 10.0 - 15.0 %    Platelet Count 345 150 - 450 10e3/uL    % Neutrophils 38 %    % Lymphocytes 50 %    % Monocytes 6 %    % Eosinophils 4 %    % Basophils 1 %    % Immature Granulocytes 1 %    NRBCs per 100 WBC 0 <1 /100    Absolute Neutrophils 2.3 1.6 - 8.3 10e3/uL    Absolute Lymphocytes 3.2 0.8 - 5.3 10e3/uL    Absolute Monocytes 0.4 0.0 - 1.3 10e3/uL    Absolute Eosinophils 0.2 0.0 - 0.7 10e3/uL    Absolute Basophils 0.1 0.0 - 0.2 10e3/uL    Absolute Immature Granulocytes 0.0 <=0.4 10e3/uL    Absolute NRBCs 0.0 10e3/uL   GGT    Collection Time: 09/24/23  2:22 PM   Result Value Ref Range     (H) 8 - 61 U/L   TSH with free T4 reflex    Collection Time: 09/24/23  2:22 PM   Result Value Ref Range    TSH 0.38 0.30 - 4.20 uIU/mL   Hemoglobin A1c    Collection Time: 09/24/23  2:22 PM   Result Value Ref Range    Hemoglobin A1C 5.4 <5.7 %   EKG 12-lead, complete    Collection Time: 09/25/23 12:37 PM   Result Value Ref Range    Systolic Blood Pressure  mmHg    Diastolic Blood Pressure  mmHg    Ventricular Rate 94 BPM    Atrial Rate 94 BPM    AR Interval 120 ms    QRS Duration 90 ms     ms    QTc 460 ms    P Axis 66 degrees    R AXIS 73 degrees    T Axis 61 degrees    Interpretation ECG       Sinus rhythm  Minimal voltage  criteria for LVH, may be normal variant  Borderline ECG  When compared with ECG of 29-AUG-2004 14:08,  ST no longer depressed in Inferior leads     Symptomatic COVID-19 Virus (Coronavirus) by PCR Nasopharyngeal    Collection Time: 09/25/23 12:50 PM    Specimen: Nasopharyngeal; Swab   Result Value Ref Range    SARS CoV2 PCR Negative Negative   Respiratory Panel PCR    Collection Time: 09/25/23  2:34 PM    Specimen: Nasopharyngeal; Swab   Result Value Ref Range    Adenovirus Not Detected Not Detected    Coronavirus Not Detected Not Detected    Human Metapneumovirus Not Detected Not Detected    Human Rhin/Enterovirus Not Detected Not Detected    Influenza A Not Detected Not Detected    Influenza A, H1 Not Detected Not Detected    Influenza A 2009 H1N1 Not Detected Not Detected    Influenza A, H3 Not Detected Not Detected    Influenza B Not Detected Not Detected    Parainfluenza Virus 1 Not Detected Not Detected    Parainfluenza Virus 2 Not Detected Not Detected    Parainfluenza Virus 3 Not Detected Not Detected    Parainfluenza Virus 4 Not Detected Not Detected    Respiratory Syncytial Virus A Not Detected Not Detected    Respiratory Syncytial Virus B Not Detected Not Detected    Chlamydia Pneumoniae Not Detected Not Detected    Mycoplasma Pneumoniae Not Detected Not Detected            Because this patient meets criteria for an Alcohol Use Disorder, I performed the following brief intervention on the date of this note:              1) Expressed concern that the patient is drinking at unhealthy levels known to increase their risk of alcohol related problems              2) Gave feedback linking alcohol use and health, including personalized feedback explaining how alcohol use can interact with their medical and/or psychiatric problems, and with prescribed medications.              3) Advised patient to abstain.    PT counseled on nicotine cessation and nicotine replacement provided    Counseled the patient on the  importance of having a recovery program in addition to medication to manage recovery.  Components include avoiding isolating, having willingness to change, avoiding triggers and managing cravings. Encouraged having some type of sober network and practicing honesty with trusted support person(s).     Discussed with patient many issues of addiction,triggers, relapse, and establishing a solid recovery program.    DISCHARGE MENTAL STATUS EXAMINATION:  The patient is alert, oriented x3.  Good fund of knowledge.  Good use of language.  Recent and remote memory, language, fund of knowledge are all adequate.  Euthymic mood congruent affect  Speech normal rate/rhythm linear tp no loose asso,The patient does not have any active suicidal or homicidal ideation.  Does not have any auditory or visual hallucination.  Poor insight/judgment      Pt was not determined to not be a danger to himself or others. At the current time of discharge, the patient does not meet criteria for involuntary hospitalization. On the day of discharge, the patient reports that they do not have suicidal or homicidal ideation and would never hurt themselves or others. Steps taken to minimize risk include: assessing patient s behavior and thought process daily during hospital stay, discharging patient with adequate plan for follow up for mental and physical health and discussing safety plan of returning to the hospital should the patient ever have thoughts of harming themselves or others. Therefore, based on all available evidence including the factors cited above, the patient does not appear to be at imminent risk for self-harm, and is appropriate for outpatient level of care.     Educated about side effects/risk vs benefits /alternative including non treatment.Pt consented to be on medication.     .Total time spent on discharge summary more than 35 min  More than  20 min  planning, coordination of care, medication reconciliation and performance of  physical exam on day of discharge.Care was coordinated with unit RN and unit therapist         Review of your medicines        UNREVIEWED medicines. Ask your doctor about these medicines        Dose / Directions   buprenorphine 10 MCG/HR WK patch  Commonly known as: BUTRANS      Dose: 1 patch  Place 1 patch onto the skin once a week  Refills: 0     gabapentin 300 MG capsule  Commonly known as: NEURONTIN  Used for: Uncomplicated alcohol dependence (H)      Dose: 300 mg  Take 1 capsule (300 mg) by mouth 4 times daily  Quantity: 90 capsule  Refills: 1     IBUPROFEN PO      Dose: 200-400 mg  Take 200-400 mg by mouth every 6 hours as needed for moderate pain  Refills: 0     multivitamin w/minerals tablet  Used for: Uncomplicated alcohol dependence (H)      Dose: 1 tablet  Take 1 tablet by mouth daily  Quantity: 30 each  Refills: 0     nicotine 4 MG lozenge  Commonly known as: NICORETTE  Used for: Uncomplicated alcohol dependence (H)      Dose: 4-8 mg  Place 1-2 lozenges (4-8 mg) inside cheek every hour as needed for other (nicotine withdrawal symptoms)  Quantity: 360 tablet  Refills: 0     omeprazole 20 MG DR capsule  Commonly known as: PriLOSEC      Dose: 20 mg  Take 20 mg by mouth daily  Refills: 0     * QUEtiapine 100 MG tablet  Commonly known as: SEROquel  Used for: Uncomplicated alcohol dependence (H)      Dose:  mg  Take 0.5-1 tablets ( mg) by mouth 4 times daily as needed (Anxiety, Agitation)  Quantity: 90 tablet  Refills: 1     * QUEtiapine 200 MG tablet  Commonly known as: SEROquel  Used for: Bipolar affective disorder, currently manic, moderate (H)      Dose: 200 mg  Take 1 tablet (200 mg) by mouth At Bedtime  Quantity: 60 tablet  Refills: 0     senna-docusate 8.6-50 MG tablet  Commonly known as: SENOKOT-S/PERICOLACE      Dose: 2 tablet  Take 2 tablets by mouth nightly as needed for constipation  Refills: 0     thiamine 100 MG tablet  Commonly known as: B-1  Used for: Uncomplicated alcohol  "dependence (H)      Dose: 100 mg  Take 1 tablet (100 mg) by mouth daily  Quantity: 30 tablet  Refills: 0           * This list has 2 medication(s) that are the same as other medications prescribed for you. Read the directions carefully, and ask your doctor or other care provider to review them with you.                   Disposition: Patient did not complete detox he will be discharged AGAINST MEDICAL ADVICE     Facts about COVID19 at www.cdc.gov/COVID19 and www.[place].gov/covid19     Keeping hands clean is one of the most important steps we can take to avoid getting sick and spreading germs to others.  Please wash your hands frequently and lather with soap for at least 20 seconds!             \"Much or all of the text in this note was generated through the use of Dragon Dictate voice to text software. Errors in spelling or words which appear to be out of contact are unintentional, may be present due having escaped editing\"     "

## 2023-09-26 NOTE — PROGRESS NOTES
Brief Medicine Note:     Kai Sims is a 47 year old male with PMH significant for alcohol use disorder, methamphetamine use, history of IVDU, bipolar disorder, type 1, hepatitis C who was admitted on 9/24/2023 to inpatient Detox. Psychiatry consulted Medicine to assist with medical co-management of patient medical comorbidities     Follow up viral panel and COVID exam which were both negative.     Medicine will sign off at this time.     JERRY MajorC

## 2023-09-26 NOTE — PROGRESS NOTES
"Pt requested to discharge. He signed 72 hr intent to leave and ama form. He was cooperative with medications and assessments. He became frustrated at 9:30 when he wanted to discharge but was redirectable. He denies SI, HI, SIB, and AVH    /89   Pulse 94   Temp 97.5  F (36.4  C) (Temporal)   Resp 16   Ht 1.702 m (5' 7.01\")   Wt 61.6 kg (135 lb 12.9 oz)   SpO2 98%   BMI 21.26 kg/m      "

## 2023-09-26 NOTE — PLAN OF CARE
Goal Outcome Evaluation:    Plan of Care Reviewed With: patient          Pt was read the leaving against or without medical advice form which states as follows;     I have decided to leave against the advice of my provider (doctor, nurse practitioner, physician's assistant). My provider has not finished my exam and treatment. My provider has told me the risks of leaving now and the benefits of staying for further care.    I know that:  My doctor recommends further care.   Without such care, I may suffer more injury, illness or even death.  I am welcome to return to the Emergency Department at any time for care.     I agree the hospital or clinic and my providers are not responsible for any bad results.    Pt reports no questions and has signed the form. Pt denies suicidal ideation plans or intent. Pt discharge assisted via staff member Jenna directly to the lobby. Pt is now discharged.

## 2024-10-28 ENCOUNTER — TELEPHONE (OUTPATIENT)
Dept: BEHAVIORAL HEALTH | Facility: CLINIC | Age: 49
End: 2024-10-28

## 2024-10-28 ENCOUNTER — HOSPITAL ENCOUNTER (EMERGENCY)
Facility: CLINIC | Age: 49
Discharge: HOME | End: 2024-10-28
Attending: EMERGENCY MEDICINE | Admitting: EMERGENCY MEDICINE
Payer: COMMERCIAL

## 2024-10-28 VITALS
HEART RATE: 105 BPM | RESPIRATION RATE: 16 BRPM | OXYGEN SATURATION: 96 % | SYSTOLIC BLOOD PRESSURE: 149 MMHG | TEMPERATURE: 97.4 F | DIASTOLIC BLOOD PRESSURE: 96 MMHG

## 2024-10-28 DIAGNOSIS — R56.9 ALCOHOL WITHDRAWAL SEIZURE WITHOUT COMPLICATION (H): ICD-10-CM

## 2024-10-28 DIAGNOSIS — F10.930 ALCOHOL WITHDRAWAL SEIZURE WITHOUT COMPLICATION (H): ICD-10-CM

## 2024-10-28 DIAGNOSIS — Z78.9 ADMITTED TO SUBSTANCE MISUSE DETOXIFICATION CENTER: ICD-10-CM

## 2024-10-28 DIAGNOSIS — F10.920 ALCOHOLIC INTOXICATION WITHOUT COMPLICATION (H): ICD-10-CM

## 2024-10-28 LAB
ALBUMIN SERPL BCG-MCNC: 4.7 G/DL (ref 3.5–5.2)
ALP SERPL-CCNC: 103 U/L (ref 40–150)
ALT SERPL W P-5'-P-CCNC: 32 U/L (ref 0–70)
AMPHETAMINES UR QL SCN: ABNORMAL
ANION GAP SERPL CALCULATED.3IONS-SCNC: 17 MMOL/L (ref 7–15)
AST SERPL W P-5'-P-CCNC: 42 U/L (ref 0–45)
BARBITURATES UR QL SCN: ABNORMAL
BASOPHILS # BLD AUTO: 0.1 10E3/UL (ref 0–0.2)
BASOPHILS NFR BLD AUTO: 2 %
BENZODIAZ UR QL SCN: ABNORMAL
BILIRUB SERPL-MCNC: <0.2 MG/DL
BUN SERPL-MCNC: 15.3 MG/DL (ref 6–20)
BZE UR QL SCN: ABNORMAL
CALCIUM SERPL-MCNC: 9.3 MG/DL (ref 8.8–10.4)
CANNABINOIDS UR QL SCN: ABNORMAL
CHLORIDE SERPL-SCNC: 103 MMOL/L (ref 98–107)
CREAT SERPL-MCNC: 0.64 MG/DL (ref 0.67–1.17)
EGFRCR SERPLBLD CKD-EPI 2021: >90 ML/MIN/1.73M2
EOSINOPHIL # BLD AUTO: 0.1 10E3/UL (ref 0–0.7)
EOSINOPHIL NFR BLD AUTO: 1 %
ERYTHROCYTE [DISTWIDTH] IN BLOOD BY AUTOMATED COUNT: 15.5 % (ref 10–15)
ETHANOL SERPL-MCNC: 0.41 G/DL
FENTANYL UR QL: ABNORMAL
GLUCOSE SERPL-MCNC: 101 MG/DL (ref 70–99)
HCO3 SERPL-SCNC: 25 MMOL/L (ref 22–29)
HCT VFR BLD AUTO: 43.8 % (ref 40–53)
HGB BLD-MCNC: 15.1 G/DL (ref 13.3–17.7)
IMM GRANULOCYTES # BLD: 0.1 10E3/UL
IMM GRANULOCYTES NFR BLD: 1 %
LYMPHOCYTES # BLD AUTO: 3.3 10E3/UL (ref 0.8–5.3)
LYMPHOCYTES NFR BLD AUTO: 53 %
MAGNESIUM SERPL-MCNC: 1.9 MG/DL (ref 1.7–2.3)
MCH RBC QN AUTO: 30 PG (ref 26.5–33)
MCHC RBC AUTO-ENTMCNC: 34.5 G/DL (ref 31.5–36.5)
MCV RBC AUTO: 87 FL (ref 78–100)
MONOCYTES # BLD AUTO: 0.2 10E3/UL (ref 0–1.3)
MONOCYTES NFR BLD AUTO: 3 %
NEUTROPHILS # BLD AUTO: 2.5 10E3/UL (ref 1.6–8.3)
NEUTROPHILS NFR BLD AUTO: 40 %
NRBC # BLD AUTO: 0 10E3/UL
NRBC BLD AUTO-RTO: 0 /100
OPIATES UR QL SCN: ABNORMAL
PCP QUAL URINE (ROCHE): ABNORMAL
PLATELET # BLD AUTO: 325 10E3/UL (ref 150–450)
POTASSIUM SERPL-SCNC: 4.2 MMOL/L (ref 3.4–5.3)
PROT SERPL-MCNC: 8.9 G/DL (ref 6.4–8.3)
RBC # BLD AUTO: 5.03 10E6/UL (ref 4.4–5.9)
SODIUM SERPL-SCNC: 145 MMOL/L (ref 135–145)
WBC # BLD AUTO: 6.2 10E3/UL (ref 4–11)

## 2024-10-28 PROCEDURE — 36415 COLL VENOUS BLD VENIPUNCTURE: CPT | Performed by: EMERGENCY MEDICINE

## 2024-10-28 PROCEDURE — 80053 COMPREHEN METABOLIC PANEL: CPT | Performed by: EMERGENCY MEDICINE

## 2024-10-28 PROCEDURE — 85004 AUTOMATED DIFF WBC COUNT: CPT | Performed by: EMERGENCY MEDICINE

## 2024-10-28 PROCEDURE — 82077 ASSAY SPEC XCP UR&BREATH IA: CPT | Performed by: EMERGENCY MEDICINE

## 2024-10-28 PROCEDURE — 99285 EMERGENCY DEPT VISIT HI MDM: CPT | Performed by: EMERGENCY MEDICINE

## 2024-10-28 PROCEDURE — 80307 DRUG TEST PRSMV CHEM ANLYZR: CPT | Performed by: EMERGENCY MEDICINE

## 2024-10-28 PROCEDURE — 250N000013 HC RX MED GY IP 250 OP 250 PS 637: Performed by: EMERGENCY MEDICINE

## 2024-10-28 PROCEDURE — 99207 PR NO CHARGE LOS: CPT | Performed by: NURSE PRACTITIONER

## 2024-10-28 PROCEDURE — 83735 ASSAY OF MAGNESIUM: CPT | Performed by: EMERGENCY MEDICINE

## 2024-10-28 RX ORDER — DIAZEPAM 5 MG/1
5-20 TABLET ORAL EVERY 30 MIN PRN
Status: DISCONTINUED | OUTPATIENT
Start: 2024-10-28 | End: 2024-10-28 | Stop reason: HOSPADM

## 2024-10-28 RX ADMIN — NICOTINE POLACRILEX 4 MG: 4 GUM, CHEWING BUCCAL at 17:38

## 2024-10-28 RX ADMIN — NICOTINE POLACRILEX 4 MG: 4 GUM, CHEWING BUCCAL at 19:39

## 2024-10-28 RX ADMIN — DIAZEPAM 5 MG: 5 TABLET ORAL at 18:18

## 2024-10-28 RX ADMIN — NICOTINE POLACRILEX 4 MG: 4 GUM, CHEWING BUCCAL at 17:25

## 2024-10-28 RX ADMIN — DIAZEPAM 5 MG: 5 TABLET ORAL at 18:14

## 2024-10-28 ASSESSMENT — ACTIVITIES OF DAILY LIVING (ADL)
ADLS_ACUITY_SCORE: 0

## 2024-10-28 ASSESSMENT — COLUMBIA-SUICIDE SEVERITY RATING SCALE - C-SSRS
6. HAVE YOU EVER DONE ANYTHING, STARTED TO DO ANYTHING, OR PREPARED TO DO ANYTHING TO END YOUR LIFE?: NO
1. IN THE PAST MONTH, HAVE YOU WISHED YOU WERE DEAD OR WISHED YOU COULD GO TO SLEEP AND NOT WAKE UP?: NO
2. HAVE YOU ACTUALLY HAD ANY THOUGHTS OF KILLING YOURSELF IN THE PAST MONTH?: NO

## 2024-10-28 NOTE — ED NOTES
"Patient was originally given 5 mg of valium based off his MSSA scoring. Nurse gave the patient the medication and the patient shouted \"Only 5 fucking mg of valium?? Really?? I'm a fucking junky dude, I have the tolerance of a fucking horse. I need way more than 5 fucking mg.\" Nurse asked patient to please stop shouting, patient apologized and was given another 5 mg within the order set  "

## 2024-10-28 NOTE — ED NOTES
"Patient is now demanding valium, stating \"I fucking need it! Don't you see me shaking over here? I'm not trying to get high.\" MD notified, no tremors noted on examination  "

## 2024-10-28 NOTE — ED TRIAGE NOTES
Pt stating he was dropped off by police. Seeking detox, last drink was right before coming in, typically drinks 1.75 vodka daily. Hx seizure, unsure of most recent one.      Triage Assessment (Adult)       Row Name 10/28/24 9004          Triage Assessment    Airway WDL WDL        Respiratory WDL    Respiratory WDL WDL        Skin Circulation/Temperature WDL    Skin Circulation/Temperature WDL WDL        Cardiac WDL    Cardiac WDL WDL        Peripheral/Neurovascular WDL    Peripheral Neurovascular WDL WDL        Cognitive/Neuro/Behavioral WDL    Cognitive/Neuro/Behavioral WDL WDL

## 2024-10-28 NOTE — ED PROVIDER NOTES
[facility] EMERGENCY DEPARTMENT ([facility])    10/28/24      ED PROVIDER NOTE   Hallway B  History     Chief Complaint   Patient presents with    Alcohol Intoxication     Pt stating he was dropped off by police. Seeking detox, last drink was right before coming in, typically drinks 1.75 vodka daily. Hx seizure, unsure of most recent one.      The history is provided by the patient and medical records.     Kai Sims is a 48 year old male with history of bipolar affective disorder, polysubstance use disorder (alcohol, nicotine, marijuana, meth), prior history of IV drug use, chronic hepatitis C who presents to the emergency Department with alcohol intoxication seeking alcohol detox.  He states that he was dropped off by police. his  last drink was right before coming in, typically drinks 1.75 liters of vodka or a mouthwash daily.  He reports prior history of alcohol withdrawal seizure, unsure of most recent one.  No suicidal or homicidal ideation.  Patient also has been using THC.  By chart review has a history of methamphetamine use but denies any of that recently.  He denies any other medical complaints.  He denies suicidal ideation, homicidal ideation, hallucinations or psychosis.  He does not report his last withdrawal seizure was.  By chart review he has been drinking at least since August.    Past Medical History  Past Medical History:   Diagnosis Date    Bipolar affective disorder (H)     Substance abuse (H)      Past Surgical History:   Procedure Laterality Date    ENT SURGERY      ORTHOPEDIC SURGERY       gabapentin (NEURONTIN) 300 MG capsule  buprenorphine (BUTRANS) 10 MCG/HR WK patch  IBUPROFEN PO  multivitamin, therapeutic with minerals (THERA-VIT-M) TABS tablet  nicotine polacrilex 4 MG lozenge  omeprazole (PRILOSEC) 20 MG DR capsule  QUEtiapine (SEROQUEL) 100 MG tablet  QUEtiapine (SEROQUEL) 200 MG tablet  senna-docusate (SENOKOT-S;PERICOLACE) 8.6-50 MG per tablet  thiamine 100 MG  tablet      No Known Allergies  Family History  Family History   Problem Relation Age of Onset    Substance Abuse Father     Substance Abuse Sister      Social History   Social History     Tobacco Use    Smoking status: Every Day     Current packs/day: 1.00     Types: Cigarettes   Substance Use Topics    Alcohol use: Yes     Comment: 1L vodka daily    Drug use: Yes     Types: Marijuana, Methamphetamines     Comment: Clean for 15 days      Past medical history, past surgical history, medications, allergies, family history, and social history were reviewed with the patient. No additional pertinent items.   A medically appropriate review of systems was performed with pertinent positives and negatives noted in the HPI, and all other systems negative.    Physical Exam   BP: (!) 149/96  Pulse: 105  Temp: 97.4  F (36.3  C)  Resp: 16  SpO2: 96 %  Physical Exam  Physical Exam   Constitutional: Pt is oriented to person, place, and time.Pt appears well-developed and well-nourished.   HENT:   Head: Normocephalic and atraumatic.   Eyes: Conjunctivae are normal. Pupils are equal, round, and reactive to light.   Neck: Normal range of motion. Neck supple.   Cardiovascular: Normal rate, regular rhythm, normal heart sounds and intact distal pulses.    Pulmonary/Chest: Effort normal and breath sounds normal. No respiratory distress. Pt has no wheezes. Pt has no rales  Abdominal: Soft. Bowel sounds are normal. Pt exhibits no distension and no mass. No tenderness. Pt has no rebound and no guarding.   Musculoskeletal: Normal range of motion. Pt exhibits no edema.   Neurological: Pt is alert and oriented to person, place, and time. Normal gait.  Skin: Skin is warm and dry. No rash noted.   Psychiatric: Pt has a normal mood and affect. Behavior is normal. Judgment and thought content normal.      ED Course, Procedures, & Data      Procedures                Results for orders placed or performed during the hospital encounter of 10/28/24    Comprehensive metabolic panel     Status: Abnormal   Result Value Ref Range    Sodium 145 135 - 145 mmol/L    Potassium 4.2 3.4 - 5.3 mmol/L    Carbon Dioxide (CO2) 25 22 - 29 mmol/L    Anion Gap 17 (H) 7 - 15 mmol/L    Urea Nitrogen 15.3 6.0 - 20.0 mg/dL    Creatinine 0.64 (L) 0.67 - 1.17 mg/dL    GFR Estimate >90 >60 mL/min/1.73m2    Calcium 9.3 8.8 - 10.4 mg/dL    Chloride 103 98 - 107 mmol/L    Glucose 101 (H) 70 - 99 mg/dL    Alkaline Phosphatase 103 40 - 150 U/L    AST 42 0 - 45 U/L    ALT 32 0 - 70 U/L    Protein Total 8.9 (H) 6.4 - 8.3 g/dL    Albumin 4.7 3.5 - 5.2 g/dL    Bilirubin Total <0.2 <=1.2 mg/dL   Magnesium     Status: Normal   Result Value Ref Range    Magnesium 1.9 1.7 - 2.3 mg/dL   CBC with platelets and differential     Status: Abnormal   Result Value Ref Range    WBC Count 6.2 4.0 - 11.0 10e3/uL    RBC Count 5.03 4.40 - 5.90 10e6/uL    Hemoglobin 15.1 13.3 - 17.7 g/dL    Hematocrit 43.8 40.0 - 53.0 %    MCV 87 78 - 100 fL    MCH 30.0 26.5 - 33.0 pg    MCHC 34.5 31.5 - 36.5 g/dL    RDW 15.5 (H) 10.0 - 15.0 %    Platelet Count 325 150 - 450 10e3/uL    % Neutrophils 40 %    % Lymphocytes 53 %    % Monocytes 3 %    % Eosinophils 1 %    % Basophils 2 %    % Immature Granulocytes 1 %    NRBCs per 100 WBC 0 <1 /100    Absolute Neutrophils 2.5 1.6 - 8.3 10e3/uL    Absolute Lymphocytes 3.3 0.8 - 5.3 10e3/uL    Absolute Monocytes 0.2 0.0 - 1.3 10e3/uL    Absolute Eosinophils 0.1 0.0 - 0.7 10e3/uL    Absolute Basophils 0.1 0.0 - 0.2 10e3/uL    Absolute Immature Granulocytes 0.1 <=0.4 10e3/uL    Absolute NRBCs 0.0 10e3/uL   Alcohol level blood     Status: Abnormal   Result Value Ref Range    Alcohol ethyl 0.41 (HH) <=0.01 g/dL   Urine Drug Screen Panel     Status: Abnormal   Result Value Ref Range    Amphetamines Urine Screen Negative Screen Negative    Barbituates Urine Screen Negative Screen Negative    Benzodiazepine Urine Screen Negative Screen Negative    Cannabinoids Urine Screen Positive (A)  Screen Negative    Cocaine Urine Screen Negative Screen Negative    Fentanyl Qual Urine Screen Negative Screen Negative    Opiates Urine Screen Negative Screen Negative    PCP Urine Screen Negative Screen Negative   CBC with Platelets & Differential     Status: Abnormal    Narrative    The following orders were created for panel order CBC with Platelets & Differential.  Procedure                               Abnormality         Status                     ---------                               -----------         ------                     CBC with platelets and d...[[id number]]  Abnormal            Final result                 Please view results for these tests on the individual orders.   Urine Drug Screen     Status: Abnormal    Narrative    The following orders were created for panel order Urine Drug Screen.  Procedure                               Abnormality         Status                     ---------                               -----------         ------                     Urine Drug Screen Panel[[id number]]      Abnormal            Final result                 Please view results for these tests on the individual orders.     Medications   nicotine polacrilex (NICORETTE) gum 4 mg (4 mg Buccal $Given 10/28/24 1939)   diazepam (VALIUM) tablet 5-20 mg (5 mg Oral $Given 10/28/24 3178)     Labs Ordered and Resulted from Time of ED Arrival to Time of ED Departure   COMPREHENSIVE METABOLIC PANEL - Abnormal       Result Value    Sodium 145      Potassium 4.2      Carbon Dioxide (CO2) 25      Anion Gap 17 (*)     Urea Nitrogen 15.3      Creatinine 0.64 (*)     GFR Estimate >90      Calcium 9.3      Chloride 103      Glucose 101 (*)     Alkaline Phosphatase 103      AST 42      ALT 32      Protein Total 8.9 (*)     Albumin 4.7      Bilirubin Total <0.2     CBC WITH PLATELETS AND DIFFERENTIAL - Abnormal    WBC Count 6.2      RBC Count 5.03      Hemoglobin 15.1      Hematocrit 43.8      MCV 87      MCH 30.0      MCHC  34.5      RDW 15.5 (*)     Platelet Count 325      % Neutrophils 40      % Lymphocytes 53      % Monocytes 3      % Eosinophils 1      % Basophils 2      % Immature Granulocytes 1      NRBCs per 100 WBC 0      Absolute Neutrophils 2.5      Absolute Lymphocytes 3.3      Absolute Monocytes 0.2      Absolute Eosinophils 0.1      Absolute Basophils 0.1      Absolute Immature Granulocytes 0.1      Absolute NRBCs 0.0     ETHYL ALCOHOL LEVEL - Abnormal    Alcohol ethyl 0.41 (*)    URINE DRUG SCREEN PANEL - Abnormal    Amphetamines Urine Screen Negative      Barbituates Urine Screen Negative      Benzodiazepine Urine Screen Negative      Cannabinoids Urine Screen Positive (*)     Cocaine Urine Screen Negative      Fentanyl Qual Urine Screen Negative      Opiates Urine Screen Negative      PCP Urine Screen Negative     MAGNESIUM - Normal    Magnesium 1.9       No orders to display          Critical care was not performed.     Medical Decision Making  The patient's presentation was of high complexity (a chronic illness severe exacerbation, progression, or side effect of treatment).    The patient's evaluation involved:  review of external note(s) from 1 sources (see separate area of note for details)  review of 3+ test result(s) ordered prior to this encounter (see separate area of note for details)  ordering and/or review of 3+ test(s) in this encounter (see separate area of note for details)    The patient's management necessitated high risk (a decision regarding hospitalization).    Assessment & Plan    Kai Sims is a 48 year old male with history of bipolar affective disorder, polysubstance use disorder (alcohol, nicotine, marijuana, meth), prior history of IV drug use, chronic hepatitis C who presents to the emergency Department with alcohol intoxication seeking alcohol detox.  Patient is nontoxic-appearing on exam, ambulating in the room and answer questions appropriately, though his initial vital signs in triage  did show an elevated blood pressure at 149/96 and an elevated heart rate at 105.  He appears comfortable to me however he complains that he is undergoing early withdrawal.  His blood work level was 0.41 which is quite high for early withdrawal.  He was put on the MSSA protocol with PRN Valium.  Patient would like to pursue detox therapy.  He was admitted to our detox center for further evaluation and management.    UPDATE: After discussing with detox, they declined the patient based on history of possible aggressive behavior.  I informed the patient and he would prefer discharge rather than looking for another detox center.  Patient is ambulatory, eating and drinking, was discharged with outpatient follow-up and return precautions.    I have reviewed the nursing notes. I have reviewed the findings, diagnosis, plan and need for follow up with the patient.    New Prescriptions    No medications on file       Final diagnoses:   Admitted to substance misuse detoxification center   Alcoholic intoxication without complication (H)   Alcohol withdrawal seizure without complication (H)     Heber Ndiaye MD  [facility] EMERGENCY DEPARTMENT  10/28/2024           Heber Ndiaye MD  10/28/24 2108       Heber Ndiaye MD  10/28/24 2133

## 2024-10-28 NOTE — ED NOTES
Patient has been repeatedly coming out of his room asking if/when he is going to detox. Patient has been reminded several times by myself and others that he needs to be seen before going up the detox. Patient seemingly forgets this and asks every 5-10 minutes, but is relatively calm

## 2024-10-29 NOTE — CONSULTS
Chart reviewed for admission criteria for detox.  Patient declined for 3A/detox due to hx of aggression requiring restraints and his verbal aggression in the ED.

## 2025-01-04 ENCOUNTER — HOSPITAL ENCOUNTER (EMERGENCY)
Facility: CLINIC | Age: 50
Discharge: HOME | End: 2025-01-04
Attending: EMERGENCY MEDICINE | Admitting: EMERGENCY MEDICINE
Payer: COMMERCIAL

## 2025-01-04 VITALS
SYSTOLIC BLOOD PRESSURE: 161 MMHG | WEIGHT: 150 LBS | OXYGEN SATURATION: 97 % | TEMPERATURE: 98 F | DIASTOLIC BLOOD PRESSURE: 109 MMHG | HEIGHT: 67 IN | BODY MASS INDEX: 23.54 KG/M2 | RESPIRATION RATE: 16 BRPM | HEART RATE: 103 BPM

## 2025-01-04 DIAGNOSIS — F10.920 ALCOHOLIC INTOXICATION WITHOUT COMPLICATION: ICD-10-CM

## 2025-01-04 DIAGNOSIS — T33.521A FROSTBITE OF BOTH HANDS: ICD-10-CM

## 2025-01-04 DIAGNOSIS — F10.90 ALCOHOL USE DISORDER: ICD-10-CM

## 2025-01-04 DIAGNOSIS — T33.522A FROSTBITE OF BOTH HANDS: ICD-10-CM

## 2025-01-04 LAB
ALBUMIN SERPL BCG-MCNC: 4.6 G/DL (ref 3.5–5.2)
ALP SERPL-CCNC: 86 U/L (ref 40–150)
ALT SERPL W P-5'-P-CCNC: 43 U/L (ref 0–70)
ANION GAP SERPL CALCULATED.3IONS-SCNC: 18 MMOL/L (ref 7–15)
AST SERPL W P-5'-P-CCNC: 64 U/L (ref 0–45)
BASOPHILS # BLD AUTO: 0.1 10E3/UL (ref 0–0.2)
BASOPHILS NFR BLD AUTO: 1 %
BILIRUB SERPL-MCNC: 0.3 MG/DL
BUN SERPL-MCNC: 19.8 MG/DL (ref 6–20)
CALCIUM SERPL-MCNC: 9.3 MG/DL (ref 8.8–10.4)
CHLORIDE SERPL-SCNC: 96 MMOL/L (ref 98–107)
CREAT SERPL-MCNC: 0.6 MG/DL (ref 0.67–1.17)
EGFRCR SERPLBLD CKD-EPI 2021: >90 ML/MIN/1.73M2
EOSINOPHIL # BLD AUTO: 0.1 10E3/UL (ref 0–0.7)
EOSINOPHIL NFR BLD AUTO: 1 %
ERYTHROCYTE [DISTWIDTH] IN BLOOD BY AUTOMATED COUNT: 13.5 % (ref 10–15)
ETHANOL SERPL-MCNC: 0.35 G/DL
GLUCOSE SERPL-MCNC: 80 MG/DL (ref 70–99)
HCO3 SERPL-SCNC: 24 MMOL/L (ref 22–29)
HCT VFR BLD AUTO: 41.8 % (ref 40–53)
HGB BLD-MCNC: 14.7 G/DL (ref 13.3–17.7)
IMM GRANULOCYTES # BLD: 0 10E3/UL
IMM GRANULOCYTES NFR BLD: 0 %
LIPASE SERPL-CCNC: 50 U/L (ref 13–60)
LYMPHOCYTES # BLD AUTO: 3.1 10E3/UL (ref 0.8–5.3)
LYMPHOCYTES NFR BLD AUTO: 42 %
MAGNESIUM SERPL-MCNC: 1.9 MG/DL (ref 1.7–2.3)
MCH RBC QN AUTO: 30.3 PG (ref 26.5–33)
MCHC RBC AUTO-ENTMCNC: 35.2 G/DL (ref 31.5–36.5)
MCV RBC AUTO: 86 FL (ref 78–100)
MONOCYTES # BLD AUTO: 0.5 10E3/UL (ref 0–1.3)
MONOCYTES NFR BLD AUTO: 7 %
NEUTROPHILS # BLD AUTO: 3.8 10E3/UL (ref 1.6–8.3)
NEUTROPHILS NFR BLD AUTO: 50 %
NRBC # BLD AUTO: 0 10E3/UL
NRBC BLD AUTO-RTO: 0 /100
PLATELET # BLD AUTO: 233 10E3/UL (ref 150–450)
POTASSIUM SERPL-SCNC: 4.3 MMOL/L (ref 3.4–5.3)
PROT SERPL-MCNC: 7.7 G/DL (ref 6.4–8.3)
RBC # BLD AUTO: 4.85 10E6/UL (ref 4.4–5.9)
SODIUM SERPL-SCNC: 138 MMOL/L (ref 135–145)
WBC # BLD AUTO: 7.6 10E3/UL (ref 4–11)

## 2025-01-04 PROCEDURE — 36415 COLL VENOUS BLD VENIPUNCTURE: CPT | Performed by: EMERGENCY MEDICINE

## 2025-01-04 PROCEDURE — 84132 ASSAY OF SERUM POTASSIUM: CPT | Performed by: EMERGENCY MEDICINE

## 2025-01-04 PROCEDURE — 99283 EMERGENCY DEPT VISIT LOW MDM: CPT

## 2025-01-04 PROCEDURE — 85018 HEMOGLOBIN: CPT | Performed by: EMERGENCY MEDICINE

## 2025-01-04 PROCEDURE — 85004 AUTOMATED DIFF WBC COUNT: CPT | Performed by: EMERGENCY MEDICINE

## 2025-01-04 PROCEDURE — 83690 ASSAY OF LIPASE: CPT | Performed by: EMERGENCY MEDICINE

## 2025-01-04 PROCEDURE — 82947 ASSAY GLUCOSE BLOOD QUANT: CPT | Performed by: EMERGENCY MEDICINE

## 2025-01-04 PROCEDURE — 83735 ASSAY OF MAGNESIUM: CPT | Performed by: EMERGENCY MEDICINE

## 2025-01-04 PROCEDURE — 84460 ALANINE AMINO (ALT) (SGPT): CPT | Performed by: EMERGENCY MEDICINE

## 2025-01-04 PROCEDURE — 82077 ASSAY SPEC XCP UR&BREATH IA: CPT | Performed by: EMERGENCY MEDICINE

## 2025-01-04 PROCEDURE — 250N000013 HC RX MED GY IP 250 OP 250 PS 637: Performed by: EMERGENCY MEDICINE

## 2025-01-04 RX ORDER — OXYCODONE HYDROCHLORIDE 5 MG/1
5 TABLET ORAL ONCE
Status: COMPLETED | OUTPATIENT
Start: 2025-01-04 | End: 2025-01-04

## 2025-01-04 RX ORDER — GABAPENTIN 600 MG/1
1200 TABLET ORAL ONCE
Status: COMPLETED | OUTPATIENT
Start: 2025-01-04 | End: 2025-01-04

## 2025-01-04 RX ORDER — IBUPROFEN 600 MG/1
600 TABLET, FILM COATED ORAL ONCE
Status: COMPLETED | OUTPATIENT
Start: 2025-01-04 | End: 2025-01-04

## 2025-01-04 RX ORDER — LORAZEPAM 1 MG/1
1-2 TABLET ORAL EVERY 30 MIN PRN
Status: DISCONTINUED | OUTPATIENT
Start: 2025-01-04 | End: 2025-01-05 | Stop reason: HOSPADM

## 2025-01-04 RX ORDER — MULTIPLE VITAMINS W/ MINERALS TAB 9MG-400MCG
1 TAB ORAL DAILY
Status: DISCONTINUED | OUTPATIENT
Start: 2025-01-04 | End: 2025-01-05 | Stop reason: HOSPADM

## 2025-01-04 RX ORDER — FLUMAZENIL 0.1 MG/ML
0.2 INJECTION, SOLUTION INTRAVENOUS
Status: DISCONTINUED | OUTPATIENT
Start: 2025-01-04 | End: 2025-01-05 | Stop reason: HOSPADM

## 2025-01-04 RX ORDER — LORAZEPAM 2 MG/ML
1-2 INJECTION INTRAMUSCULAR EVERY 30 MIN PRN
Status: DISCONTINUED | OUTPATIENT
Start: 2025-01-04 | End: 2025-01-05 | Stop reason: HOSPADM

## 2025-01-04 RX ORDER — CLONIDINE HYDROCHLORIDE 0.1 MG/1
0.1 TABLET ORAL EVERY 8 HOURS
Status: DISCONTINUED | OUTPATIENT
Start: 2025-01-04 | End: 2025-01-05 | Stop reason: HOSPADM

## 2025-01-04 RX ORDER — HALOPERIDOL 5 MG/ML
2.5-5 INJECTION INTRAMUSCULAR EVERY 6 HOURS PRN
Status: DISCONTINUED | OUTPATIENT
Start: 2025-01-04 | End: 2025-01-05 | Stop reason: HOSPADM

## 2025-01-04 RX ORDER — OLANZAPINE 5 MG/1
5-10 TABLET, ORALLY DISINTEGRATING ORAL EVERY 6 HOURS PRN
Status: DISCONTINUED | OUTPATIENT
Start: 2025-01-04 | End: 2025-01-05 | Stop reason: HOSPADM

## 2025-01-04 RX ORDER — FOLIC ACID 1 MG/1
1 TABLET ORAL DAILY
Status: DISCONTINUED | OUTPATIENT
Start: 2025-01-04 | End: 2025-01-05 | Stop reason: HOSPADM

## 2025-01-04 RX ORDER — GABAPENTIN 300 MG/1
300 CAPSULE ORAL 3 TIMES DAILY PRN
Qty: 60 CAPSULE | Refills: 0 | Status: SHIPPED | OUTPATIENT
Start: 2025-01-04

## 2025-01-04 RX ORDER — POLYETHYLENE GLYCOL 3350 17 G
2 POWDER IN PACKET (EA) ORAL
Status: DISCONTINUED | OUTPATIENT
Start: 2025-01-04 | End: 2025-01-05 | Stop reason: HOSPADM

## 2025-01-04 RX ORDER — NICOTINE 21 MG/24HR
1 PATCH, TRANSDERMAL 24 HOURS TRANSDERMAL DAILY
Status: DISCONTINUED | OUTPATIENT
Start: 2025-01-04 | End: 2025-01-05 | Stop reason: HOSPADM

## 2025-01-04 RX ADMIN — THIAMINE HCL TAB 100 MG 100 MG: 100 TAB at 20:03

## 2025-01-04 RX ADMIN — OXYCODONE HYDROCHLORIDE 5 MG: 5 TABLET ORAL at 20:37

## 2025-01-04 RX ADMIN — GABAPENTIN 1200 MG: 600 TABLET, FILM COATED ORAL at 20:01

## 2025-01-04 RX ADMIN — IBUPROFEN 600 MG: 600 TABLET ORAL at 20:01

## 2025-01-04 RX ADMIN — CLONIDINE HYDROCHLORIDE 0.1 MG: 0.1 TABLET ORAL at 20:03

## 2025-01-04 RX ADMIN — Medication 1 TABLET: at 20:03

## 2025-01-04 RX ADMIN — FOLIC ACID 1 MG: 1 TABLET ORAL at 20:03

## 2025-01-04 RX ADMIN — NICOTINE 1 PATCH: 21 PATCH, EXTENDED RELEASE TRANSDERMAL at 20:06

## 2025-01-04 ASSESSMENT — ACTIVITIES OF DAILY LIVING (ADL)
ADLS_ACUITY_SCORE: 49

## 2025-01-04 ASSESSMENT — COLUMBIA-SUICIDE SEVERITY RATING SCALE - C-SSRS
6. HAVE YOU EVER DONE ANYTHING, STARTED TO DO ANYTHING, OR PREPARED TO DO ANYTHING TO END YOUR LIFE?: NO
2. HAVE YOU ACTUALLY HAD ANY THOUGHTS OF KILLING YOURSELF IN THE PAST MONTH?: NO
1. IN THE PAST MONTH, HAVE YOU WISHED YOU WERE DEAD OR WISHED YOU COULD GO TO SLEEP AND NOT WAKE UP?: NO

## 2025-01-05 NOTE — ED TRIAGE NOTES
Patient presents with reports of going to [facility] in [place] for withdrawal of alcohol. Was sent to ED to eval for frostbite on multiple fingers. Patient reports last drink of alcohol at 5 pm.     Triage Assessment (Adult)       Row Name 01/04/25 1858          Triage Assessment    Airway WDL WDL        Respiratory WDL    Respiratory WDL WDL        Skin Circulation/Temperature WDL    Skin Circulation/Temperature WDL WDL        Cardiac WDL    Cardiac WDL WDL        Peripheral/Neurovascular WDL    Peripheral Neurovascular WDL WDL        Cognitive/Neuro/Behavioral WDL    Cognitive/Neuro/Behavioral WDL WDL

## 2025-01-05 NOTE — ED PROVIDER NOTES
"  Emergency Department Note      History of Present Illness     Chief Complaint   Frostbite (Patient presents with reports of going to [facility] in [place] for withdrawal of alcohol. Was sent to ED to al for frostbite on multiple fingers. Patient reports last drink of alcohol at 5 pm.)      HPI   Kai Sims is a 49 year old male with a history of frostbite, alcohol and drug use presenting to the ED for frostbite. Patient is accompanied by friend who reports that he was going to [facility] for alcohol withdrawal, but was sent to the ED due to their concerns of his frostbite and current alcohol content was high. Patient reports pain in his fingers and about 2 episodes of vomiting. He reports drinking 1 bottle (1.75 L) and smoking 1 pack daily, last drink was at 1700 today. He endorses a history of alcohol withdrawal seizures. He denies any current hallucinations.    Independent Historian   Friend as detailed above.    Review of External Notes   See ED course     Past Medical History     Medical History and Problem List   Substance abuse  Bipolar disorder  Frostbite   Ataxia  Wernicke-Korsakoff syndrome  Neurocognitive disorder  Drug-induced psychotic disorder with hallucinations  Hematemesis   Cocaine use  Tobacco use   Methamphetamine use   Opioid use  Homelessness  LTBI  TBI  Cannabis dependence  Chronic hepatitis C  Hx of PPD positive  Intracerebral hemorrhage  Diplopia  Atrial flutter  Personality disorder  Anxiety     Medications   Neurontin  Roxicodone  Nicotine polacrilex lozenge  Prilosec  Seroquel  Senokot-S  Thiamine    Surgical History   ENT   Orthopedic  Amputation of fingers    Physical Exam     Patient Vitals for the past 24 hrs:   BP Temp Temp src Pulse Resp SpO2 Height Weight   01/04/25 1900 (!) 161/109 98  F (36.7  C) Temporal 103 16 97 % 1.702 m (5' 7\") 68 kg (150 lb)     Physical Exam  Eyes:               Sclera white; Pupils are equal and round  ENT:                External ears and " nares normal  CV:                  Rate as above with regular rhythm   Resp:               Breath sounds clear and equal bilaterally                          Non-labored, no retractions or accessory muscle use  GI:                   Abdomen is soft, non-tender, non-distended                          No rebound tenderness or peritoneal features  MS:                  Moves all extremities  Skin:                Warm and dry                          Eschar on distal 2nd left digit, smaller discolored areas at tips of several fingers, all appear chronic with some improved areas vs most recent burn center visit  Neuro:             Speech is normal and fluent.   Psych:             Perseverating on finger pain        Diagnostics     Lab Results   Labs Ordered and Resulted from Time of ED Arrival to Time of ED Departure   COMPREHENSIVE METABOLIC PANEL - Abnormal       Result Value    Sodium 138      Potassium 4.3      Carbon Dioxide (CO2) 24      Anion Gap 18 (*)     Urea Nitrogen 19.8      Creatinine 0.60 (*)     GFR Estimate >90      Calcium 9.3      Chloride 96 (*)     Glucose 80      Alkaline Phosphatase 86      AST 64 (*)     ALT 43      Protein Total 7.7      Albumin 4.6      Bilirubin Total 0.3     ETHYL ALCOHOL LEVEL - Abnormal    Alcohol ethyl 0.35 (*)    LIPASE - Normal    Lipase 50     MAGNESIUM - Normal    Magnesium 1.9     CBC WITH PLATELETS AND DIFFERENTIAL    WBC Count 7.6      RBC Count 4.85      Hemoglobin 14.7      Hematocrit 41.8      MCV 86      MCH 30.3      MCHC 35.2      RDW 13.5      Platelet Count 233      % Neutrophils 50      % Lymphocytes 42      % Monocytes 7      % Eosinophils 1      % Basophils 1      % Immature Granulocytes 0      NRBCs per 100 WBC 0      Absolute Neutrophils 3.8      Absolute Lymphocytes 3.1      Absolute Monocytes 0.5      Absolute Eosinophils 0.1      Absolute Basophils 0.1      Absolute Immature Granulocytes 0.0      Absolute NRBCs 0.0         Imaging   No orders to  display       EKG   None     Independent Interpretation   None    ED Course      Medications Administered   Medications   OLANZapine zydis (zyPREXA) ODT tab 5-10 mg (has no administration in time range)     Or   haloperidol lactate (HALDOL) injection 2.5-5 mg (has no administration in time range)   flumazenil (ROMAZICON) injection 0.2 mg (has no administration in time range)   melatonin tablet 5 mg (has no administration in time range)   cloNIDine (CATAPRES) tablet 0.1 mg (0.1 mg Oral $Given 1/4/25 2003)   flumazenil (ROMAZICON) injection 0.2 mg (has no administration in time range)   melatonin tablet 5 mg (has no administration in time range)   multivitamin w/minerals (THERA-VIT-M) tablet 1 tablet (1 tablet Oral $Given 1/4/25 2003)   thiamine (B-1) tablet 100 mg (100 mg Oral $Given 1/4/25 2003)   folic acid (FOLVITE) tablet 1 mg (1 mg Oral $Given 1/4/25 2003)   LORazepam (ATIVAN) tablet 1-2 mg (has no administration in time range)     Or   LORazepam (ATIVAN) injection 1-2 mg (has no administration in time range)   nicotine (NICODERM CQ) 21 MG/24HR 24 hr patch 1 patch (1 patch Transdermal $Patch/Med Applied 1/4/25 2006)   nicotine (COMMIT) lozenge 2 mg (has no administration in time range)   gabapentin (NEURONTIN) tablet 1,200 mg (1,200 mg Oral $Given 1/4/25 2001)   ibuprofen (ADVIL/MOTRIN) tablet 600 mg (600 mg Oral $Given 1/4/25 2001)   oxyCODONE (ROXICODONE) tablet 5 mg (5 mg Oral $Given 1/4/25 2037)       Procedures   Procedures     Discussion of Management   None    ED Course   ED Course as of 01/04/25 2122   Sat Jan 04, 2025 1920 Chart reviewed with burning visits to [facility] in the middle of December.  They have seen him multiple times over the years for frostbite and has had several rounds of amputations over the years as well.   1922 I initially assessed the patient and obtained the above history and physical exam.    1935 PDMP reviewed.  He received 6 tablets of oxycodone most recently 12/17/2024        Additional Documentation  None    Medical Decision Making / Diagnosis     CMS Diagnoses: None    MIPS       None    MDM   Appearance of his fingers is as expected progression given his most recent pictures of frostbite available at [facility].  He has an area that looks like a chronic eschar which will need burn center follow-up for debridement versus amputation.  There are no acute complications related to this and no areas that look like new acute frostbite.  Pain medications were ordered.  [facility] protocol was put into place.  Labs are being checked for medical clearance for detox.  Unless there are severe abnormalities, then detox will be an appropriate disposition.  Labs reviewed, no indication for admission.  On recheck he is ambulatory and conversant and upset that he cannot go smoke a cigarette.  [place] detox has a bed available.  Discharged.    Disposition   The patient was discharged.     Diagnosis     ICD-10-CM    1. Alcohol use disorder  F10.90       2. Alcoholic intoxication without complication (H)  F10.920       3. Frostbite of both hands  T33.521A     T33.522A     Chronic w/eschar - follow up at burn clinic           Discharge Medications   New Prescriptions    GABAPENTIN (NEURONTIN) 300 MG CAPSULE    Take 1 capsule (300 mg) by mouth 3 times daily as needed (pain, alcohol cravings).     Scribe Disclosure:  I, Aryan Gross, am serving as a scribe  for Oscar Randall, at 8:21 PM on 1/4/2025 to document services personally performed by Kaykay Galvez MD, based on my observations and the provider's statements to me.        Kaykay Galvez MD  01/05/25 3591

## 2025-01-05 NOTE — DISCHARGE INSTRUCTIONS
Follow up with the burn clinic you have seen before in order to arrange definitive management for your chronic changes from past frost bite.    Prescription strength dosing instructions:  - 600mg ibuprofen (Advil, Motrin) every 6 hours as needed for fever/pain/inflammation.  Naprosyn (Naproxen, Aleve) works similarly and can be used instead, if preferred.  - 650mg acetaminophen (tylenol) every 4-6 hours or 1000mg every 6-8 hours (maximum of 3000mg in 24 hours).  Acetaminophen is often in combination over the counter and prescription pain medications.  Be sure to include this in daily total.    These medications work differently and can be used in combination.

## 2025-04-23 ENCOUNTER — HOSPITAL ENCOUNTER (INPATIENT)
Facility: CLINIC | Age: 50
DRG: 897 | End: 2025-04-23
Attending: FAMILY MEDICINE | Admitting: PSYCHIATRY & NEUROLOGY
Payer: COMMERCIAL

## 2025-04-23 ENCOUNTER — TELEPHONE (OUTPATIENT)
Dept: BEHAVIORAL HEALTH | Facility: CLINIC | Age: 50
End: 2025-04-23

## 2025-04-23 DIAGNOSIS — F10.90 ALCOHOL USE DISORDER: ICD-10-CM

## 2025-04-23 DIAGNOSIS — F39 UNSPECIFIED MOOD (AFFECTIVE) DISORDER: Primary | Chronic | ICD-10-CM

## 2025-04-23 DIAGNOSIS — E87.29 ALCOHOLIC KETOACIDOSIS: ICD-10-CM

## 2025-04-23 DIAGNOSIS — F10.920: ICD-10-CM
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
## 2025-04-23 LAB
ALBUMIN SERPL BCG-MCNC: 4.3 G/DL (ref 3.5–5.2)
ALP SERPL-CCNC: 85 U/L (ref 40–150)
ALT SERPL W P-5'-P-CCNC: 49 U/L (ref 0–70)
AMPHETAMINES UR QL SCN: ABNORMAL
ANION GAP SERPL CALCULATED.3IONS-SCNC: 14 MMOL/L (ref 7–15)
AST SERPL W P-5'-P-CCNC: 66 U/L (ref 0–45)
BARBITURATES UR QL SCN: ABNORMAL
BASOPHILS # BLD AUTO: 0.1 10E3/UL (ref 0–0.2)
BASOPHILS NFR BLD AUTO: 1 %
BENZODIAZ UR QL SCN: ABNORMAL
BILIRUB SERPL-MCNC: 0.2 MG/DL
BUN SERPL-MCNC: 16.6 MG/DL (ref 6–20)
BZE UR QL SCN: ABNORMAL
CALCIUM SERPL-MCNC: 8.7 MG/DL (ref 8.8–10.4)
CANNABINOIDS UR QL SCN: ABNORMAL
CHLORIDE SERPL-SCNC: 101 MMOL/L (ref 98–107)
CREAT SERPL-MCNC: 0.63 MG/DL (ref 0.67–1.17)
EGFRCR SERPLBLD CKD-EPI 2021: >90 ML/MIN/1.73M2
EOSINOPHIL # BLD AUTO: 0.1 10E3/UL (ref 0–0.7)
EOSINOPHIL NFR BLD AUTO: 2 %
ERYTHROCYTE [DISTWIDTH] IN BLOOD BY AUTOMATED COUNT: 13.3 % (ref 10–15)
ETHANOL SERPL-MCNC: 0.41 G/DL
FENTANYL UR QL: ABNORMAL
GLUCOSE SERPL-MCNC: 93 MG/DL (ref 70–99)
HCO3 SERPL-SCNC: 24 MMOL/L (ref 22–29)
HCT VFR BLD AUTO: 38.7 % (ref 40–53)
HGB BLD-MCNC: 13.5 G/DL (ref 13.3–17.7)
HOLD SPECIMEN: NORMAL
IMM GRANULOCYTES # BLD: 0 10E3/UL
IMM GRANULOCYTES NFR BLD: 0 %
LIPASE SERPL-CCNC: 37 U/L (ref 13–60)
LYMPHOCYTES # BLD AUTO: 2.7 10E3/UL (ref 0.8–5.3)
LYMPHOCYTES NFR BLD AUTO: 45 %
MAGNESIUM SERPL-MCNC: 1.5 MG/DL (ref 1.7–2.3)
MCH RBC QN AUTO: 30.6 PG (ref 26.5–33)
MCHC RBC AUTO-ENTMCNC: 34.9 G/DL (ref 31.5–36.5)
MCV RBC AUTO: 88 FL (ref 78–100)
MONOCYTES # BLD AUTO: 0.3 10E3/UL (ref 0–1.3)
MONOCYTES NFR BLD AUTO: 4 %
NEUTROPHILS # BLD AUTO: 2.8 10E3/UL (ref 1.6–8.3)
NEUTROPHILS NFR BLD AUTO: 47 %
NRBC # BLD AUTO: 0 10E3/UL
NRBC BLD AUTO-RTO: 0 /100
OPIATES UR QL SCN: ABNORMAL
PCP QUAL URINE (ROCHE): ABNORMAL
PLATELET # BLD AUTO: 255 10E3/UL (ref 150–450)
POTASSIUM SERPL-SCNC: 3.4 MMOL/L (ref 3.4–5.3)
PROT SERPL-MCNC: 7.3 G/DL (ref 6.4–8.3)
RBC # BLD AUTO: 4.41 10E6/UL (ref 4.4–5.9)
SODIUM SERPL-SCNC: 139 MMOL/L (ref 135–145)
WBC # BLD AUTO: 5.9 10E3/UL (ref 4–11)

## 2025-04-23 PROCEDURE — 84439 ASSAY OF FREE THYROXINE: CPT | Performed by: NURSE PRACTITIONER

## 2025-04-23 PROCEDURE — 83690 ASSAY OF LIPASE: CPT | Performed by: FAMILY MEDICINE

## 2025-04-23 PROCEDURE — 99285 EMERGENCY DEPT VISIT HI MDM: CPT | Performed by: FAMILY MEDICINE

## 2025-04-23 PROCEDURE — 82077 ASSAY SPEC XCP UR&BREATH IA: CPT | Performed by: FAMILY MEDICINE

## 2025-04-23 PROCEDURE — 36415 COLL VENOUS BLD VENIPUNCTURE: CPT | Performed by: FAMILY MEDICINE

## 2025-04-23 PROCEDURE — 82977 ASSAY OF GGT: CPT | Performed by: NURSE PRACTITIONER

## 2025-04-23 PROCEDURE — 83735 ASSAY OF MAGNESIUM: CPT | Performed by: FAMILY MEDICINE

## 2025-04-23 PROCEDURE — 84443 ASSAY THYROID STIM HORMONE: CPT | Performed by: NURSE PRACTITIONER

## 2025-04-23 PROCEDURE — 85004 AUTOMATED DIFF WBC COUNT: CPT | Performed by: FAMILY MEDICINE

## 2025-04-23 PROCEDURE — 80053 COMPREHEN METABOLIC PANEL: CPT | Performed by: FAMILY MEDICINE

## 2025-04-23 PROCEDURE — 80307 DRUG TEST PRSMV CHEM ANLYZR: CPT | Performed by: FAMILY MEDICINE

## 2025-04-23 PROCEDURE — 99207 PR NO CHARGE LOS: CPT | Performed by: NURSE PRACTITIONER

## 2025-04-23 RX ORDER — DIAZEPAM 5 MG/1
5-20 TABLET ORAL EVERY 30 MIN PRN
Status: DISCONTINUED | OUTPATIENT
Start: 2025-04-23 | End: 2025-04-28 | Stop reason: HOSPADM

## 2025-04-23 ASSESSMENT — ACTIVITIES OF DAILY LIVING (ADL)
ADLS_ACUITY_SCORE: 49

## 2025-04-23 ASSESSMENT — COLUMBIA-SUICIDE SEVERITY RATING SCALE - C-SSRS
1. IN THE PAST MONTH, HAVE YOU WISHED YOU WERE DEAD OR WISHED YOU COULD GO TO SLEEP AND NOT WAKE UP?: NO
6. HAVE YOU EVER DONE ANYTHING, STARTED TO DO ANYTHING, OR PREPARED TO DO ANYTHING TO END YOUR LIFE?: NO
2. HAVE YOU ACTUALLY HAD ANY THOUGHTS OF KILLING YOURSELF IN THE PAST MONTH?: NO

## 2025-04-23 NOTE — PROGRESS NOTES
Patient is very intoxicated. During search large bottle of mouth wash, and needle was found. Patient asked for sandwith sleeping comfortably.

## 2025-04-23 NOTE — CONSULTS
Brief Psychiatry Note   Chart and labs reviewed. Patient meets criteria for 3A detox admission to detox from [facility], pending completed magnesium replacement protocol and obtaining a temperature wnl. Orders for admission placed.   Addendum:   T 98.4.  Current exclusion criteria does not require mag level recheck.     Most Recent 2 LFT's:  Recent Labs   Lab Test 04/23/25  1557 01/04/25 1953   AST 66* 64*   ALT 49 43   ALKPHOS 85 86   BILITOTAL 0.2 0.3       Mell Pugh, MARTHA CNP

## 2025-04-23 NOTE — ED PROVIDER NOTES
[facility] [place] ([facility])    4/23/25      ED PROVIDER NOTE   ED 11  History     Chief Complaint   Patient presents with    Alcohol Intoxication     The history is provided by the patient and medical records.     Kai Sims is a 49 year old male with history of bipolar 1 disorder, prior history of hepatitis C, methamphetamine use who presents to the emergency department with alcohol intoxication.  Patient has been drinking nearly a liter of alcohol a day he states that he wants to be sober he does note a history of withdrawal seizures his last seizure being just over a month ago.  Patient denies any significant DT history.  He has a history of methamphetamine use in the past however he denies any active drug use at this time.  Patient also denies any other acute medical concerns or acute psychiatric concerns with no suicidal homicidal or self-injurious thoughts.    Past Medical History  Past Medical History:   Diagnosis Date    Bipolar affective disorder (H)     Substance abuse (H)      Past Surgical History:   Procedure Laterality Date    ENT SURGERY      ORTHOPEDIC SURGERY       buprenorphine (BUTRANS) 10 MCG/HR WK patch  gabapentin (NEURONTIN) 300 MG capsule  gabapentin (NEURONTIN) 300 MG capsule  IBUPROFEN PO  multivitamin, therapeutic with minerals (THERA-VIT-M) TABS tablet  nicotine polacrilex 4 MG lozenge  omeprazole (PRILOSEC) 20 MG DR capsule  QUEtiapine (SEROQUEL) 100 MG tablet  QUEtiapine (SEROQUEL) 200 MG tablet  senna-docusate (SENOKOT-S;PERICOLACE) 8.6-50 MG per tablet  thiamine 100 MG tablet      No Known Allergies  Family History  Family History   Problem Relation Age of Onset    Substance Abuse Father     Substance Abuse Sister      Social History   Social History     Tobacco Use    Smoking status: Every Day     Current packs/day: 1.00     Types: Cigarettes   Substance Use Topics    Alcohol use: Yes     Comment: 1L vodka daily    Drug use: Yes     Types: Marijuana,  Methamphetamines     Comment: Clean for 15 days      A medically appropriate review of systems was performed with pertinent positives and negatives noted in the HPI, and all other systems negative.    Physical Exam   BP: 129/89  Pulse: 111  Resp: 16  SpO2: 98 %  Physical Exam  Constitutional:       General: He is not in acute distress.     Appearance: Normal appearance. He is not toxic-appearing.   HENT:      Head: Atraumatic.   Eyes:      General: No scleral icterus.     Conjunctiva/sclera: Conjunctivae normal.   Cardiovascular:      Rate and Rhythm: Normal rate.      Heart sounds: Normal heart sounds.   Pulmonary:      Effort: Pulmonary effort is normal. No respiratory distress.      Breath sounds: Normal breath sounds.   Abdominal:      Palpations: Abdomen is soft.      Tenderness: There is no abdominal tenderness.   Musculoskeletal:         General: No deformity.      Cervical back: Neck supple.   Skin:     General: Skin is warm.   Neurological:      General: No focal deficit present.      Mental Status: He is alert and oriented to person, place, and time.      Sensory: No sensory deficit.      Motor: No weakness.      Coordination: Coordination normal.           ED Course, Procedures, & Data      Procedures    Results for orders placed or performed during the hospital encounter of 04/23/25   Comprehensive metabolic panel     Status: Abnormal   Result Value Ref Range    Sodium 139 135 - 145 mmol/L    Potassium 3.4 3.4 - 5.3 mmol/L    Carbon Dioxide (CO2) 24 22 - 29 mmol/L    Anion Gap 14 7 - 15 mmol/L    Urea Nitrogen 16.6 6.0 - 20.0 mg/dL    Creatinine 0.63 (L) 0.67 - 1.17 mg/dL    GFR Estimate >90 >60 mL/min/1.73m2    Calcium 8.7 (L) 8.8 - 10.4 mg/dL    Chloride 101 98 - 107 mmol/L    Glucose 93 70 - 99 mg/dL    Alkaline Phosphatase 85 40 - 150 U/L    AST 66 (H) 0 - 45 U/L    ALT 49 0 - 70 U/L    Protein Total 7.3 6.4 - 8.3 g/dL    Albumin 4.3 3.5 - 5.2 g/dL    Bilirubin Total 0.2 <=1.2 mg/dL   Magnesium      Status: Abnormal   Result Value Ref Range    Magnesium 1.5 (L) 1.7 - 2.3 mg/dL   Lipase     Status: Normal   Result Value Ref Range    Lipase 37 13 - 60 U/L   Ethyl Alcohol Level     Status: Abnormal   Result Value Ref Range    Alcohol ethyl 0.41 (HH) <=0.01 g/dL   CBC with platelets and differential     Status: Abnormal   Result Value Ref Range    WBC Count 5.9 4.0 - 11.0 10e3/uL    RBC Count 4.41 4.40 - 5.90 10e6/uL    Hemoglobin 13.5 13.3 - 17.7 g/dL    Hematocrit 38.7 (L) 40.0 - 53.0 %    MCV 88 78 - 100 fL    MCH 30.6 26.5 - 33.0 pg    MCHC 34.9 31.5 - 36.5 g/dL    RDW 13.3 10.0 - 15.0 %    Platelet Count 255 150 - 450 10e3/uL    % Neutrophils 47 %    % Lymphocytes 45 %    % Monocytes 4 %    % Eosinophils 2 %    % Basophils 1 %    % Immature Granulocytes 0 %    NRBCs per 100 WBC 0 <1 /100    Absolute Neutrophils 2.8 1.6 - 8.3 10e3/uL    Absolute Lymphocytes 2.7 0.8 - 5.3 10e3/uL    Absolute Monocytes 0.3 0.0 - 1.3 10e3/uL    Absolute Eosinophils 0.1 0.0 - 0.7 10e3/uL    Absolute Basophils 0.1 0.0 - 0.2 10e3/uL    Absolute Immature Granulocytes 0.0 <=0.4 10e3/uL    Absolute NRBCs 0.0 10e3/uL   Extra Tube     Status: None    Narrative    The following orders were created for panel order Extra Tube.  Procedure                               Abnormality         Status                     ---------                               -----------         ------                     Extra Green Top (Lithiu...[[id number]]                      Final result                 Please view results for these tests on the individual orders.   Extra Green Top (Lithium Heparin) Tube     Status: None   Result Value Ref Range    Hold Specimen [facility]    CBC with platelets differential     Status: Abnormal    Narrative    The following orders were created for panel order CBC with platelets differential.  Procedure                               Abnormality         Status                     ---------                               -----------          ------                     CBC with platelets and ...[[id number]]  Abnormal            Final result                 Please view results for these tests on the individual orders.   Urine Drug Screen     Status: None (In process)    Narrative    The following orders were created for panel order Urine Drug Screen.  Procedure                               Abnormality         Status                     ---------                               -----------         ------                     Urine Drug Screen Panel[[id number]]                         In process                   Please view results for these tests on the individual orders.     Medications   diazepam (VALIUM) tablet 5-20 mg (has no administration in time range)     Labs Ordered and Resulted from Time of ED Arrival to Time of ED Departure   COMPREHENSIVE METABOLIC PANEL - Abnormal       Result Value    Sodium 139      Potassium 3.4      Carbon Dioxide (CO2) 24      Anion Gap 14      Urea Nitrogen 16.6      Creatinine 0.63 (*)     GFR Estimate >90      Calcium 8.7 (*)     Chloride 101      Glucose 93      Alkaline Phosphatase 85      AST 66 (*)     ALT 49      Protein Total 7.3      Albumin 4.3      Bilirubin Total 0.2     MAGNESIUM - Abnormal    Magnesium 1.5 (*)    ETHYL ALCOHOL LEVEL - Abnormal    Alcohol ethyl 0.41 (*)    CBC WITH PLATELETS AND DIFFERENTIAL - Abnormal    WBC Count 5.9      RBC Count 4.41      Hemoglobin 13.5      Hematocrit 38.7 (*)     MCV 88      MCH 30.6      MCHC 34.9      RDW 13.3      Platelet Count 255      % Neutrophils 47      % Lymphocytes 45      % Monocytes 4      % Eosinophils 2      % Basophils 1      % Immature Granulocytes 0      NRBCs per 100 WBC 0      Absolute Neutrophils 2.8      Absolute Lymphocytes 2.7      Absolute Monocytes 0.3      Absolute Eosinophils 0.1      Absolute Basophils 0.1      Absolute Immature Granulocytes 0.0      Absolute NRBCs 0.0     LIPASE - Normal    Lipase 37     URINE DRUG SCREEN PANEL      No orders to display          {Critical Care Performed?:632999}    Assessment & Plan    ***    I have reviewed the nursing notes. I have reviewed the findings, diagnosis, plan and need for follow up with the patient.    New Prescriptions    No medications on file       Final diagnoses:   None       Nicholas Figueroa***  [facility] EMERGENCY DEPARTMENT  4/23/2025   ALCOHOL LEVEL - Abnormal    Alcohol ethyl 0.41 (*)    CBC WITH PLATELETS AND DIFFERENTIAL - Abnormal    WBC Count 5.9      RBC Count 4.41      Hemoglobin 13.5      Hematocrit 38.7 (*)     MCV 88      MCH 30.6      MCHC 34.9      RDW 13.3      Platelet Count 255      % Neutrophils 47      % Lymphocytes 45      % Monocytes 4      % Eosinophils 2      % Basophils 1      % Immature Granulocytes 0      NRBCs per 100 WBC 0      Absolute Neutrophils 2.8      Absolute Lymphocytes 2.7      Absolute Monocytes 0.3      Absolute Eosinophils 0.1      Absolute Basophils 0.1      Absolute Immature Granulocytes 0.0      Absolute NRBCs 0.0     URINE DRUG SCREEN PANEL - Abnormal    Amphetamines Urine Screen Positive (*)     Barbituates Urine Screen Negative      Benzodiazepine Urine Screen Positive (*)     Cannabinoids Urine Screen Positive (*)     Cocaine Urine Screen Negative      Fentanyl Qual Urine Screen Negative      Opiates Urine Screen Negative      PCP Urine Screen Negative     LIPASE - Normal    Lipase 37       No orders to display          Critical care was not performed.     Medical Decision Making  The patient's presentation was of high complexity (a chronic illness severe exacerbation, progression, or side effect of treatment).    The patient's evaluation involved:  ordering and/or review of 3+ test(s) in this encounter (see separate area of note for details)    The patient's management necessitated moderate risk (limitations due to social determinants of health (substance use)) and high risk (a decision regarding hospitalization).    Assessment & Plan        I have reviewed the nursing notes. I have reviewed the findings, diagnosis, plan and need for follow up with the patient.    Patient with chronic alcohol dependence at this time at this time presenting with alcohol ketoacidosis will require medically supervised detox and treatment patient is at significant risk for seizure and DT and will require inpatient medical  detox at this time patient will be admitted to station 3A      Final diagnoses:   Unspecified mood (affective) disorder   Alcohol use disorder       Nicholas Figueroa  [facility] EMERGENCY DEPARTMENT  4/23/2025     Nicholas Figueroa MD  04/25/25 1003

## 2025-04-23 NOTE — TELEPHONE ENCOUNTER
S: [facility] Yodit , Provider Christianson calling at 5:54 PM with clinical on 49 year old/male presenting for alcohol detox.     B: Pt presents for ETOH detox.   Currently reports drinking 1L for months.    Patient reports last use was PTA.  Pt BAC: 0.41  Pt  denies hx of DT  Pt  endorses hx of seizures. Last seizure:  A month and a half ago  Pt endorsing the following symptoms of withdrawal: Tachycardic  MSSA Score: N/A - Still slightly intoxicated    Pt denies acute mental health or medical concerns.   Pt denies other drug use: None Amount/frequency: N/A    Does Pt have a detox care plan in [facility]? No  Does pt present with specific needs, assistive devices, or exclusionary criteria? None  Is the patient ambulating, eating and drinking in the ED? Yes    A: Pt meets criteria to be presented for IP detox admission. Patient is voluntary    COVID Symptoms: No  If yes, COVID test required   Utox: Positive for amphetamines, benzodiazepines and cannabinoids   Magnesium: Abnormalities: 1.5.  ED MD will start oral replacement  CMP: Abnormalities: Creatinine 0.63 / Calcium 8.7 / AST 66  CBC: Abnormalities: Hematocrit 38.7  HCG: N/A     R: Patient cleared and ready for behavioral bed placement: Yes    Pt is meeting criteria for presentation to 3A/CD    Does Patient need a Transfer Center request created? No, Pt is located within [facility] ED, [facility] ED, or [facility]    6:19 PM - Paged Seth for detox review    6:42 PM - Called ED to request mag replacement and to obtain a temp, per on call's request  7:35 PM - Received call from ED CRBENNY Green who expressed concerns re: replacing magnesium as it's above the detox exclusionary criteria of 1.2 and they do not want pt's levels to be too high, potentially causing him to go to medical unit. Writer to discuss with on call, Seth  7:37 PM - Paged Seth  8:39 PM - Paged Seth    Per message from NATHEN Prasad 9:10 PM:  per Seth @9:09 PM  the ED Pt does not need a new mag level and  can go up anytime    Placed pt in queue    R: MARTIN / Nolan / EDOUARD    9:37 PM - Notified unit JOVAN Peters  9:42 PM - Notified ED SERENITY meier

## 2025-04-24 PROBLEM — F10.939 ALCOHOL WITHDRAWAL WITH COMPLICATION WITH INPATIENT TREATMENT (H): Status: ACTIVE | Noted: 2025-04-24

## 2025-04-24 LAB
GGT SERPL-CCNC: 47 U/L (ref 8–61)
HOLD SPECIMEN: NORMAL
MAGNESIUM SERPL-MCNC: 1.3 MG/DL (ref 1.7–2.3)
T4 FREE SERPL-MCNC: 1.28 NG/DL (ref 0.9–1.7)
TSH SERPL DL<=0.005 MIU/L-ACNC: 0.26 UIU/ML (ref 0.3–4.2)

## 2025-04-24 PROCEDURE — 250N000011 HC RX IP 250 OP 636: Performed by: NURSE PRACTITIONER

## 2025-04-24 PROCEDURE — HZ2ZZZZ DETOXIFICATION SERVICES FOR SUBSTANCE ABUSE TREATMENT: ICD-10-PCS | Performed by: NURSE PRACTITIONER

## 2025-04-24 PROCEDURE — 250N000013 HC RX MED GY IP 250 OP 250 PS 637

## 2025-04-24 PROCEDURE — 99222 1ST HOSP IP/OBS MODERATE 55: CPT | Performed by: NURSE PRACTITIONER

## 2025-04-24 PROCEDURE — 250N000013 HC RX MED GY IP 250 OP 250 PS 637: Performed by: FAMILY MEDICINE

## 2025-04-24 PROCEDURE — 99255 IP/OBS CONSLTJ NEW/EST HI 80: CPT

## 2025-04-24 PROCEDURE — 128N000004 HC R&B CD ADULT

## 2025-04-24 PROCEDURE — 250N000013 HC RX MED GY IP 250 OP 250 PS 637: Performed by: NURSE PRACTITIONER

## 2025-04-24 PROCEDURE — 36415 COLL VENOUS BLD VENIPUNCTURE: CPT

## 2025-04-24 PROCEDURE — 83735 ASSAY OF MAGNESIUM: CPT

## 2025-04-24 RX ORDER — MAGNESIUM OXIDE 400 MG/1
400 TABLET ORAL 2 TIMES DAILY
Status: DISCONTINUED | OUTPATIENT
Start: 2025-04-24 | End: 2025-04-24

## 2025-04-24 RX ORDER — HYDROXYZINE HYDROCHLORIDE 25 MG/1
25 TABLET, FILM COATED ORAL EVERY 4 HOURS PRN
Status: DISCONTINUED | OUTPATIENT
Start: 2025-04-24 | End: 2025-04-28 | Stop reason: HOSPADM

## 2025-04-24 RX ORDER — MAGNESIUM OXIDE 400 MG/1
400 TABLET ORAL 2 TIMES DAILY
Status: COMPLETED | OUTPATIENT
Start: 2025-04-24 | End: 2025-04-27

## 2025-04-24 RX ORDER — QUETIAPINE FUMARATE 50 MG/1
50-100 TABLET, FILM COATED ORAL 4 TIMES DAILY PRN
Status: DISCONTINUED | OUTPATIENT
Start: 2025-04-24 | End: 2025-04-24

## 2025-04-24 RX ORDER — AMOXICILLIN 250 MG
1 CAPSULE ORAL 2 TIMES DAILY PRN
Status: DISCONTINUED | OUTPATIENT
Start: 2025-04-24 | End: 2025-04-28 | Stop reason: HOSPADM

## 2025-04-24 RX ORDER — ONDANSETRON 4 MG/1
4 TABLET, ORALLY DISINTEGRATING ORAL EVERY 6 HOURS PRN
Status: DISCONTINUED | OUTPATIENT
Start: 2025-04-24 | End: 2025-04-28 | Stop reason: HOSPADM

## 2025-04-24 RX ORDER — FOLIC ACID 1 MG/1
1 TABLET ORAL DAILY
Status: DISCONTINUED | OUTPATIENT
Start: 2025-04-24 | End: 2025-04-28 | Stop reason: HOSPADM

## 2025-04-24 RX ORDER — MULTIPLE VITAMINS W/ MINERALS TAB 9MG-400MCG
1 TAB ORAL DAILY
Status: DISCONTINUED | OUTPATIENT
Start: 2025-04-24 | End: 2025-04-28 | Stop reason: HOSPADM

## 2025-04-24 RX ORDER — GABAPENTIN 300 MG/1
300 CAPSULE ORAL 3 TIMES DAILY
Status: DISCONTINUED | OUTPATIENT
Start: 2025-04-24 | End: 2025-04-28 | Stop reason: HOSPADM

## 2025-04-24 RX ORDER — QUETIAPINE FUMARATE 50 MG/1
50-100 TABLET, FILM COATED ORAL 4 TIMES DAILY PRN
Status: DISCONTINUED | OUTPATIENT
Start: 2025-04-24 | End: 2025-04-28 | Stop reason: HOSPADM

## 2025-04-24 RX ORDER — CALCIUM CARBONATE 500 MG/1
1000 TABLET, CHEWABLE ORAL ONCE
Status: COMPLETED | OUTPATIENT
Start: 2025-04-24 | End: 2025-04-24

## 2025-04-24 RX ORDER — ACETAMINOPHEN 325 MG/1
650 TABLET ORAL EVERY 4 HOURS PRN
Status: DISCONTINUED | OUTPATIENT
Start: 2025-04-24 | End: 2025-04-28 | Stop reason: HOSPADM

## 2025-04-24 RX ORDER — MAGNESIUM HYDROXIDE/ALUMINUM HYDROXICE/SIMETHICONE 120; 1200; 1200 MG/30ML; MG/30ML; MG/30ML
30 SUSPENSION ORAL EVERY 4 HOURS PRN
Status: DISCONTINUED | OUTPATIENT
Start: 2025-04-24 | End: 2025-04-28 | Stop reason: HOSPADM

## 2025-04-24 RX ORDER — ATENOLOL 50 MG/1
50 TABLET ORAL DAILY PRN
Status: DISCONTINUED | OUTPATIENT
Start: 2025-04-24 | End: 2025-04-28 | Stop reason: HOSPADM

## 2025-04-24 RX ORDER — MAGNESIUM OXIDE 400 MG/1
800 TABLET ORAL ONCE
Status: COMPLETED | OUTPATIENT
Start: 2025-04-24 | End: 2025-04-24

## 2025-04-24 RX ORDER — TRAZODONE HYDROCHLORIDE 50 MG/1
50 TABLET ORAL
Status: DISCONTINUED | OUTPATIENT
Start: 2025-04-24 | End: 2025-04-24

## 2025-04-24 RX ORDER — LOPERAMIDE HYDROCHLORIDE 2 MG/1
2 CAPSULE ORAL 4 TIMES DAILY PRN
Status: DISCONTINUED | OUTPATIENT
Start: 2025-04-24 | End: 2025-04-28 | Stop reason: HOSPADM

## 2025-04-24 RX ADMIN — CALCIUM CARBONATE (ANTACID) CHEW TAB 500 MG 1000 MG: 500 CHEW TAB at 10:20

## 2025-04-24 RX ADMIN — MAGNESIUM OXIDE TAB 400 MG (241.3 MG ELEMENTAL MG) 400 MG: 400 (241.3 MG) TAB at 20:53

## 2025-04-24 RX ADMIN — DIAZEPAM 10 MG: 5 TABLET ORAL at 05:07

## 2025-04-24 RX ADMIN — Medication 1 TABLET: at 08:22

## 2025-04-24 RX ADMIN — ONDANSETRON 4 MG: 4 TABLET, ORALLY DISINTEGRATING ORAL at 17:07

## 2025-04-24 RX ADMIN — FOLIC ACID 1 MG: 1 TABLET ORAL at 08:22

## 2025-04-24 RX ADMIN — THIAMINE HCL TAB 100 MG 100 MG: 100 TAB at 08:22

## 2025-04-24 RX ADMIN — GABAPENTIN 300 MG: 300 CAPSULE ORAL at 20:53

## 2025-04-24 RX ADMIN — DIAZEPAM 10 MG: 5 TABLET ORAL at 20:53

## 2025-04-24 RX ADMIN — GABAPENTIN 300 MG: 300 CAPSULE ORAL at 10:20

## 2025-04-24 RX ADMIN — DIAZEPAM 10 MG: 5 TABLET ORAL at 08:22

## 2025-04-24 RX ADMIN — MAGNESIUM OXIDE TAB 400 MG (241.3 MG ELEMENTAL MG) 800 MG: 400 (241.3 MG) TAB at 10:20

## 2025-04-24 RX ADMIN — ALUMINUM HYDROXIDE, MAGNESIUM HYDROXIDE, AND SIMETHICONE 30 ML: 200; 200; 20 SUSPENSION ORAL at 17:07

## 2025-04-24 RX ADMIN — DIAZEPAM 10 MG: 5 TABLET ORAL at 00:56

## 2025-04-24 RX ADMIN — DIAZEPAM 10 MG: 5 TABLET ORAL at 17:07

## 2025-04-24 ASSESSMENT — ACTIVITIES OF DAILY LIVING (ADL)
DRESS: INDEPENDENT
ADLS_ACUITY_SCORE: 49
HYGIENE/GROOMING: INDEPENDENT
ADLS_ACUITY_SCORE: 23
ORAL_HYGIENE: INDEPENDENT
ADLS_ACUITY_SCORE: 23
LAUNDRY: WITH SUPERVISION
ADLS_ACUITY_SCORE: 23
ADLS_ACUITY_SCORE: 49
ADLS_ACUITY_SCORE: 23
DRESS: INDEPENDENT
HYGIENE/GROOMING: INDEPENDENT
ADLS_ACUITY_SCORE: 23
ORAL_HYGIENE: INDEPENDENT
ADLS_ACUITY_SCORE: 23

## 2025-04-24 ASSESSMENT — LIFESTYLE VARIABLES: SKIP TO QUESTIONS 9-10: 0

## 2025-04-24 NOTE — PROGRESS NOTES
04/24/25 0124   Patient Belongings   Did you bring any home meds/supplements to the hospital?  No   Patient Belongings other (see comments)   Belongings Search Yes   Clothing Search Yes   Second Staff Oren     Storage Bin: jeans, belt, black cardigan sweater, socks, t-shirt, jacket, brochure, lotion, shoes, underwear  Medi Rm Box: vape device,   Security Envelope [id number]: [place] EBT    Notes: bottle of mouthwash containing alcohol was dumped; a glass globe pipe was disposed of; no phone, no wallet  A             ADMISSION:    I am responsible for any personal items that are not sent to the safe or pharmacy. [facility] is not responsible for loss, theft or damage of any property in my possession.    Patient Signature _________________________________________ Date/Time _____________________    Staff Signature ___________________________________________ Date/Time _____________________    [facility] Staff person, if patient is unable/unwilling to sign    ________________________________________________________ Date/Time _____________________    DISCHARGE:    All personal items have been returned to me.    Patient Signature _________________________________________ Date/Time _____________________    Staff Signature ___________________________________________ Date/Time _____________________

## 2025-04-24 NOTE — H&P
History and Physical    Kai Sims MRN# [id number]   Age: 49 year old YOB: 1975     Date of Admission:  4/23/2025          Contacts:     PCP - MARTHA Bear, CNP - [facility]    [facility]  - Kb Cabrera ([phone])         Diagnoses:     Alcohol use disorder, severe, dependence  Alcohol withdrawal  Stimulant (methamphetamine) use disorder  Cannabis use  History of opiate use disorder  Nicotine use disorder  History of bipolar disorder type 1 versus schizoaffective disorder, bipolar type  History of antisocial personality disorder         Recommendations:     Admit to Unit: [facility]    Attending Physician: Dr. Lopes    Patient is voluntary.    Routine lab studies have been requested.    Monitor for target symptoms.     Provide a safe environment and therapeutic milieu.     Internal medicine consult.    [facility] [place]    Medications:  He is not an accurate historian with regard to medications but indicated taking Neurontin and Seroquel.  Continue Neurontin 300 mg TID.  Continue Seroquel  mg QID PRN.  Pt states he would like to discuss psych meds further when he is feeling better.      He is homeless.  He is awaiting placement at [facility].  Per outpatient records, he was referred to a [facility] ACT team earlier this month, and he believes he now has ACT services.  He is not interested in CD treatment.        Attestation:  Patient has been seen and evaluated by me, Sarah Sarabia, APRN CNP  The patient was counseled on nature of illness and treatment plan/options  Care was coordinated with treatment team         Chief Complaint:     History is obtained from the patient and electronic health record.  ED notes, outpatient records and records from previous hospitalizations were reviewed.    Alcohol withdrawal         History of Present Illness:          Kai Sims is a 49-year-old male admitted to [facility] on 4/23/2025.  He was admitted as a voluntary patient through the ED due to alcohol use disorder and withdrawal.  During the search in the ED, a large bottle of mouthwash and a needle were found.  He was consuming nearly a liter of liquor daily.  He reports using IV meth \"not much at all.\"  He reports a binge pattern of use, typically 3 days on and one month off.  Last use was 3 days prior to admission.  He smokes cannabis daily.  BAL was 0.41.  UTOX was positive for amphetamines, benzodiazepines and cannabinoids.  He is homeless and has been trying to move into [facility], waiting for an income statement from the [facility].  He said he does not want to be sober.  \"I was drunk and didn't want to stay outside.  I want to get into the [facility].\"  He reports sporadic adherence to medications and struggled to identify prior to admission medications.  He reports he is no longer using the Butrans patch.           Psychiatric Review of Systems:     His mood is \"up and down.\"  He sometimes has anhedonia.  Energy is variable, \"like a asher asher train, a roller coaster.\"  He sometimes feels as though he has too much energy.  Concentration is \"not that good.\"  Sleep is \"not that good.\"  Appetite is \"not that good.\"  He denies suicidal and homicidal ideation.  He denies hallucinations.  He denies paranoid/delusional thoughts.  He reports feeling anxious and irritable.  He has racing thoughts.  He feels restless.  He reports struggles with impulsivity.  He denies excessive gambling.           Medical Review of Systems:     He reports nausea and tremor.  A 10-point review of systems was completed and is otherwise negative with the exception of HPI.         Psychiatric History:     He has a history of bipolar disorder type 1, schizoaffective disorder bipolar type and antisocial personality disorder.  He has a history of psychiatric hospitalizations, but none in recent years.  Per records, he has a history " "of several suicide attempts by overdose on heroin. SLUMS score in 9/2022 was 20/30.  He has a remote history of aggressive behaviors.  In the past he has taken Neurontin, Remeron, Depakote, Abilify, Zyprexa, Seroquel and Vraylar.            Substance Use History:     He began abusing substances at age 14.  He was consuming nearly 1 liter of liquor daily.  He reports progressive use, loss of control, continued use in spite of consequences, tolerance, withdrawal, cravings and consuming more and longer than intended.  He has a history of withdrawal seizures.  Records indicate a history of DTs.  In 9/2022 he was medically hospitalized with Wernicke Korsakoff syndrome.  He has a history of consuming mouthwash.  He reports using IV meth \"not much at all.\"  He reports a binge pattern of use, typically 3 days on and one month off.  He has a history of opiate use disorder and formerly used Perocet and heroin.  He has had a Butrans patch prescribed for the past few years but states he is no longer using it.  He has used cocaine in the past.  He has abused benzodiazepines in the past.  He smokes cannabis daily.  He smokes \"a lot\" of cigarettes.  He was most recently on 3A detox in 9/2023.  He has a history of over 26 CD treatments.  He does not attend AA or NA.            Past Medical History:     Hepatitis C, received Mavyret but reportedly did not complete course  Psoriasis  Frostbite, bilateral hands  Compression fracture of L1 vertebrae  Alcohol withdrawal seizures  TBI 2008         Past Surgical History:     Finger amputations  \"Lots of stuff, I don't want to go into it.\"           Allergies:     No known allergies           Medications:     He was unable to provide information regarding PTA medications.  Reviewed dispense history, and he stated that he is supposed to be taking Neurontin and Seroquel.           Social History:     He grew up in [place].  He was raised by his father when he was young.  He was " "involved in fights as a child.  \"I started acting out.\"  He was in foster care. He has a 9th grade education and earned a GED in intermediate.  He is not employed.  He is homeless and attempting to obtain a bed at [facility].  He has stayed at wet houses in the past.  He has 1 son with whom he does not have contact.  He is single.  He has been to intermediate 3 times for a total of 9 years due to selling drugs.           Family History:     His father and sister have a history of substance abuse.  His father \"drank himself to death.\"           Labs:      Latest Reference Range & Units 04/23/25 15:57 04/23/25 16:27 04/23/25 16:28 04/23/25 17:41   Sodium 135 - 145 mmol/L 139      Potassium 3.4 - 5.3 mmol/L 3.4      Chloride 98 - 107 mmol/L 101      Carbon Dioxide (CO2) 22 - 29 mmol/L 24      Urea Nitrogen 6.0 - 20.0 mg/dL 16.6      Creatinine 0.67 - 1.17 mg/dL 0.63 (L)      GFR Estimate >60 mL/min/1.73m2 >90      Calcium 8.8 - 10.4 mg/dL 8.7 (L)      Anion Gap 7 - 15 mmol/L 14      Magnesium 1.7 - 2.3 mg/dL 1.5 (L)      Albumin 3.5 - 5.2 g/dL 4.3      Protein Total 6.4 - 8.3 g/dL 7.3      Alkaline Phosphatase 40 - 150 U/L 85      ALT 0 - 70 U/L 49      AST 0 - 45 U/L 66 (H)      Bilirubin Total <=1.2 mg/dL 0.2      GGT 8 - 61 U/L  47     Glucose 70 - 99 mg/dL 93      Lipase 13 - 60 U/L 37      T4 Free 0.90 - 1.70 ng/dL  1.28     TSH 0.30 - 4.20 uIU/mL  0.26 (L)     WBC 4.0 - 11.0 10e3/uL   5.9    Hemoglobin 13.3 - 17.7 g/dL   13.5    Hematocrit 40.0 - 53.0 %   38.7 (L)    Platelet Count 150 - 450 10e3/uL   255    RBC Count 4.40 - 5.90 10e6/uL   4.41    MCV 78 - 100 fL   88    MCH 26.5 - 33.0 pg   30.6    MCHC 31.5 - 36.5 g/dL   34.9    RDW 10.0 - 15.0 %   13.3    % Neutrophils %   47    % Lymphocytes %   45    % Monocytes %   4    % Eosinophils %   2    % Basophils %   1    % Immature Granulocytes %   0    NRBC/W <1 /100   0    Absolute Neutrophil 1.6 - 8.3 10e3/uL   2.8    Absolute Lymphocytes 0.8 - 5.3 10e3/uL   2.7  " "  Absolute Monocytes 0.0 - 1.3 10e3/uL   0.3    Absolute Eosinophils 0.0 - 0.7 10e3/uL   0.1    Absolute Basophils 0.0 - 0.2 10e3/uL   0.1    Absolute Immature Granulocytes <=0.4 10e3/uL   0.0    Absolute NRBCs 10e3/uL   0.0    Amphetamine Qual Urine Screen Negative     Screen Positive !   Fentanyl Qual Urine Screen Negative     Screen Negative   Cocaine Urine Screen Negative     Screen Negative   Benzodiazepine Urine Screen Negative     Screen Positive !   Opiates Qualitative Urine Screen Negative     Screen Negative   PCP Urine Screen Negative     Screen Negative   Cannabinoids Qual Urine Screen Negative     Screen Positive !   Barbiturates Qual Urine Screen Negative     Screen Negative   Alcohol ethyl <=0.01 g/dL 0.41 (HH)             Psychiatric Examination:     Appearance:  awake, alert, disheveled  Attitude:  pleasant, attempts to cooperate  Eye Contact:  fair  Mood:  \"up and down\"  Affect:  intensity is mildly increased  Speech:  normal rate, rhythm and volume, rather garbled at times  Psychomotor Behavior:  no evidence of tardive dyskinesia, dystonia, or tics, psychomotor agitation is present  Thought Process:  rather concrete, less than logical  Associations:  no loose associations  Thought Content:  no evidence of suicidal ideation or homicidal ideation, denies psychotic symptoms  Insight:  limited  Judgment:  fair  Oriented to:  month/year, time, person, and place  Attention Span and Concentration:  limited  Recent and Remote Memory:  limited  Language:  intact, fluent English  Fund of Knowledge:  low-normal  Muscle Strength and Tone:  normal  Gait and Station:  fidgety    BP (!) 141/92 (Cuff Size: Adult Regular)   Pulse 105   Temp 97  F (36.1  C) (Temporal)   Resp 18   Ht 1.702 m (5' 7\")   Wt 63.5 kg (140 lb)   SpO2 95%   BMI 21.93 kg/m           Physical Exam:     Please refer to the physical exam completed by Dr. Figueroa in the ED on 4/23/2025:    Constitutional:       General: He is not in " acute distress.     Appearance: Normal appearance. He is not toxic-appearing.   HENT:      Head: Atraumatic.   Eyes:      General: No scleral icterus.     Conjunctiva/sclera: Conjunctivae normal.   Cardiovascular:      Rate and Rhythm: Normal rate.      Heart sounds: Normal heart sounds.   Pulmonary:      Effort: Pulmonary effort is normal. No respiratory distress.      Breath sounds: Normal breath sounds.   Abdominal:      Palpations: Abdomen is soft.      Tenderness: There is no abdominal tenderness.   Musculoskeletal:         General: No deformity.      Cervical back: Neck supple.   Skin:     General: Skin is warm.   Neurological:      General: No focal deficit present.      Mental Status: He is alert and oriented to person, place, and time.      Sensory: No sensory deficit.      Motor: No weakness.      Coordination: Coordination normal.

## 2025-04-24 NOTE — PLAN OF CARE
Behavioral Team Discussion: (4/24/2025)    Continued Stay Criteria/Rationale: Patient admitted for Chemical Use Issues.  Plan: The following services will be provided to the patient; psychiatric assessment, medication management, therapeutic milieu, individual and group support, and skills groups.   Participants: 3A Provider: Dallas Lopes MD; 3A RN: Elysia Gray RN; 3A LADC: NAYELI Villela.  Summary/Recommendation: Providers will assess today for treatment recommendations, discharge planning, and aftercare plans. LADC will meet with pt for discharge planning.   Medical/Physical: The patient has a history of hepatitis C and seizures, with the most recent seizure occurring one month ago. No additional medical concerns have been reported.  Precautions:   Behavioral Orders   Procedures    Code 1 - Restrict to Unit    Routine Programming     As clinically indicated    Seizure precautions    Status 15     Every 15 minutes.    Withdrawal precautions     Rationale for change in precautions or plan: N/A  Progress: Initial.    ASAM Dimension Scale Ratings:  Dimension 1: 3 Client tolerates and koko with withdrawal discomfort poorly. Client has severe intoxication, such that the client endangers self or others, or intoxication has not abated with less intensive levels of services. Client displays severe signs and symptoms; or risk of severe, but manageable withdrawal; or withdrawal worsening despite detox at less intensive level.  Dimension 2: 1 Client tolerates and koko with physical discomfort and is able to get the services that the client needs.  Dimension 3: 2 Client has difficulty with impulse control and lacks coping skills. Client has thoughts of suicide or harm to others without means; however, the thoughts may interfere with participation in some treatment activities. Client has difficulty functioning in significant life areas. Client has moderate symptoms of emotional, behavioral, or cognitive problems.  Client is able to participate in most treatment activities.  Dimension 4: 2 Client displays verbal compliance, but lacks consistent behaviors; has low motivation for change; and is passively involved in treatment.  Dimension 5: 4 No awareness of the negative impact of mental health problems or substance abuse. No coping skills to arrest mental health or addiction illnesses, or prevent relapse.  Dimension 6: 3 Client is not engaged in structured, meaningful activity and the client's peers, family, significant other, and living environment are unsupportive, or there is significant criminal justice system involvement.

## 2025-04-24 NOTE — PLAN OF CARE
"Pt has spent most of the shift in bed.   He was up to the lounge briefly for meals and to receive meds this morning.     Continues to be monitored for alcohol withdrawal.   He is difficult to assess as some of his objective symptoms may be chronic/baseline or related to methamphetamine use/withdrawal. Notable psychomotor agitation present.   Pt is not a reliable historian. He says he is unsure if the restlessness is withdrawal related or not.   He appears to be eating and hydrating well.   He told Attending provider today that he has not intention to stop using alcohol.   His mood is \"ok\". He endorses anxiety. Otherwise denies mental health sx or concerns.   He is cooperative with care, medication compliant except declined 1400 dose of Gabapentin as he just wanted to sleep.   Appearance is unkempt.     MSSA- 14, 7. PRN Valium 10mg given x1 this shift. During lunch check, pt was dismissive of assessment and stated he just wanted to sleep.   Mild hypertension, tachycardia. BP (!) 143/82   Pulse 99   Temp 97.3  F (36.3  C) (Oral)   Resp 16   Ht 1.702 m (5' 7\")   Wt 63.5 kg (140 lb)   SpO2 97%   BMI 21.93 kg/m      Hypomag- 1.5 and hypocalcemia. PO replacement ordered.   *addendum 1450-mag now resulted 1.3.       Problem: Adult Behavioral Health Plan of Care  Goal: Plan of Care Review  Outcome: Progressing     Problem: Alcohol Withdrawal  Goal: Alcohol Withdrawal Symptom Control  Outcome: Progressing   Goal Outcome Evaluation:                        "

## 2025-04-24 NOTE — CONSULTS
"[facility]  Consult Note - Hospitalist Service  Date of Admission:  4/23/2025  Consult Requested by: Dr. Lopes   Reason for Consult: detox     Assessment & Plan   Kai Sims is a 49 year old male admitted on 4/23/2025. He has a past medical history of hep C, polysubstance use disorder, bipolar I. He presented to the ED in alcohol withdrawal. He was admitted to  for detox. Psychiatry consulted Medicine to assist with medical co-management of patient medical comorbidities.   _______________________      # Acute alcohol withdrawal  # Alcohol use disorder  # Meth use disorder   # Elevated AST   Presented to ED in withdrawal seeking detox. Patient has been drinking nearly a liter of alcohol and some \"a lot of yellow Listerine\" daily he states that he wants to be sober. He does note a history of withdrawal seizures - his last seizure being just over a month ago. Patient denies any significant DT history. He has a history of methamphetamine use and inject still; he did test positive for benzos, cannabinoids, and amphetamines on UDS. MSSA this shift was 7. Blood etoh on arrival was 0.41. AST 66 in setting of alcohol use disorder.   - Continue MSSA   - Folvite, multi-vites, thiamine supplementation   - Further management per Psychiatry   - Seizures precautions    - Gabapentin TID per primary     # Mild hypomagnesemia   # Mild hypocalcemia   Mag 1.5->1.3 and calcium 8.7. In the setting of alcohol use disorder and poor PO intake.   - Mag ox 400 mg x BID, calcium carb 1 gm x 1   - AM mag      # Bipolar disorder  - Management per psychiatry team, PRN seroquel      # Chronic Hepatitis C s/p treatment   Appears to have successfully completed treatment with neg PCR x 2 with last in July 2024. Ongoing IV drug use.  Follows with IM at [facility] - seen 4/2025.   - Most recent hep C PCR not detected      # History of TBI: Noted   # Tobacco use disorder: NRT per primary    # " "Housing insecurity: Recommend SW consult        The patient's care was discussed with the Patient.    Clinically Significant Risk Factors Present on Admission             # Hypomagnesemia: Lowest Mg = 1.5 mg/dL in last 2 days, will replace as needed                     # Housing Instability: noted in nursing assessment         Brant Osorio PA-C  Hospitalist Service  Securely message with [employer] (more info)  Text page via [facility] Paging/Directory   ______________________________________________________________________    Chief Complaint   Detox     History is obtained from the patient    History of Present Illness   Presented to ED in withdrawal seeking detox. Patient has been drinking nearly a liter of alcohol and some \"a lot of yellow Listerine\" daily he states that he wants to be sober. He does note a history of withdrawal seizures - his last seizure being just over a month ago. Patient denies any significant DT history. He has a history of methamphetamine use and continued use now. He did test positive for benzos, cannabinoids, and amphetamines on UDS. Denies nausea. Notes some tremors.       Past Medical History    Past Medical History:   Diagnosis Date    Bipolar affective disorder (H)     Substance abuse (H)        Past Surgical History   Past Surgical History:   Procedure Laterality Date    ENT SURGERY      ORTHOPEDIC SURGERY         Medications   No medications prior to admission.             Physical Exam   Vital Signs: Temp: 97.3  F (36.3  C) Temp src: Oral BP: (!) 143/82 Pulse: 99   Resp: 16 SpO2: 97 % O2 Device: None (Room air)    Weight: 140 lbs 0 oz    General: Alert and oriented x3. Somewhat tremulous, unwell kempt, disheveled   HEENT: Anicteric sclera, MMM  Cardiovascular: RRR, S1S2. No murmur noted  Lungs: CTAB without wheezing or crackles   GI: Abdomen soft, non-tender without rebound or guarding. + Bowel sounds.  Vascular: No peripheral edema, distal pulses palpable  Neurologic: No focal deficits, CN " II-XII grossly intact  Skin: No jaundice, rashes, or lesions       Medical Decision Making       60 MINUTES SPENT BY ME on the date of service doing chart review, history, exam, documentation & further activities per the note.      Data   Recent Labs   Lab 04/23/25  1628 04/23/25  1557   WBC 5.9  --    HGB 13.5  --    MCV 88  --      --    NA  --  139   POTASSIUM  --  3.4   CHLORIDE  --  101   CO2  --  24   BUN  --  16.6   CR  --  0.63*   ANIONGAP  --  14   TOBY  --  8.7*   GLC  --  93   ALBUMIN  --  4.3   PROTTOTAL  --  7.3   BILITOTAL  --  0.2   ALKPHOS  --  85   ALT  --  49   AST  --  66*   LIPASE  --  37

## 2025-04-24 NOTE — DISCHARGE INSTRUCTIONS
Behavioral Discharge Planning and Instructions  THANK YOU FOR CHOOSING [facility]  3A  [phone]    Summary: You were admitted to Station 3A on *** for detoxification from ***.  A medical exam was performed that included lab work. You have met with a [facility] Counselor and opted to ***.  Please take care and make your recovery a daily priority, Kai!  It was a pleasure working with you and the entire treatment team here wishes you the very best in your recovery!     Recommendation:  ***    Main Diagnoses:    {Substance Related Use Disorders:870567}    Major Treatments, Procedures and Findings:  You were treated for *** detoxification using ***. As an outpatient you will be prescribed ***, please take this medication as prescribed until it is gone. You have met with a [facility] counselor to develop a treatment plan for discharge. You had labs drawn and those results were reviewed with you. Please take a copy of your lab work with you to your next primary care provider appointment.    Symptoms to Report:  If you experience more anxiety, confusion, sleeplessness, deep sadness or thoughts of suicide, notify your treatment team or notify your primary care provider. IF ANY OF THE SYMPTOMS YOU ARE EXPERIENCING ARE A MEDICAL EMERGENCY CALL 911 IMMEDIATELY.     Lifestyle Adjustment: Adjust your lifestyle to get enough sleep, relaxation, exercise and good nutrition. Continue to develop healthy coping skills to decrease stress and promote a sober living environment. Do not use mood altering substances including alcohol, illegal drugs or addictive medications other than what is currently prescribed.     Disposition: ***    Facts about COVID19 at www.cdc.gov/COVID19 and www.[place].gov/covid19    Keeping hands clean is one of the most important steps we can take to avoid getting sick and spreading germs to others.  Please wash your hands frequently and lather with soap for at least 20 seconds!    Follow-up Appointment:    Appointment Date/Time: ***    Psychiatrist/Primary Care Giver: ***    Address: ***    Phone Number: ***      Recovery apps for your phone for educational purposes and to locate in person and zoom recovery meetings  Everything AA -  sayda is a great resource  12 Step Toolkit - educational purpose learning about the 12 steps to recovery  [place] Cloud - meeting sayda  AA  - meeting sayda  Meeting guide - meeting sayda  Quick NA meeting - meeting sayda  Sahra- has various apps    Patient Navigation Hub  [facility] work to be your point-of-contact for trustworthy and compassionate care from Inpatient services to [facility] Programmatic Care. We will provide resources and communication to help guide you into programmatic care. Ultimately, our goal is to be the one-stop-shop of communication, coordination, and support for your journey to programmatic care.  Phone: [phone]    Resources:  AA/NA meetings have returned to in-person or a hybrid combination of zoom/in-person therefore please check online to verify*  Need Support Now? If you or someone you know is struggling or in crisis, help is available. Call or text [employer]8 or chat [employer]  AA meetings search for them at: https://aa-intergroup.org (worldwide meeting listings)  AA meetings for [place] area can be found online at: https://[place].org (click local online meetings listings)  NA meetings for [place] area can be found online at: https://www.nam[place].org  (click find a meeting)  AA and NA Sponsors are excellent resources for support and you can find one at any support group meeting.   Alcoholics Anonymous (https://aa.org/): for information 24 hours/day  AA Intergroup service office in [place] (http://www.aastpaul.org/) 316.799.5311  AA Intergroup service office in [facility]: [phone]. (http://www.a[place].org/)  Narcotics Anonymous (www.nam[place].org) (103)  "845-6343  https://aafairviewriverside.org/meetings  SMART Recovery - self management for addiction recovery:  www.smartrecovery.org  Pathways ~ A Health Crisis Resource & Support Center:  552.963.3065.  https://prescribetoprevent.org/patient-education/videos/  http://www.harmreduction.org  Crisis Text Line  Text 460329  You will be connected with a trained live crisis counselor to provide support. Por espanol, texto  LUIZA a 436059 o texto a 442-AYUDAME en WhatsApp  [place] Hope Line  1.800.SUICIDE [3942089]  [facility] [phone]  Support Group:  AA/NA and Sponsor/support.  Fast Tracker  Linking people to mental health and substance use disorder resources  StampedckBharat Matrimonyn.org   [place] Mental Health Warm Line  Peer to peer support  Monday thru Saturday, 12 pm to 10 pm  711.880.7255 or 9.749.881.3458  Text \"Support\" to 55327  National [place] on Mental Illness (JAZZ)  387.058.3164 or 1.888.JAZZ.HELPS  Alcoholics Anonymous (www.alcoholics-anonymous.org): Check your phone book for your local chapter.  Suicide Awareness Voices of Education (SAVE) (www.save.org): 307-472-OPLQ (0983)  National Suicide Prevention Line (www.mentalhealthmn.org): 025-538-RNYK (8435)  Mental Health Consumer/Survivor Network of [place] (www.mhcsn.net): 379.875.6284 or 734-851-4534  Mental Health Association of [place] (www.mentalhealth.org): 265.491.1221 or 257-459-8176   Substance Abuse and Mental Health Services (www.samhsa.gov)  [place] Opioid Prevention Coalition: www.opioidcoalition.org    [place] Recovery Connection (MRC)  [facility] connects people seeking recovery to resources that help foster and sustain long-term recovery.  Whether you are seeking resources for treatment, transportation, housing, job training, education, health care or other pathways to recovery, [facility] is a great place to start.  569.978.4395.  www.WisdomTree.org    Great Pod casts for nutrition and wellness  Listen on Apple Podcasts  Dishing Up " Nutrition   [employer]   Nutrition       Understand the connection between what you eat and how you feel. Hosted by licensed nutritionists and dietitians from [employer] we share practical, real-life solutions for healthier living through nutrition.     General Medication Instructions:   See your medication sheet(s) for instructions.   Take all medications as prescribed.  Make no changes unless your primary care provider suggests them.   Go to all your primary care provider visits.  Be sure to have all your required lab tests. This way, your medicines can be refilled on time.  Do not use any forms of alcohol.    Please Note: If you have any questions at anytime after you discharged please call [facility] detox unit 3A at [phone].    Here are a list of additional numbers you can call if you are wanting to resume services through [facility]:  [facility] Assessment Intake: [phone]  [facility] Adult AMBER Intensive Outpatient  call: [phone]  [facility] Admissions [phone]    Recovery Clinic call: [phone]  [facility]: [phone]  Medical Records call: [phone]  Billing Department call: [phone]    Please remember to take all of your behavioral discharge planning and lab paperwork to any follow up appointments, it contains your lab results, diagnosis, medication list and discharge recommendations.      THANK YOU FOR CHOOSING [facility]    paperwork to any follow up appointments, it contains your lab results, diagnosis, medication list and discharge recommendations.      THANK YOU FOR CHOOSING  [facility]

## 2025-04-24 NOTE — PROGRESS NOTES
[address]     Daily Encounter: Met with team, discussed patient progress, discharge plan and any impediments to discharge.    Writer met with the patient to discuss discharge and aftercare planning. The patient reports drinking 1 liter of vodka daily for several years, last drink being prior to admission. He states that he has not experienced any significant periods of sobriety. The patient is unable to identify specific triggers or reasons behind his drinking pattern. He is uncertain whether he wants to detox only or pursue treatment. The patient has requested time to consider his options.    Insurance: [facility]     Legal Status:  Voluntary    SUDs Assessment Status:  Will need for placement.    ROIs on file: None at this time.     Living Situation: Unknown at this time.     Current Providers, Supports & Collateral: None.    Current Plan/Referral Status: Patient states that he is unsure of whether to attend CD treatment or not.    Safety Plan Status: discussed with pt, gave paperwork, will continue safety plan in a future session      NAYELI Barroso  [address] - Adult Inpatient Addiction Psychiatry Unit

## 2025-04-24 NOTE — PHARMACY-ADMISSION MEDICATION HISTORY
Pharmacist Admission Medication History    Admission medication history is complete. The information provided in this note is only as accurate as the sources available at the time of the update.    Information Source(s): Patient via in-person    Pertinent Information: Kai reported he doesn't take any medications at home. He reported not taking medications he is given after he discharges.    Changes made to PTA medication list:  Added: None  Deleted: Butrans, gabapentin, ibuprofen, multivitamin, nicotine lozenge, omeprazole, Seroquel, senna-docusate, thiamine  Changed: None    Allergies reviewed with patient and updates made in EHR: yes    Medication History Completed By: Phoebe Kay [facility] 4/24/2025 12:31 PM    No outpatient medications have been marked as taking for the 4/23/25 encounter (Hospital Encounter).
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
## 2025-04-25 VITALS
HEIGHT: 67 IN | WEIGHT: 140 LBS | OXYGEN SATURATION: 97 % | DIASTOLIC BLOOD PRESSURE: 81 MMHG | HEART RATE: 86 BPM | SYSTOLIC BLOOD PRESSURE: 132 MMHG | BODY MASS INDEX: 21.97 KG/M2 | RESPIRATION RATE: 18 BRPM | TEMPERATURE: 97 F

## 2025-04-25 LAB — MAGNESIUM SERPL-MCNC: 1.7 MG/DL (ref 1.7–2.3)

## 2025-04-25 PROCEDURE — 99232 SBSQ HOSP IP/OBS MODERATE 35: CPT | Performed by: NURSE PRACTITIONER

## 2025-04-25 PROCEDURE — 250N000013 HC RX MED GY IP 250 OP 250 PS 637: Performed by: NURSE PRACTITIONER

## 2025-04-25 PROCEDURE — 36415 COLL VENOUS BLD VENIPUNCTURE: CPT

## 2025-04-25 PROCEDURE — 250N000013 HC RX MED GY IP 250 OP 250 PS 637

## 2025-04-25 PROCEDURE — 128N000004 HC R&B CD ADULT

## 2025-04-25 PROCEDURE — 250N000013 HC RX MED GY IP 250 OP 250 PS 637: Performed by: FAMILY MEDICINE

## 2025-04-25 PROCEDURE — 83735 ASSAY OF MAGNESIUM: CPT

## 2025-04-25 RX ORDER — QUETIAPINE FUMARATE 100 MG/1
100 TABLET, FILM COATED ORAL AT BEDTIME
Status: DISCONTINUED | OUTPATIENT
Start: 2025-04-25 | End: 2025-04-28 | Stop reason: HOSPADM

## 2025-04-25 RX ORDER — FOLIC ACID 1 MG/1
1 TABLET ORAL DAILY
Qty: 30 TABLET | Refills: 0 | Status: SHIPPED | OUTPATIENT
Start: 2025-04-26

## 2025-04-25 RX ORDER — QUETIAPINE FUMARATE 100 MG/1
100 TABLET, FILM COATED ORAL AT BEDTIME
Qty: 30 TABLET | Refills: 0 | Status: SHIPPED | OUTPATIENT
Start: 2025-04-25

## 2025-04-25 RX ORDER — LANOLIN ALCOHOL/MO/W.PET/CERES
100 CREAM (GRAM) TOPICAL DAILY
Qty: 30 TABLET | Refills: 0 | Status: SHIPPED | OUTPATIENT
Start: 2025-04-26

## 2025-04-25 RX ORDER — MULTIPLE VITAMINS W/ MINERALS TAB 9MG-400MCG
1 TAB ORAL DAILY
Qty: 30 TABLET | Refills: 0 | Status: SHIPPED | OUTPATIENT
Start: 2025-04-26

## 2025-04-25 RX ORDER — QUETIAPINE FUMARATE 50 MG/1
50-100 TABLET, FILM COATED ORAL 2 TIMES DAILY PRN
Qty: 90 TABLET | Refills: 0 | Status: SHIPPED | OUTPATIENT
Start: 2025-04-25

## 2025-04-25 RX ORDER — GABAPENTIN 300 MG/1
300 CAPSULE ORAL 3 TIMES DAILY
Qty: 90 CAPSULE | Refills: 0 | Status: SHIPPED | OUTPATIENT
Start: 2025-04-25

## 2025-04-25 RX ADMIN — MAGNESIUM OXIDE TAB 400 MG (241.3 MG ELEMENTAL MG) 400 MG: 400 (241.3 MG) TAB at 20:42

## 2025-04-25 RX ADMIN — DIAZEPAM 10 MG: 5 TABLET ORAL at 08:22

## 2025-04-25 RX ADMIN — Medication 1 TABLET: at 08:21

## 2025-04-25 RX ADMIN — GABAPENTIN 300 MG: 300 CAPSULE ORAL at 14:06

## 2025-04-25 RX ADMIN — GABAPENTIN 300 MG: 300 CAPSULE ORAL at 20:42

## 2025-04-25 RX ADMIN — GABAPENTIN 300 MG: 300 CAPSULE ORAL at 08:21

## 2025-04-25 RX ADMIN — NICOTINE POLACRILEX 8 MG: 4 LOZENGE ORAL at 10:54

## 2025-04-25 RX ADMIN — FOLIC ACID 1 MG: 1 TABLET ORAL at 08:21

## 2025-04-25 RX ADMIN — NICOTINE POLACRILEX 4 MG: 4 LOZENGE ORAL at 08:21

## 2025-04-25 RX ADMIN — QUETIAPINE FUMARATE 100 MG: 50 TABLET ORAL at 15:05

## 2025-04-25 RX ADMIN — DIAZEPAM 10 MG: 5 TABLET ORAL at 11:24

## 2025-04-25 RX ADMIN — DIAZEPAM 10 MG: 5 TABLET ORAL at 17:17

## 2025-04-25 RX ADMIN — THIAMINE HCL TAB 100 MG 100 MG: 100 TAB at 08:21

## 2025-04-25 RX ADMIN — NICOTINE POLACRILEX 8 MG: 4 LOZENGE ORAL at 15:05

## 2025-04-25 RX ADMIN — MAGNESIUM OXIDE TAB 400 MG (241.3 MG ELEMENTAL MG) 400 MG: 400 (241.3 MG) TAB at 08:21

## 2025-04-25 RX ADMIN — QUETIAPINE FUMARATE 100 MG: 100 TABLET ORAL at 20:46

## 2025-04-25 ASSESSMENT — ACTIVITIES OF DAILY LIVING (ADL)
ADLS_ACUITY_SCORE: 23

## 2025-04-25 NOTE — PLAN OF CARE
Problem: Alcohol Withdrawal  Goal: Alcohol Withdrawal Symptom Control  Outcome: Progressing   Goal Outcome Evaluation:    Plan of Care Reviewed With: patient      Patient alert and oriented x 3 - unaware of the date. He appeared unkempt and restless, with tremors and runny nose noted. He denied pain. He reported 9/10 anxiety and depression. He's eating and drinking well. MSSA 8,9. Total valium given 20 mg.     Patient stayed in his room most of the time, napping.

## 2025-04-25 NOTE — PLAN OF CARE
Problem: Alcohol Withdrawal  Goal: Alcohol Withdrawal Symptom Control  Outcome: Progressing   Goal Outcome Evaluation:  MSSA scores of 6/6 patient did not require medication for alcohol withdrawal and is observed to sleep throughout the noc.

## 2025-04-25 NOTE — PROGRESS NOTES
[address]     Daily Encounter: Met with team, discussed patient progress, discharge plan and any impediments to discharge.    Writer met with the patient to discuss discharge and aftercare planning. The patient stated that his peer  will be picking him up on Monday around 9:00 a.m. to take him to [facility]. Writer attempted to confirm the pickup time with the peer  but was only able to leave a voicemail requesting they return my call. Writer communicated with the care coordinator, who confirmed that the patient is set to attend [facility] and has a follow-up appointment scheduled for Wednesday, May 1, 2025, at 2:00 p.m. at the [facility]. The writer shared upcoming appointment information with the patient.     Insurance: [facility]     Legal Status:  Voluntary    SUDs Assessment Status:  None at this time.    ROIs on file:  Karlo Gutierrez  Care Coordinator through [facility]  [phone]  Jinny Al  Peer Support  [phone]    Living Situation: Homeless - awaiting placement at [facility].    Current Providers, Supports & Collateral: Care Coordinator & Peer .    Current Plan/Referral Status: Detox only with plans to move to [facility].    Safety Plan Status: Completed with patient and safety plan was printed out and copy given to the patient.      NAYELI Barroso  [address] - Adult Inpatient Addiction Psychiatry Unit

## 2025-04-25 NOTE — PLAN OF CARE
Problem: Alcohol Withdrawal  Goal: Alcohol Withdrawal Symptom Control  Outcome: Progressing   Goal Outcome Evaluation:    The patient spent most of the evening napping, occasionally coming out of his room. He appeared irritable and mostly kept to himself while in the common area. His MSSA scores were 14 and 11. He was administered Valium 10 mg twice. He also took Maalox and Zofran to manage withdrawal symptoms. He ate dinner and some snacks and reported no concerns.

## 2025-04-25 NOTE — PROGRESS NOTES
"[facility],  Psychiatric Progress Note      Impression:     Kai Sims is a 49-year-old male admitted to [address] on 4/23/2025.  He was admitted as a voluntary patient through the ED due to alcohol use disorder and withdrawal.  During the search in the ED, a large bottle of mouthwash and a needle were found.  He was consuming nearly a liter of liquor daily.  He reports using IV meth \"not much at all.\"  He reports a binge pattern of use, typically 3 days on and one month off.  Last use was 3 days prior to admission.  He smokes cannabis daily.  BAL was 0.41.  UTOX was positive for amphetamines, benzodiazepines and cannabinoids.  He is homeless and has been trying to move into [facility], waiting for an income statement from the [facility].  He said he does not want to be sober.  \"I was drunk and didn't want to stay outside.  I want to get into the [facility].\"  He reports sporadic adherence to medications and struggled to identify prior to admission medications.  He reports he is no longer using the Butrans patch.   Since admission, the MSSA with Valium was initiated.  He continues to experience withdrawal symptoms.  Neurontin, Seroquel and PRNs of Seroquel and Hydroxyzine were initiated.           Diagnoses:     Alcohol use disorder, severe, dependence  Alcohol withdrawal  Stimulant (methamphetamine) use disorder  Cannabis use  History of opiate use disorder  Nicotine use disorder  History of bipolar disorder type 1 versus schizoaffective disorder, bipolar type  History of antisocial personality disorder  Elevated AST  Mild hypomagnesemia  Mild hypocalcemia  Chronic hepatitis C status post treatment  History of TBI         Plan:     Medications:  Continue Neurontin 300 mg TID.  Begin Seroquel 100 mg at HS.  Continue Seroquel  mg QID PRN.  Continue PRN Hydroxyzine.  Discharge meds ordered 4/25.      Continue MSSA with " "Valium.     He is homeless.  He is awaiting placement at [facility].  Per outpatient records, he was referred to a [facility] ACT team earlier this month, and he believes he now has ACT services.  He said his  is planning to pick him up and transport him to [facility] 4/28 at 0900.          Attestation:  Patient has been seen and evaluated by me, MARTHA Gan CNP  The patient was counseled on nature of illness and treatment plan/options  Care was coordinated with treatment team  Total time > 35 minutes          Interim History:     The patient's care was discussed with the treatment team and chart notes were reviewed.  Yesterday pt received a total of 50 mg of Valium for alcohol withdrawal.  He took PRNs of Maalox and Zofran.  He ate well.  He was calm during interactions with staff.  He exhibited psychomotor agitation.  He indicated that he was considering CD treatment.  Today, pt states he feels lethargic and noted, \"My thoughts are screwed up.  All I think about is booze.\"  He said that his mood \"shifts like the sea\" and he often feels anxious and irritable.  He had difficulty sleeping last night.  Appetite is \"alright.\"  He denies suicidal and homicidal ideation.  He denies psychotic symptoms.  Pt states he wants to quit using meth, but he does not want to stop consuming alcohol  States his  called yesterday and informed him that he will be picked up and transported to [facility] Monday 4/28 at 0900.  Pt's speech was rather pressured and loud.  Provider suggested scheduling Seroquel at HS.  He agreed to take 100 mg at HS.  He noted the Neurontin has been helpful for BUE neuropathic pain.  Ordered discharge medications.            Medications:     Current Facility-Administered Medications   Medication Dose Route Frequency Provider Last Rate Last Admin    acetaminophen (TYLENOL) tablet 650 mg  650 mg Oral Q4H PRN Mell Ann APRN CNP "        alum & mag hydroxide-simethicone (MAALOX) suspension 30 mL  30 mL Oral Q4H PRN Mell Ann APRN CNP   30 mL at 04/24/25 1707    atenolol (TENORMIN) tablet 50 mg  50 mg Oral Daily PRN Mell Ann APRN CNP        diazepam (VALIUM) tablet 5-20 mg  5-20 mg Oral Q30 Min PRN Nicholas Figueroa MD   10 mg at 04/25/25 0822    folic acid (FOLVITE) tablet 1 mg  1 mg Oral Daily LeasburgMell manuel APRN CNP   1 mg at 04/25/25 0821    gabapentin (NEURONTIN) capsule 300 mg  300 mg Oral TID Sarah Sarabia APRN CNP   300 mg at 04/25/25 0821    hydrOXYzine HCl (ATARAX) tablet 25 mg  25 mg Oral Q4H PRN Mell Ann APRN CNP        loperamide (IMODIUM) capsule 2 mg  2 mg Oral 4x Daily PRN Mell Ann APRN CNP        magnesium oxide (MAG-OX) tablet 400 mg  400 mg Oral BID Brant Osorio PA-C   400 mg at 04/25/25 0821    multivitamin w/minerals (THERA-VIT-M) tablet 1 tablet  1 tablet Oral Daily Mell Ann APRN CNP   1 tablet at 04/25/25 0821    nicotine (NICORETTE) lozenge 4-8 mg  4-8 mg Buccal Q1H PRN Sarah Sarabia APRN CNP   4 mg at 04/25/25 0821    ondansetron (ZOFRAN ODT) ODT tab 4 mg  4 mg Oral Q6H PRN Mell Ann APRN CNP   4 mg at 04/24/25 1707    QUEtiapine (SEROquel) tablet 100 mg  100 mg Oral At Bedtime Sarah Sarabia APRN CNP        QUEtiapine (SEROquel) tablet  mg   mg Oral 4x Daily PRN Sarah Sarabia APRN CNP        senna-docusate (SENOKOT-S/PERICOLACE) 8.6-50 MG per tablet 1 tablet  1 tablet Oral BID PRN Mell Ann APRN CNP        thiamine (B-1) tablet 100 mg  100 mg Oral Daily Mell Ann APRN CNP   100 mg at 04/25/25 0821     Facility-Administered Medications Ordered in Other Encounters   Medication Dose Route Frequency Provider Last Rate Last Admin    Self Administer Medications: Behavioral Services   Does not apply See Admin Instructions Rafal Kolb MD              Allergies:     No Known  "Allergies         Psychiatric Examination:     /78 (BP Location: Right arm, Patient Position: Supine, Cuff Size: Adult Regular)   Pulse 89   Temp 97.4  F (36.3  C) (Temporal)   Resp 16   Ht 1.702 m (5' 7\")   Wt 63.5 kg (140 lb)   SpO2 96%   BMI 21.93 kg/m    Weight is 140 lbs 0 oz  Body mass index is 21.93 kg/m .    Appearance:  awake, alert, disheveled  Attitude: attempts to cooperate  Eye Contact:  fair  Mood:  irritable, anxious  Affect:  intensity is mildly increased  Speech:  loud, somewhat pressured, rather garbled at times  Psychomotor Behavior:  no evidence of tardive dyskinesia, dystonia, or tics, psychomotor agitation is present  Thought Process:  rather concrete, less than logical  Associations:  no loose associations  Thought Content:  no evidence of suicidal ideation or homicidal ideation, denies psychotic symptoms  Insight:  limited  Judgment:  fair  Oriented to:  month/year, time, person, and place  Attention Span and Concentration:  limited  Recent and Remote Memory:  limited  Language:  intact, fluent English  Fund of Knowledge:  low-normal  Muscle Strength and Tone:  normal  Gait and Station:  fidgety         Labs:     Recent Results (from the past 24 hours)   Magnesium    Collection Time: 04/24/25  2:26 PM   Result Value Ref Range    Magnesium 1.3 (L) 1.7 - 2.3 mg/dL   Extra Purple Top Tube    Collection Time: 04/24/25  2:26 PM   Result Value Ref Range    Hold Specimen JIC      "

## 2025-04-26 LAB
HGB BLD-MCNC: 13.1 G/DL (ref 13.3–17.7)
HOLD SPECIMEN: NORMAL
HOLD SPECIMEN: NORMAL

## 2025-04-26 PROCEDURE — 250N000011 HC RX IP 250 OP 636: Performed by: NURSE PRACTITIONER

## 2025-04-26 PROCEDURE — 85018 HEMOGLOBIN: CPT

## 2025-04-26 PROCEDURE — 250N000013 HC RX MED GY IP 250 OP 250 PS 637: Performed by: FAMILY MEDICINE

## 2025-04-26 PROCEDURE — 250N000013 HC RX MED GY IP 250 OP 250 PS 637

## 2025-04-26 PROCEDURE — 250N000013 HC RX MED GY IP 250 OP 250 PS 637: Performed by: NURSE PRACTITIONER

## 2025-04-26 PROCEDURE — 36415 COLL VENOUS BLD VENIPUNCTURE: CPT

## 2025-04-26 PROCEDURE — 128N000004 HC R&B CD ADULT

## 2025-04-26 RX ORDER — PANTOPRAZOLE SODIUM 40 MG/1
40 TABLET, DELAYED RELEASE ORAL ONCE
Status: COMPLETED | OUTPATIENT
Start: 2025-04-26 | End: 2025-04-26

## 2025-04-26 RX ORDER — PANTOPRAZOLE SODIUM 40 MG/1
40 TABLET, DELAYED RELEASE ORAL
Status: COMPLETED | OUTPATIENT
Start: 2025-04-27 | End: 2025-04-28

## 2025-04-26 RX ADMIN — FOLIC ACID 1 MG: 1 TABLET ORAL at 08:41

## 2025-04-26 RX ADMIN — GABAPENTIN 300 MG: 300 CAPSULE ORAL at 20:23

## 2025-04-26 RX ADMIN — DIAZEPAM 10 MG: 5 TABLET ORAL at 08:40

## 2025-04-26 RX ADMIN — THIAMINE HCL TAB 100 MG 100 MG: 100 TAB at 08:40

## 2025-04-26 RX ADMIN — NICOTINE POLACRILEX 8 MG: 4 LOZENGE ORAL at 12:28

## 2025-04-26 RX ADMIN — NICOTINE POLACRILEX 8 MG: 4 LOZENGE ORAL at 10:04

## 2025-04-26 RX ADMIN — Medication 1 TABLET: at 08:40

## 2025-04-26 RX ADMIN — MAGNESIUM OXIDE TAB 400 MG (241.3 MG ELEMENTAL MG) 400 MG: 400 (241.3 MG) TAB at 20:23

## 2025-04-26 RX ADMIN — GABAPENTIN 300 MG: 300 CAPSULE ORAL at 08:40

## 2025-04-26 RX ADMIN — ONDANSETRON 4 MG: 4 TABLET, ORALLY DISINTEGRATING ORAL at 11:36

## 2025-04-26 RX ADMIN — NICOTINE POLACRILEX 8 MG: 4 LOZENGE ORAL at 19:52

## 2025-04-26 RX ADMIN — DIAZEPAM 10 MG: 5 TABLET ORAL at 12:28

## 2025-04-26 RX ADMIN — QUETIAPINE FUMARATE 100 MG: 100 TABLET ORAL at 20:23

## 2025-04-26 RX ADMIN — GABAPENTIN 300 MG: 300 CAPSULE ORAL at 14:35

## 2025-04-26 RX ADMIN — DIAZEPAM 10 MG: 5 TABLET ORAL at 16:30

## 2025-04-26 RX ADMIN — MAGNESIUM OXIDE TAB 400 MG (241.3 MG ELEMENTAL MG) 400 MG: 400 (241.3 MG) TAB at 08:41

## 2025-04-26 RX ADMIN — NICOTINE POLACRILEX 8 MG: 4 LOZENGE ORAL at 08:41

## 2025-04-26 RX ADMIN — PANTOPRAZOLE SODIUM 40 MG: 40 TABLET, DELAYED RELEASE ORAL at 12:28

## 2025-04-26 ASSESSMENT — ACTIVITIES OF DAILY LIVING (ADL)
ADLS_ACUITY_SCORE: 23
ADLS_ACUITY_SCORE: 23
ADLS_ACUITY_SCORE: 28
ADLS_ACUITY_SCORE: 23
ADLS_ACUITY_SCORE: 28
ADLS_ACUITY_SCORE: 23
ADLS_ACUITY_SCORE: 23
ADLS_ACUITY_SCORE: 28
ADLS_ACUITY_SCORE: 23
ADLS_ACUITY_SCORE: 28
ADLS_ACUITY_SCORE: 23
LAUNDRY: WITH SUPERVISION
ADLS_ACUITY_SCORE: 28
ORAL_HYGIENE: INDEPENDENT
ADLS_ACUITY_SCORE: 28
ADLS_ACUITY_SCORE: 28
DRESS: INDEPENDENT
ADLS_ACUITY_SCORE: 28
ADLS_ACUITY_SCORE: 23
ADLS_ACUITY_SCORE: 28
HYGIENE/GROOMING: INDEPENDENT

## 2025-04-26 NOTE — PLAN OF CARE
"  Problem: Alcohol Withdrawal  Goal: Alcohol Withdrawal Symptom Control  Outcome: Progressing     Problem: Adult Inpatient Plan of Care  Goal: Plan of Care Review  Description: The Plan of Care Review/Shift note should be completed every shift.  The Outcome Evaluation is a brief statement about your assessment that the patient is improving, declining, or no change.  This information will be displayed automatically on your shiftnote.  Outcome: Progressing   Goal Outcome Evaluation:    Plan of Care Reviewed With: patient      Patient continue on detox from alcohol. He was isolated/ withdraw in his room most of this shift, sleeping.Patient endorsed anxiety 9/10 and depression 9/10. 10 being the worst. Patient denied SI/HI/SIB. Contracted for safety. Patient reported appetite is poor, had like 40% of his dinner with adequate fluid intake. Patient scored MSSA of 9 and 4 during this shift. Prn 10 mg valium x1 given for alcohol withdrawal. Patient compliant with medications and vital sings WNL. /72 (BP Location: Left arm, Patient Position: Sitting, Cuff Size: Adult Regular)   Pulse 81   Temp 97  F (36.1  C) (Oral)   Resp 16   Ht 1.702 m (5' 7\")   Wt 63.5 kg (140 lb)   SpO2 96%   BMI 21.93 kg/m     "

## 2025-04-26 NOTE — PROVIDER NOTIFICATION
"Patient approached writer at 1130 and stated he had a large emesis in his toilet with a small amount of blood in it. This was unwitnessed by staff and patient did not save emesis in toilet. Patient stated, \"This has happened to me before while vomiting.\"     Internal medicine, Brant Osorio PA-C, notified.     Patient given Zofran 4mg ODT x 1 for nausea @ 1136.     Patient stated, \"this is what I get for drinking mouthwash before coming in here.\"   "

## 2025-04-26 NOTE — PLAN OF CARE
"Goal Outcome Evaluation:    Plan of Care Reviewed With: patient      Patient is up and visible in the milieu, watching TV with peers. Patient is ambulating independently, balanced and steady. Patient is alert and oriented x 4. Patient is attending/participating in unit programming. Pt is selectively social with peers. Affect is blunted/flat, mood is anxious/depressed. Patient rates anxiety an 8/10 and depression a 10/10 on the 1-10 severity scale. Patient denies SI/SIB/HI. Pt denies auditory/visual hallucinations. Patient verbally contracts for safety on the unit. Patient denies skin issues. Patient denies any issues voiding. Patient encouraged to take a shower at some point today. Patient is tolerating medications well, denies any current side effects.     Pt's MSSA = 8 upon first morning withdrawal assessment, patient medicated with 10mg Valium x 1 @ 0840 (see MAR). Patient was visibly tremulous and diaphoretic this am. Patient reports an improved appetite (eating 100% of meals today), and poor sleep at night which he states is a chronic issue for him. Patient discharge plans pending. Rest, fluids, and food encouraged. Status 15 checks remain. Patient is able to make needs known appropriately. Patient denies any unmet needs at this time.     Blood pressure 129/89, pulse 94, temperature 97.1  F (36.2  C), temperature source Temporal, resp. rate 16, height 1.702 m (5' 7\"), weight 63.5 kg (140 lb), SpO2 98%.     Update:  Patient MSSA at lunchtime = 9. Patient medicated with 10mg Valium x 1 (See MAR) @ 1228. Protonix 40mg ordered and given x 1 per IM.     Patient frequently up at the desk asking for nicotine gum.       "

## 2025-04-26 NOTE — PLAN OF CARE
Problem: Alcohol Withdrawal  Goal: Alcohol Withdrawal Symptom Control  Outcome: Progressing   Goal Outcome Evaluation:    MSSA score of 4 patient did not require medication for alcohol withdrawal withdrawal and is observed to sleep throughout the noc.

## 2025-04-26 NOTE — PROGRESS NOTES
Brief Internal Medicine Note:     Was notified by RN that patient had episode of emesis in toilet with small amount of blood in it. He is hemodynamically stable and has had this before. Was given zofran by nursing staff. Seen eating full meal in milieu after. No recurrence.     - Hgb stable, no need for recheck   - monitor for recurrence; patient instructed to keep emesis in toilet if recurrence   - will give protonix x 3 days     Internal medicine will sign off at this time.     Brant Osorio PA-C   [facility]  Securely message with the Vocera Web Console (learn more here)  Text page via [facility] Paging/Directory

## 2025-04-27 PROCEDURE — 128N000004 HC R&B CD ADULT

## 2025-04-27 PROCEDURE — 250N000013 HC RX MED GY IP 250 OP 250 PS 637: Performed by: NURSE PRACTITIONER

## 2025-04-27 PROCEDURE — 250N000013 HC RX MED GY IP 250 OP 250 PS 637

## 2025-04-27 PROCEDURE — 250N000013 HC RX MED GY IP 250 OP 250 PS 637: Performed by: FAMILY MEDICINE

## 2025-04-27 RX ADMIN — NICOTINE POLACRILEX 8 MG: 4 LOZENGE ORAL at 14:00

## 2025-04-27 RX ADMIN — NICOTINE POLACRILEX 4 MG: 4 LOZENGE ORAL at 17:56

## 2025-04-27 RX ADMIN — THIAMINE HCL TAB 100 MG 100 MG: 100 TAB at 08:31

## 2025-04-27 RX ADMIN — NICOTINE POLACRILEX 8 MG: 4 LOZENGE ORAL at 19:34

## 2025-04-27 RX ADMIN — GABAPENTIN 300 MG: 300 CAPSULE ORAL at 13:59

## 2025-04-27 RX ADMIN — NICOTINE POLACRILEX 8 MG: 4 LOZENGE ORAL at 07:34

## 2025-04-27 RX ADMIN — GABAPENTIN 300 MG: 300 CAPSULE ORAL at 20:11

## 2025-04-27 RX ADMIN — NICOTINE POLACRILEX 8 MG: 4 LOZENGE ORAL at 16:26

## 2025-04-27 RX ADMIN — GABAPENTIN 300 MG: 300 CAPSULE ORAL at 08:31

## 2025-04-27 RX ADMIN — DIAZEPAM 5 MG: 5 TABLET ORAL at 08:31

## 2025-04-27 RX ADMIN — Medication 1 TABLET: at 08:31

## 2025-04-27 RX ADMIN — MAGNESIUM OXIDE TAB 400 MG (241.3 MG ELEMENTAL MG) 400 MG: 400 (241.3 MG) TAB at 08:31

## 2025-04-27 RX ADMIN — NICOTINE POLACRILEX 8 MG: 4 LOZENGE ORAL at 09:38

## 2025-04-27 RX ADMIN — QUETIAPINE FUMARATE 100 MG: 100 TABLET ORAL at 21:14

## 2025-04-27 RX ADMIN — NICOTINE POLACRILEX 8 MG: 4 LOZENGE ORAL at 20:11

## 2025-04-27 RX ADMIN — FOLIC ACID 1 MG: 1 TABLET ORAL at 08:31

## 2025-04-27 RX ADMIN — NICOTINE POLACRILEX 4 MG: 4 LOZENGE ORAL at 17:55

## 2025-04-27 RX ADMIN — PANTOPRAZOLE SODIUM 40 MG: 40 TABLET, DELAYED RELEASE ORAL at 07:23

## 2025-04-27 ASSESSMENT — ACTIVITIES OF DAILY LIVING (ADL)
ADLS_ACUITY_SCORE: 28
ORAL_HYGIENE: INDEPENDENT
ADLS_ACUITY_SCORE: 28
HYGIENE/GROOMING: INDEPENDENT
ADLS_ACUITY_SCORE: 28
ADLS_ACUITY_SCORE: 28
DRESS: INDEPENDENT
ADLS_ACUITY_SCORE: 28

## 2025-04-27 NOTE — PLAN OF CARE
Problem: Alcohol Withdrawal  Goal: Alcohol Withdrawal Symptom Control  Outcome: Progressing   Goal Outcome Evaluation:    MSSA score of 5 patient did not require medication for alcohol withdrawal withdrawal and is observed to sleep throughout the noc.

## 2025-04-27 NOTE — PLAN OF CARE
Problem: Alcohol Withdrawal  Goal: Alcohol Withdrawal Symptom Control  Outcome: Progressing     Problem: Adult Inpatient Plan of Care  Goal: Plan of Care Review  Description: The Plan of Care Review/Shift note should be completed every shift.  The Outcome Evaluation is a brief statement about your assessment that the patient is improving, declining, or no change.  This information will be displayed automatically on your shiftnote.  Outcome: Progressing   Goal Outcome Evaluation:    Patient isolated in his room most this shift but came out for meals and medications. After dinner patient to his room sleeping later woke up agitated and angry complaining of having small piece of chicken at dinner and he is hungry. Staff was able to give him sandwich and patient was happy. Patient had 100% of his dinner and compliant with medications. He endorsed anxiety and depression. Denied SI/HI/SIB. Patient scored MSSA of 8 and 5. Prn 10mg valium given x1 for alcohol withdrawal. Staff will continue to monitor.

## 2025-04-27 NOTE — PLAN OF CARE
RN Assessment:    Pt presented with euthymic affect. Pt appeared calm, and pt was cooperative while interacting with writer. Pt was alert and oriented x 4. Pt denied having SI, HI, thoughts of SIB, and hallucinations. Pt denied having physical pain. Pt had no new medical concerns. Pt endorsed not sleeping well last night due to waking multiple times. Pt feels the medications that are currently ordered are working well. No medication side effects endorsed by pt or observed by writer. Pt was present in the milieu. Continue to monitor for safety and changes in medical condition.       MSSA Scoring this shift:    0825: 11    1131: 7      PRN Medications passed this shift:    0831: Valium 5 mg PO for MSSA score of 11.    0938: Nicotine lozenge 8 mg for nicotine withdrawal symptoms.     1400: Nicotine lozenge 8 mg for nicotine withdrawal symptoms.

## 2025-04-28 VITALS
HEART RATE: 108 BPM | TEMPERATURE: 97.3 F | WEIGHT: 140 LBS | RESPIRATION RATE: 16 BRPM | BODY MASS INDEX: 21.97 KG/M2 | SYSTOLIC BLOOD PRESSURE: 155 MMHG | OXYGEN SATURATION: 97 % | HEIGHT: 67 IN | DIASTOLIC BLOOD PRESSURE: 107 MMHG

## 2025-04-28 PROCEDURE — 250N000013 HC RX MED GY IP 250 OP 250 PS 637

## 2025-04-28 PROCEDURE — 250N000013 HC RX MED GY IP 250 OP 250 PS 637: Performed by: NURSE PRACTITIONER

## 2025-04-28 PROCEDURE — 99239 HOSP IP/OBS DSCHRG MGMT >30: CPT | Performed by: NURSE PRACTITIONER

## 2025-04-28 RX ADMIN — GABAPENTIN 300 MG: 300 CAPSULE ORAL at 08:28

## 2025-04-28 RX ADMIN — Medication 1 TABLET: at 08:28

## 2025-04-28 RX ADMIN — ATENOLOL 50 MG: 50 TABLET ORAL at 08:28

## 2025-04-28 RX ADMIN — FOLIC ACID 1 MG: 1 TABLET ORAL at 08:28

## 2025-04-28 RX ADMIN — PANTOPRAZOLE SODIUM 40 MG: 40 TABLET, DELAYED RELEASE ORAL at 07:18

## 2025-04-28 RX ADMIN — NICOTINE POLACRILEX 8 MG: 4 LOZENGE ORAL at 07:29

## 2025-04-28 RX ADMIN — THIAMINE HCL TAB 100 MG 100 MG: 100 TAB at 08:28

## 2025-04-28 ASSESSMENT — ACTIVITIES OF DAILY LIVING (ADL)
ADLS_ACUITY_SCORE: 28

## 2025-04-28 NOTE — PLAN OF CARE
Problem: Alcohol Withdrawal  Goal: Alcohol Withdrawal Symptom Control  Outcome: Progressing   Goal Outcome Evaluation:    MSSA score of 5, patient did not require medication for alcohol withdrawal and is observed to sleep throughout the noc

## 2025-04-28 NOTE — DISCHARGE SUMMARY
"Psychiatric Discharge Summary    Kai Sims MRN# [id number]   Age: 49 year old YOB: 1975     Date of Admission:  4/23/2025  Date of Discharge:  4/28/2025  Admitting Physician:  Dallas Lopes MD  Discharge Physician:  MARTHA Gan CNP (Contact: [phone])         Event Leading to Hospitalization:      From H&P 4/24/2025:    Kai Sims is a 49-year-old male admitted to [address] on 4/23/2025.  He was admitted as a voluntary patient through the ED due to alcohol use disorder and withdrawal.  During the search in the ED, a large bottle of mouthwash and a needle were found.  He was consuming nearly a liter of liquor daily.  He reports using IV meth \"not much at all.\"  He reports a binge pattern of use, typically 3 days on and one month off.  Last use was 3 days prior to admission.  He smokes cannabis daily.  BAL was 0.41.  UTOX was positive for amphetamines, benzodiazepines and cannabinoids.  He is homeless and has been trying to move into [facility], waiting for an income statement from the [facility].  He said he does not want to be sober.  \"I was drunk and didn't want to stay outside.  I want to get into the [facility].\"  He reports sporadic adherence to medications and struggled to identify prior to admission medications.  He reports he is no longer using the Butrans patch.      His mood is \"up and down.\"  He sometimes has anhedonia.  Energy is variable, \"like a asher asher train, a roller coaster.\"  He sometimes feels as though he has too much energy.  Concentration is \"not that good.\"  Sleep is \"not that good.\"  Appetite is \"not that good.\"  He denies suicidal and homicidal ideation.  He denies hallucinations.  He denies paranoid/delusional thoughts.  He reports feeling anxious and irritable.  He has racing thoughts.  He feels restless.  He reports struggles with impulsivity.  He denies excessive gambling.      See full admission " note by Denise Sarabia NP 4/24/2025 for details.          Diagnoses:     Alcohol use disorder, severe, dependence  Alcohol withdrawal, resolved  Stimulant (methamphetamine) use disorder  Cannabis use  History of opiate use disorder  Nicotine use disorder  History of bipolar disorder type 1 versus schizoaffective disorder, bipolar type  History of antisocial personality disorder  Elevated AST  Mild hypomagnesemia  Mild hypocalcemia  Chronic hepatitis C status post treatment  History of TBI          Labs:      Latest Reference Range & Units 04/23/25 15:57 04/23/25 16:27 04/23/25 16:28 04/23/25 17:41   Sodium 135 - 145 mmol/L 139      Potassium 3.4 - 5.3 mmol/L 3.4      Chloride 98 - 107 mmol/L 101      Carbon Dioxide (CO2) 22 - 29 mmol/L 24      Urea Nitrogen 6.0 - 20.0 mg/dL 16.6      Creatinine 0.67 - 1.17 mg/dL 0.63 (L)      GFR Estimate >60 mL/min/1.73m2 >90      Calcium 8.8 - 10.4 mg/dL 8.7 (L)      Anion Gap 7 - 15 mmol/L 14      Magnesium 1.7 - 2.3 mg/dL 1.5 (L)      Albumin 3.5 - 5.2 g/dL 4.3      Protein Total 6.4 - 8.3 g/dL 7.3      Alkaline Phosphatase 40 - 150 U/L 85      ALT 0 - 70 U/L 49      AST 0 - 45 U/L 66 (H)      Bilirubin Total <=1.2 mg/dL 0.2      GGT 8 - 61 U/L  47     Glucose 70 - 99 mg/dL 93      Lipase 13 - 60 U/L 37      T4 Free 0.90 - 1.70 ng/dL  1.28     TSH 0.30 - 4.20 uIU/mL  0.26 (L)     WBC 4.0 - 11.0 10e3/uL   5.9    Hemoglobin 13.3 - 17.7 g/dL   13.5    Hematocrit 40.0 - 53.0 %   38.7 (L)    Platelet Count 150 - 450 10e3/uL   255    RBC Count 4.40 - 5.90 10e6/uL   4.41    MCV 78 - 100 fL   88    MCH 26.5 - 33.0 pg   30.6    MCHC 31.5 - 36.5 g/dL   34.9    RDW 10.0 - 15.0 %   13.3    % Neutrophils %   47    % Lymphocytes %   45    % Monocytes %   4    % Eosinophils %   2    % Basophils %   1    % Immature Granulocytes %   0    NRBC/W <1 /100   0    Absolute Neutrophil 1.6 - 8.3 10e3/uL   2.8    Absolute Lymphocytes 0.8 - 5.3 10e3/uL   2.7    Absolute Monocytes 0.0 - 1.3 10e3/uL   0.3   "  Absolute Eosinophils 0.0 - 0.7 10e3/uL   0.1    Absolute Basophils 0.0 - 0.2 10e3/uL   0.1    Absolute Immature Granulocytes <=0.4 10e3/uL   0.0    Absolute NRBCs 10e3/uL   0.0    Amphetamine Qual Urine Screen Negative     Screen Positive !   Fentanyl Qual Urine Screen Negative     Screen Negative   Cocaine Urine Screen Negative     Screen Negative   Benzodiazepine Urine Screen Negative     Screen Positive !   Opiates Qualitative Urine Screen Negative     Screen Negative   PCP Urine Screen Negative     Screen Negative   Cannabinoids Qual Urine Screen Negative     Screen Positive !   Barbiturates Qual Urine Screen Negative     Screen Negative   Alcohol ethyl <=0.01 g/dL 0.41 (HH)         Latest Reference Range & Units 04/24/25 14:26 04/25/25 11:18 04/26/25 12:14   Magnesium 1.7 - 2.3 mg/dL 1.3 (L) 1.7    Hemoglobin 13.3 - 17.7 g/dL   13.1 (L)          Consults:     Internal Medicine Consult 4/24/2025:    Assessment & Plan    Kai Sims is a 49 year old male admitted on 4/23/2025. He has a past medical history of hep C, polysubstance use disorder, bipolar I. He presented to the ED in alcohol withdrawal. He was admitted to  for detox. Psychiatry consulted Medicine to assist with medical co-management of patient medical comorbidities.   _______________________      # Acute alcohol withdrawal  # Alcohol use disorder  # Meth use disorder   # Elevated AST   Presented to ED in withdrawal seeking detox. Patient has been drinking nearly a liter of alcohol and some \"a lot of yellow Listerine\" daily he states that he wants to be sober. He does note a history of withdrawal seizures - his last seizure being just over a month ago. Patient denies any significant DT history. He has a history of methamphetamine use and inject still; he did test positive for benzos, cannabinoids, and amphetamines on UDS. MSSA this shift was 7. Blood etoh on arrival was 0.41. AST 66 in setting of alcohol use disorder.   - Continue MSSA   - " Folvite, multi-vites, thiamine supplementation   - Further management per Psychiatry   - Seizures precautions    - Gabapentin TID per primary      # Mild hypomagnesemia   # Mild hypocalcemia   Mag 1.5->1.3 and calcium 8.7. In the setting of alcohol use disorder and poor PO intake.   - Mag ox 400 mg x BID, calcium carb 1 gm x 1   - AM mag      # Bipolar disorder  - Management per psychiatry team, PRN seroquel      # Chronic Hepatitis C s/p treatment   Appears to have successfully completed treatment with neg PCR x 2 with last in July 2024. Ongoing IV drug use.  Follows with IM at [facility] - seen 4/2025.   - Most recent hep C PCR not detected       # History of TBI: Noted   # Tobacco use disorder: NRT per primary    # Housing insecurity: Recommend SW consult     ---------------------------------------------------------------------------------------------------------------      Brief Internal Medicine Note 4/26/2025:      Was notified by RN that patient had episode of emesis in toilet with small amount of blood in it. He is hemodynamically stable and has had this before. Was given zofran by nursing staff. Seen eating full meal in milieu after. No recurrence.      - Hgb stable, no need for recheck   - monitor for recurrence; patient instructed to keep emesis in toilet if recurrence   - will give protonix x 3 days      Internal medicine will sign off at this time.          Hospital Course:     Kai Sims was admitted to [address] with attending Dallas Lopes MD as a voluntary patient. The patient was placed under status 15 (15 minute checks) to ensure patient safety.     MSSA protocol was initiated due to the patient's history of alcohol abuse and concern for withdrawal symptoms.  He received 50 mg of Valium on day 1, 30 mg of Valium on day 2, 30 mg of Valium on day 3 and 5 mg of Valium on day 4.  Thereafter his withdrawal subsided, and the MSSA was discontinued.     Neurontin 300 mg TID was  initiated for anxiety and neuropathic pain.  Seroquel 100 mg at HS and  mg PRN was initiated.      Kai Sims spent most of his time in his room.  He occasionally participated in groups.  He stated his intent to abstain from use of meth, however he stated he planned to continue drinking.  He was in contact with his outpatient treatment team and arranged for discharge to a wet house.      He was intermittently irritable during interactions with staff.  He exhibited some hypomanic symptoms including impulsivity and loud, pressured speech.  There was no evidence of psychosis.  He denied suicidal and homicidal ideation.  He ate and slept well.      Kai Sims was discharged to [facility].  At the time of discharge Kai Sims was determined to not be a danger to himself or others.          Discharge Medications:      Details   folic acid (FOLVITE) 1 MG tablet Take 1 tablet (1 mg) by mouth daily.  Qty: 30 tablet, Refills: 0    Associated Diagnoses: Alcohol use disorder      multivitamin w/minerals (THERA-VIT-M) tablet Take 1 tablet by mouth daily.  Qty: 30 tablet, Refills: 0    Associated Diagnoses: Alcohol use disorder      thiamine (B-1) 100 MG tablet Take 1 tablet (100 mg) by mouth daily.  Qty: 30 tablet, Refills: 0    Associated Diagnoses: Alcohol use disorder        gabapentin (NEURONTIN) 300 MG capsule Take 1 capsule (300 mg) by mouth 3 times daily.  Qty: 90 capsule, Refills: 0    Associated Diagnoses: Alcohol use disorder      QUEtiapine (SEROQUEL) 100 MG tablet Take 1 tablet (100 mg) by mouth at bedtime.  Qty: 30 tablet, Refills: 0    Associated Diagnoses: Unspecified mood (affective) disorder      QUEtiapine (SEROQUEL) 50 MG tablet Take 1-2 tablets ( mg) by mouth 2 times daily as needed (agitation).  Qty: 90 tablet, Refills: 0    Associated Diagnoses: Unspecified mood (affective) disorder             Psychiatric Examination:     Appearance:  awake, alert, disheveled  Attitude:  "attempts to cooperate  Eye Contact:  fair  Mood:  \"okay,\" somewhat irritable and anxious  Affect:  intensity is mildly increased  Speech:  loud, somewhat pressured, rather garbled at times  Psychomotor Behavior:  no evidence of tardive dyskinesia, dystonia, or tics, psychomotor agitation is present  Thought Process:  rather concrete, less than logical  Associations:  no loose associations  Thought Content:  no evidence of suicidal ideation or homicidal ideation, denies psychotic symptoms  Insight:  limited  Judgment:  fair, adequate for safety  Oriented to:  month/year, time, person, and place  Attention Span and Concentration:  limited  Recent and Remote Memory:  limited  Language:  intact, fluent English  Fund of Knowledge:  low-normal  Muscle Strength and Tone:  normal  Gait and Station:  fidgety     /84 (BP Location: Left arm, Patient Position: Supine, Cuff Size: Adult Regular)   Pulse 82   Temp 97.6  F (36.4  C) (Temporal)   Resp 18   Ht 1.702 m (5' 7\")   Wt 63.5 kg (140 lb)   SpO2 98%   BMI 21.93 kg/m           Discharge Plan:     Per Discharge AVS:    Summary: You were admitted to [facility] on 4/24/2025 for detoxification from alcohol.  A medical exam was performed that included lab work. You have met with a [facility] Counselor and opted to detox only with plan to go to [facility].  Please take care and make your recovery a daily priority, Kai!  It was a pleasure working with you and the entire treatment team here wishes you the very best in your recovery!     Recommendation:  It is recommended that you abstain from use of all mood-altering chemicals unless prescribed by a medical professional.      Substance use treatment services were recommended, but you have declined these services at this time. If at any time after discharge you would like to pursue these services at [facility] please contact us at [phone].     Major Treatments, Procedures and Findings:  You were treated " for alcohol detoxification using Valium. As an outpatient you will be prescribed you medication prior to admission, please take this medication as prescribed until it is gone. You have met with a [facility] counselor to develop a treatment plan for discharge. You had labs drawn and those results were reviewed with you. Please take a copy of your lab work with you to your next primary care provider appointment.    Symptoms to Report:  If you experience more anxiety, confusion, sleeplessness, deep sadness or thoughts of suicide, notify your treatment team or notify your primary care provider. IF ANY OF THE SYMPTOMS YOU ARE EXPERIENCING ARE A MEDICAL EMERGENCY CALL 911 IMMEDIATELY.     Lifestyle Adjustment: Adjust your lifestyle to get enough sleep, relaxation, exercise and good nutrition. Continue to develop healthy coping skills to decrease stress and promote a sober living environment. Do not use mood altering substances including alcohol, illegal drugs or addictive medications other than what is currently prescribed.     Disposition: Go to [facility].    Follow-up Appointment: 5/1/2025 at 2:00 pm (In-person)  Provider: [facility]  Address: [address] [place] [address]  Phone Number: [phone]       Attestation:  The patient has been seen and evaluated by me, MARTHA Gan CNP  Discharge time > 30 minutes

## 2025-04-28 NOTE — PLAN OF CARE
"  Problem: Alcohol Withdrawal  Goal: Alcohol Withdrawal Symptom Control  Outcome: Progressing     Problem: Adult Inpatient Plan of Care  Goal: Plan of Care Review  Description: The Plan of Care Review/Shift note should be completed every shift.  The Outcome Evaluation is a brief statement about your assessment that the patient is improving, declining, or no change.  This information will be displayed automatically on your shiftnote.  Outcome: Progressing   Goal Outcome Evaluation:  Patient visible in the milieu. Engaged with peers, watching TV and attended and participated in the group therapy. Denied psych symptoms, SI/HI/SIB. He stated appetite is good. Had 100% of his dinner and adequate fluid intake. Patient scored MSSA of 5 and 5. No Prn valium given during this shift. Patient is looking forward to discharge tomorrow. Staff will continue to monitor. /86 (BP Location: Left arm, Patient Position: Sitting, Cuff Size: Adult Regular)   Pulse 101   Temp 98.7  F (37.1  C) (Oral)   Resp 16   Ht 1.702 m (5' 7\")   Wt 63.5 kg (140 lb)   SpO2 98%   BMI 21.93 kg/m            "

## 2025-04-28 NOTE — PLAN OF CARE
Goal Outcome Evaluation:       Writer met with Kai to review discharge instructions and answer questions. Pt's BP and pulse were elevated this am, his Provider was informed and PRN Tenormin was given. Kai's MSSA was 7, his last valium was at 08:31 yesterday, so Pt is clinically out of detox. Kai insists he is ready to discharge.     Kai's  Jinny phoned writer and will  Pt at approximately 09:25 for transport to [facility] for his intake at 10:00 am there.      Kai denied having suicidal ideation or self harm thoughts. Kai received his belongings and discharge medications, then was escorted to the [facility] Entr[facility] at 09:25 am where he was picked up by Jinny ALAS for transport to [facility].

## 2025-06-21 ENCOUNTER — HOSPITAL ENCOUNTER (EMERGENCY)
Facility: CLINIC | Age: 50
Discharge: HOME | End: 2025-06-22
Attending: EMERGENCY MEDICINE
Payer: COMMERCIAL

## 2025-06-21 DIAGNOSIS — R45.851 SUICIDE IDEATION: ICD-10-CM

## 2025-06-21 PROCEDURE — 250N000013 HC RX MED GY IP 250 OP 250 PS 637: Performed by: EMERGENCY MEDICINE

## 2025-06-21 PROCEDURE — 99284 EMERGENCY DEPT VISIT MOD MDM: CPT | Performed by: EMERGENCY MEDICINE

## 2025-06-21 PROCEDURE — 99285 EMERGENCY DEPT VISIT HI MDM: CPT | Performed by: EMERGENCY MEDICINE

## 2025-06-21 RX ORDER — OLANZAPINE 10 MG/1
10 TABLET, ORALLY DISINTEGRATING ORAL ONCE
Status: COMPLETED | OUTPATIENT
Start: 2025-06-21 | End: 2025-06-21

## 2025-06-21 RX ORDER — NICOTINE 21 MG/24HR
1 PATCH, TRANSDERMAL 24 HOURS TRANSDERMAL ONCE
Status: DISCONTINUED | OUTPATIENT
Start: 2025-06-21 | End: 2025-06-22 | Stop reason: HOSPADM

## 2025-06-21 RX ADMIN — NICOTINE POLACRILEX 4 MG: 4 GUM, CHEWING BUCCAL at 04:16

## 2025-06-21 RX ADMIN — OLANZAPINE 10 MG: 10 TABLET, ORALLY DISINTEGRATING ORAL at 04:30

## 2025-06-21 RX ADMIN — NICOTINE POLACRILEX 4 MG: 4 GUM, CHEWING BUCCAL at 02:16

## 2025-06-21 RX ADMIN — NICOTINE 1 PATCH: 21 PATCH, EXTENDED RELEASE TRANSDERMAL at 04:22

## 2025-06-21 ASSESSMENT — ACTIVITIES OF DAILY LIVING (ADL)
ADLS_ACUITY_SCORE: 54

## 2025-06-21 ASSESSMENT — COLUMBIA-SUICIDE SEVERITY RATING SCALE - C-SSRS
5. HAVE YOU STARTED TO WORK OUT OR WORKED OUT THE DETAILS OF HOW TO KILL YOURSELF? DO YOU INTEND TO CARRY OUT THIS PLAN?: YES
3. HAVE YOU BEEN THINKING ABOUT HOW YOU MIGHT KILL YOURSELF?: YES
1. IN THE PAST MONTH, HAVE YOU WISHED YOU WERE DEAD OR WISHED YOU COULD GO TO SLEEP AND NOT WAKE UP?: YES
2. HAVE YOU ACTUALLY HAD ANY THOUGHTS OF KILLING YOURSELF IN THE PAST MONTH?: YES
6. HAVE YOU EVER DONE ANYTHING, STARTED TO DO ANYTHING, OR PREPARED TO DO ANYTHING TO END YOUR LIFE?: YES

## 2025-06-21 NOTE — ED TRIAGE NOTES
"Pt report feeling suicidal  \"wanting to jump off the bridge' pt reports thought is \"intensified\"        "

## 2025-06-21 NOTE — ED NOTES
Pt screamed and demanded to leave. Pt requested to speak with the provider. RN brought the provider to explain why pt couldn't leave. Pt then requested a room, RN asked charge RN for an available room and assigned patient to the room, and pt refused because the room didn't have a TV. Pt yelled and screamed at the 1:1 staff for staring at him. The provider spoke with pt in his room. Provider ordered 10 mg Zyprexa to help calm pt; RN administered med; provider discontinued the 1:1. Pt is now in his room calm and laying on the bed with a TV on.

## 2025-06-21 NOTE — ED NOTES
Patient's friend had left. Patient appears calm even in the absence of his friend. This writer informed the patient that hourly roundings will be done by the staff. Patient said it's okay if staff will check him regularly what he does not like was someone staring at him all the time.

## 2025-06-21 NOTE — ED NOTES
Patient sleeping for all of day shift. Night shift report was that patient was up all night long and paranoid. Allowed patient to rest this shift.

## 2025-06-21 NOTE — ED PROVIDER NOTES
"    [facility] EMERGENCY DEPARTMENT ([facility])    6/21/25      ED PROVIDER NOTE      History     Chief Complaint   Patient presents with    Suicidal     HPI  Kai Sims is a 49 year old male with a history of alcohol abuse complicated by withdrawal symptoms, polysubstance use disorder (methamphetamine, cannabis, opiates, nicotine), bipolar disorder type 1 versus schizoaffective disorder, antisocial personality disorder, chronic hepatitis C s/p treatment, TBI, who presents to the ED with increasing suicidal ideation with a plan to jump off a bridge.    Past Medical History  Past Medical History:   Diagnosis Date    Bipolar affective disorder (H)     Substance abuse (H)      Past Surgical History:   Procedure Laterality Date    ENT SURGERY      ORTHOPEDIC SURGERY       folic acid (FOLVITE) 1 MG tablet  gabapentin (NEURONTIN) 300 MG capsule  multivitamin w/minerals (THERA-VIT-M) tablet  QUEtiapine (SEROQUEL) 100 MG tablet  QUEtiapine (SEROQUEL) 50 MG tablet  thiamine (B-1) 100 MG tablet      No Known Allergies  Family History  Family History   Problem Relation Age of Onset    Substance Abuse Father     Substance Abuse Sister      Social History   Social History     Tobacco Use    Smoking status: Every Day     Current packs/day: 1.00     Types: Cigarettes   Substance Use Topics    Alcohol use: Yes     Comment: 1L vodka daily    Drug use: Yes     Types: Marijuana, Methamphetamines     Comment: Clean for 15 days      Past medical history, past surgical history, medications, allergies, family history, and social history were reviewed with the patient. No additional pertinent items.   A medically appropriate review of systems was performed with pertinent positives and negatives noted in the HPI, and all other systems negative.    Physical Exam   BP: 129/76  Pulse: 111  Temp: 98.3  F (36.8  C)  Resp: 18  Height: 165.1 cm (5' 5\")  Weight: 60.4 kg (133 lb 1.6 oz)  SpO2: 97 %  Physical Exam  General:  No acute " "distress.  HENT:  Normocephalic and atraumatic.   Eyes: EOMI. Conjunctivae normal.   Cardiovascular: Normal rate and regular rhythm.  Normal heart sounds. No murmur heard.  Pulmonary:  No respiratory distress. Normal breath sounds.   Abdominal: There is no distension.  Abdomen is soft. There is no mass.  There is no abdominal tenderness.   Musculoskeletal: No swelling or tenderness.  Moving all extremities spontaneously.    Skin: Warm and dry  Neurological: No focal deficit present.   Mood and Affect: Mood normal.     ED Course, Procedures, & Data      Procedures       {ED Course Selections (Optional):442872}  {ED Sepsis CMS Documentation (Optional):531523::\" \"}          Medications   nicotine polacrilex (NICORETTE) gum 4 mg (4 mg Buccal $Given 6/21/25 0416)   nicotine (NICODERM CQ) 21 MG/24HR 24 hr patch 1 patch (1 patch Transdermal $Patch/Med Applied 6/21/25 0422)   nicotine polacrilex (NICORETTE) gum 4 mg (4 mg Buccal $Given 6/21/25 0216)   OLANZapine zydis (zyPREXA) ODT tab 10 mg (10 mg Oral $Given 6/21/25 0430)          {Critical Care Performed?:849896}    Assessment & Plan    ***    I have reviewed the nursing notes. I have reviewed the findings, diagnosis, plan and need for follow up with the patient.    New Prescriptions    No medications on file       Final diagnoses:   None       Logan Brennan MD  [facility] EMERGENCY DEPARTMENT  6/21/2025  " of management or test interpretation with another health professional (see separate area of note for details)    The patient's management necessitated moderate risk (prescription drug management including medications given in the ED).    Assessment & Plan    49-year-old male presents to the ED stating he is feeling suicidal on wanting to jump off the bridge.  He has had these thoughts before and has never jumped off a bridge.  DEC assessment ordered.  He was given Zyprexa and a nicotine patch for his symptoms.    I discussed the case with the DEC  and they agree that these thoughts are not new and this is chronic for him.  He has a safety plan in place and has resources available to him.  He will be discharged to home with follow-up with his psychiatrist and psychotherapist.    I have reviewed the nursing notes. I have reviewed the findings, diagnosis, plan and need for follow up with the patient.    Discharge Medication List as of 6/22/2025  2:09 AM          Final diagnoses:   Suicide ideation       Logan Brennan MD  [facility] EMERGENCY DEPARTMENT  6/21/2025     Logan Brennan MD  07/15/25 1103

## 2025-06-22 VITALS
DIASTOLIC BLOOD PRESSURE: 89 MMHG | WEIGHT: 133.1 LBS | HEIGHT: 65 IN | HEART RATE: 94 BPM | TEMPERATURE: 97.9 F | RESPIRATION RATE: 18 BRPM | BODY MASS INDEX: 22.17 KG/M2 | SYSTOLIC BLOOD PRESSURE: 124 MMHG | OXYGEN SATURATION: 97 %

## 2025-06-22 PROBLEM — F19.90 POLYSUBSTANCE USE DISORDER: Status: ACTIVE | Noted: 2017-04-07

## 2025-06-22 PROBLEM — F31.9 BIPOLAR DISORDER (H): Status: ACTIVE | Noted: 2025-06-22

## 2025-06-22 PROBLEM — F10.90 ALCOHOL USE, UNSPECIFIED, UNCOMPLICATED: Chronic | Status: RESOLVED | Noted: 2023-09-11 | Resolved: 2025-06-22

## 2025-06-22 PROBLEM — F10.90 ALCOHOL USE, UNSPECIFIED, UNCOMPLICATED: Chronic | Status: ACTIVE | Noted: 2023-09-11

## 2025-06-22 ASSESSMENT — ACTIVITIES OF DAILY LIVING (ADL)
ADLS_ACUITY_SCORE: 54
ADLS_ACUITY_SCORE: 54

## 2025-06-22 NOTE — DISCHARGE INSTRUCTIONS
Please follow-up with your primary care provider, therapist, and psychiatrist.  Please call to schedule an appointment.  Return to the emergency department for any worsening symptoms.

## 2025-06-22 NOTE — ED PROVIDER NOTES
Emergency Department I-PASS Sign-out      Illness Severity: Stable    Patient Summary:  49 year old male with pertinent PMH of alcohol use who presented with SI with plan to jump off bridge    ED Course/treatment plan: Zyprexa for agitation x 1    Clinical Impression:  SI    Edited by: Aida Johns DO at 2025 [id number]    Action List:  Tests to Follow-up:  DEC    Medications Reconciled/Ordered:  No    ED Mental Health Boarding Order Set Used for Diet/PRNs/Other:  No    DEC, Extended Care, Psych Consult Orders:  DEC assessment ordered and pending.     Situational Awareness & Contingency Plannin Hour Hold Status:  Voluntary, but holdable  Active Orders  N/A    Psychiatric Emergency:  A psychiatric emergency is not active    Disposition:  Awaiting Behavioral Health DEC assessment    Boarding subsequent shift/day updates:  Pending DEC  Voluntary but holdable    Edited by: Aida Johns DO at 2025 [id number]    Synthesis & Events after sign-out:  Behavioral health  evaluated the patient.  Please see their note for complete details.  I discussed patient management with behavioral health assessment.  After period of monitoring in the emergency department patient denies any suicide ideation, no plan, no intent to self-harm.  Patient is able to safety plan and would like to go home.  At this time no emergent indication for hospitalization.  Patient is agreeable to following up with his psychiatrist and therapist.  Return precautions discussed.  Patient understands and agrees with the plan.      Mayi Metz MD   Emergency Medicine     Mayi Metz MD  [phone]

## 2025-06-22 NOTE — CONSULTS
"Diagnostic Evaluation Consultation  Crisis Assessment    Patient Name: Kai Sims  Age:  49 year old  Legal Sex: male  Gender Identity: male  Pronouns:  he/him     Race: White  Ethnicity: Not  or   Language: English      Patient was assessed: In person   Crisis Assessment Start Date: 06/22/25  Crisis Assessment Start Time: 0048  Crisis Assessment Stop Time: 0104  Patient location: [facility] Emergency Department                                 Referral Data and Chief Complaint  Kai Sims presents to the ED via EMS. Patient is presenting to the ED for the following concerns: Suicidal ideation. Factors that make the mental health crisis life threatening or complex are: PT presented to [facility] ED via  EMS due to SI concerns.  PT reports that he was feeling really depressed last night.  PT reported that he is currently living at a wet house where his roommates are stealing stuff from him and he is unable to receive any help from the staff and he is feeling lost and frustrated.  He reports that due to all this he has been feeling overwhelmed and last night he endorsed SI with plans to jump off of bridge/hang self.  However PT reports that he had no intention as he states \"if I was going to do something I would do it I would not just attempt I would fully commit to it.\"  PT denies any NSSI, AH, VH, and HI.  PT does endorse current substance use with alcohol, THC and meth and \"all kinds of drugs however is unable to give much timeline surrounding this PT appears to be minimally responsive and irritable, throughout the assessment.  PT does report that he is feeling really frustrated due to spending almost a whole day in the ED and he does report that he currently does not have any SI and that staying in the ED has reduced the SI when he first came in.  PT wishes to be discharged.  PT at first was resistant to safety planning however was able to engage prior to discharge.      Informed Consent " and Assessment Methods  Explained the crisis assessment process, including applicable information disclosures and limits to confidentiality, assessed understanding of the process, and obtained consent to proceed with the assessment.  Assessment methods included conducting a formal interview with patient, review of medical records, collaboration with medical staff, and obtaining relevant collateral information from family and community providers when available.  : done     History of the Crisis   PT has Hx of past diagnoses of schizoaffective disorder bipolar type vs schizophrenia, major depressive disorder, TBI,  polysubstance use (alcohol, methamphetamine, tobacco, benzodiazepines), anxiety disorder, antisocial personality disorder, intellectual disability, and PTSD .  PT endorses having  Hx of SI with plans however denies any intent or attempts in this.  PT denies NS SI, HI, AH and VH.  PT has Hx of polysubstance use.  PT reports that he is currently seeing a therapist and psychiatrist.  PT is currently living at a [facility].  PT has Hx of multiple programmatic cares such as residentials and PHP's.  PT has Hx of multiple MICD inpatient stays however has no Hx of being until commitment.    Brief Psychosocial History  Family:  Single, Children no  Support System:  None  Employment Status:   (unable to assess)  Source of Income:  unable to assess  Financial Environmental Concerns:  other (see comments) (unable to assess)  Current Hobbies:  other (see comments) (unable to assess)  Barriers in Personal Life:  mental health concerns, emotional concerns    Significant Clinical History  Current Anxiety Symptoms:   (None reported)  Current Depression/Trauma:   (None reported)  Current Somatic Symptoms:   (None reported)  Current Psychosis/Thought Disturbance:   (None reported)  Current Eating Symptoms:   (none reported)  Chemical Use History:  Alcohol: Other (comments) (Unsure of how often as well as last  use)  Benzodiazepines:  (Unsure of last use and frequency)  Opiates: Other (comments) (Unsure of last use and frequency)  Cocaine: Other (comments) (Unsure of last use and frequency)  Marijuana: Other (comments) (Unsure of last use and frequency)  Other Use: Other (comments) (Unsure of last use and frequency)  Withdrawal Symptoms: Other (comments) (none reported)  Addictions: Other (comment) (none reported)   Past diagnosis:  Bipolar Disorder, Substance Use Disorder, ADHD, Personality Disorder  Family history:  No known history of mental health or chemical health concerns  Past treatment:  Individual therapy, Case management, Psychiatric Medication Management, Primary Care, Other Holistic Treatment, Day Treatment, Supportive Living Environment (group home, nursing home house, etc)  Details of most recent treatment:  PT is currently seeing a therapist and psychiatrist as well as living wet house  Other relevant history:       Have there been any medication changes in the past two weeks:  no       Is the patient compliant with medications:  no (PT reports that he has medications however refuses to take them)        Collateral Information  Is there collateral information: Yes     Collateral information name, relationship, phone number:  Darius Mendez counselor [phone]    What happened today: He reports that he has not seen PT in a couple years so he has no new information or about how PT got to the ED today     What is different about patient's functioning: He was unable to provide much of information     What do you think the patient needs:  unsure of    Has patient made comments about wanting to kill themselves/others: no    If d/c is recommended, can they take part in safety/aftercare planning:  no    Additional collateral information:  Attempted: Sukhjinder Thompson [phone]  &  Errol Butterfield  Friend  [phone] no answer for both lvm     Risk Assessment  [place] Suicide Severity Rating Scale Full  "Clinical Version:  Suicidal Ideation  Q1 Wish to be Dead (Lifetime): Yes  Q2 Non-Specific Active Suicidal Thoughts (Lifetime): Yes  3. Active Suicidal Ideation with any Methods (Not Plan) Without Intent to Act (Lifetime): Yes  4. Active Suicidal Ideation with Some Intent to Act, Without Specific Plan (Lifetime): No  5. Active Suicidal Ideation with Specific Plan and Intent (Lifetime): No  Q6 Suicide Behavior (Lifetime): no  Intensity of Ideation (Lifetime)  Most Severe Ideation Rating (Lifetime): 3  Description of Most Severe Ideation (Lifetime): PT reports that he has SI with plans however denies intent due to stating that \"if I was to do something I would just do it and not attempt, but  to go fully through it  Frequency (Lifetime): 2-5 times in week  Duration (Lifetime): 4-8 hours/most of day  Controllability (Lifetime): Can control thoughts with a lot of difficulty  Deterrents (Lifetime): Deterrents definitely stopped you from attempting suicide  Reasons for Ideation (Lifetime): Mostly to end or stop the pain (You couldn't go on living with the pain or how you were feeling)  Suicidal Behavior (Lifetime)  Actual Attempt (Lifetime): No  Has subject engaged in non-suicidal self-injurious behavior? (Lifetime): No  Interrupted Attempts (Lifetime): No  Aborted or Self-Interrupted Attempt (Lifetime): No  Preparatory Acts or Behavior (Lifetime): No    [place] Suicide Severity Rating Scale Recent:   Suicidal Ideation (Recent)  Q1 Wished to be Dead (Past Month): yes  Q2 Suicidal Thoughts (Past Month): yes  Q3 Suicidal Thought Method: yes  Q4 Suicidal Intent without Specific Plan: no  Q5 Suicide Intent with Specific Plan: no  If yes to Q6, within past 3 months?: no  Level of Risk per Screen: moderate risk  Intensity of Ideation (Recent)  Most Severe Ideation Rating (Past 1 Month): 3  Description of Most Severe Ideation (Past 1 Month): PT reports that he has SI with plans however denies intent due to stating that \"if I was " to do something I would just do it and not attempt, but to go fully through it  Frequency (Past 1 Month): 2-5 times in week  Duration (Past 1 Month): 1-4 hours/a lot of time  Controllability (Past 1 Month): Can control thoughts with a lot of difficulty  Deterrents (Past 1 Month): Deterrents definitely stopped you from attempting suicide  Reasons for Ideation (Past 1 Month): Mostly to end or stop the pain (You couldn't go on living with the pain or how you were feeling)  Suicidal Behavior (Recent)  Actual Attempt (Past 3 Months): No  Has subject engaged in non-suicidal self-injurious behavior? (Past 3 Months): No  Interrupted Attempts (Past 3 Months): No  Aborted or Self-Interrupted Attempt (Past 3 Months): No  Preparatory Acts or Behavior (Past 3 Months): No    Environmental or Psychosocial Events: challenging interpersonal relationships, helplessness/hopelessness  Protective Factors: Protective Factors: able to access care without barriers, help seeking, reality testing ability    Does the patient have thoughts of harming others? Feels Like Hurting Others: no  Previous Attempt to Hurt Others: no  Current presentation:  (Calm and engaged)  Is the patient engaging in sexually inappropriate behavior?: no  Does Patient have a known history of aggressive behavior: No    Is the patient engaging in sexually inappropriate behavior?  no        Mental Status Exam   Affect: Appropriate  Appearance: Appropriate  Attention Span/Concentration: Attentive  Eye Contact: Variable    Fund of Knowledge: Appropriate   Language /Speech Content: Fluent  Language /Speech Volume: Normal  Language /Speech Rate/Productions: Minimally Responsive  Recent Memory: Variable  Remote Memory: Variable  Mood: Irritable  Orientation to Person: Yes   Orientation to Place: Yes  Orientation to Time of Day: Yes  Orientation to Date: Yes     Situation (Do they understand why they are here?): Yes  Psychomotor Behavior: Agitated  Thought Content:  Clear  Thought Form: Goal Directed     Mini-Cog Assessment  Number of Words Recalled:    Clock-Drawing Test:     Three Item Recall:    Mini-Cog Total Score:       Medication  Psychotropic medications:   Medication Orders - Psychiatric (From admission, onward)      None             Current Care Team  Patient Care Team:  No Ref-Primary, Physician as PCP - General    Diagnosis  Patient Active Problem List   Diagnosis Code    Polysubstance use disorder F19.90    Unspecified mood (affective) disorder F39    Alcohol withdrawal (H) F10.939    Alcohol withdrawal with complication with inpatient treatment (H) F10.939    Bipolar disorder (H) F31.9       Primary Problem This Admission  Active Hospital Problems    *Bipolar disorder (H)      Polysubstance use disorder        Clinical Summary and Substantiation of Recommendations   Clinical Substantiation:  After therapeutic assessment, intervention and aftercare planning by ED care team and [facility] and in consultation with attending provider, it is the recommendation that pt discharge. At this time the pt is not presenting as an acute risk to self or others.  Pt presented to ED for SI concerns.  Per chart review, PT had arrived to the ED on 6/21/2 5 0144 however it appears that a deck was not placed until 6/22/25 2000.  Per the limited chart information from the day/evening, PT spent that time sleeping.  PT is currently denying SI, NSSI, HI, AH, and VH.  PT reports that he does use substance use however he is unsure about the timelines.  PT was observed to be minimally responsive and irritable due to the length of time in the ED however continues to state that due to the time sent to the ED he no longer is endorsing SI and feels safe to return home .   it is the recommendation of this clinician that pt follow up with their current Outpatient services.  After some encouragement PT was able to engage in safety planning and was prepped for discharge.    Goals for crisis stabilization:   Follow-up with current outpatient services for continued care to manage MICD    Next steps for Care Team:  None at this time    Treatment Objectives Addressed:  rapport building, identifying an appropriate aftercare plan, processing feelings, assessing safety, safety planning    Therapeutic Interventions:  Engaged in safety planning, Engaged in cognitive restructuring/ reframing, looked at common cognitive distortions and challenged negative thoughts., Engaged in guided discovery, explored patient's perspectives and helped expand them through socratic dialogue.    Has a specific means been identified for suicidal/homicide actions: Yes    If yes, describe:  Jumping off of a bridge, hanging self    Explain action steps toward mitigation:  PT continues to state that if he had any intentions of engaging in this he would have done it  already.  PT appears to have protective factors of seeking ED when experiencing SI with plans there was able to engage in the safety planning prior to discharge.    Document completion of mitigation actions:  done    The follow up action still needed prior to discharge:  none    Patient coping skills attempted to reduce the crisis:  Coming to the ED    Disposition  Recommended referrals: Other. please comment (Follow-up with current outpatient services)        Reviewed case and recommendations with attending provider. Attending Name: Tram Metz       Attending concurs with disposition: yes       Patient and/or validated legal guardian concurs with disposition:   yes       Final disposition:  discharge                           Legal status: Voluntary/Patient has signed consent for treatment                                                                                                                                 Reviewed court records: yes       Assessment Details   Total duration spent with the patient: 16 min     CPT code(s) utilized: 79499 - Psychotherapy (with patient) - 30  (16-37*) THOMAS Barajas, Psychotherapist  DEC - Triage & Transition Services  Callback: [phone]